# Patient Record
Sex: FEMALE | Race: WHITE | NOT HISPANIC OR LATINO | Employment: UNEMPLOYED | URBAN - METROPOLITAN AREA
[De-identification: names, ages, dates, MRNs, and addresses within clinical notes are randomized per-mention and may not be internally consistent; named-entity substitution may affect disease eponyms.]

---

## 2017-02-27 ENCOUNTER — APPOINTMENT (OUTPATIENT)
Dept: LAB | Facility: HOSPITAL | Age: 47
End: 2017-02-27
Payer: COMMERCIAL

## 2017-02-27 ENCOUNTER — TRANSCRIBE ORDERS (OUTPATIENT)
Dept: ADMINISTRATIVE | Facility: HOSPITAL | Age: 47
End: 2017-02-27

## 2017-02-27 DIAGNOSIS — Z13.29 SCREENING FOR THYROID DISORDER: ICD-10-CM

## 2017-02-27 DIAGNOSIS — Z13.228 SCREENING FOR PHENYLKETONURIA (PKU): ICD-10-CM

## 2017-02-27 DIAGNOSIS — Z13.1 SCREENING FOR DIABETES MELLITUS: Primary | ICD-10-CM

## 2017-02-27 DIAGNOSIS — Z13.1 SCREENING FOR DIABETES MELLITUS: ICD-10-CM

## 2017-02-27 LAB
25(OH)D3 SERPL-MCNC: 28 NG/ML (ref 30–100)
ALBUMIN SERPL BCP-MCNC: 4.1 G/DL (ref 3.5–5)
ALP SERPL-CCNC: 81 U/L (ref 46–116)
ALT SERPL W P-5'-P-CCNC: 16 U/L (ref 12–78)
ANION GAP SERPL CALCULATED.3IONS-SCNC: 10 MMOL/L (ref 4–13)
AST SERPL W P-5'-P-CCNC: 17 U/L (ref 5–45)
BASOPHILS # BLD AUTO: 0.01 THOUSANDS/ΜL (ref 0–0.1)
BASOPHILS NFR BLD AUTO: 0 % (ref 0–1)
BILIRUB SERPL-MCNC: 0.2 MG/DL (ref 0.2–1)
BUN SERPL-MCNC: 12 MG/DL (ref 5–25)
CALCIUM SERPL-MCNC: 8.9 MG/DL (ref 8.3–10.1)
CHLORIDE SERPL-SCNC: 106 MMOL/L (ref 100–108)
CHOLEST SERPL-MCNC: 149 MG/DL (ref 50–200)
CO2 SERPL-SCNC: 27 MMOL/L (ref 21–32)
CREAT SERPL-MCNC: 0.8 MG/DL (ref 0.6–1.3)
EOSINOPHIL # BLD AUTO: 0 THOUSAND/ΜL (ref 0–0.61)
EOSINOPHIL NFR BLD AUTO: 0 % (ref 0–6)
ERYTHROCYTE [DISTWIDTH] IN BLOOD BY AUTOMATED COUNT: 15.3 % (ref 11.6–15.1)
EST. AVERAGE GLUCOSE BLD GHB EST-MCNC: 105 MG/DL
FOLATE SERPL-MCNC: >20 NG/ML (ref 3.1–17.5)
GFR SERPL CREATININE-BSD FRML MDRD: >60 ML/MIN/1.73SQ M
GLUCOSE SERPL-MCNC: 113 MG/DL (ref 65–140)
HBA1C MFR BLD: 5.3 % (ref 4.2–6.3)
HCT VFR BLD AUTO: 41.8 % (ref 34.8–46.1)
HDLC SERPL-MCNC: 65 MG/DL (ref 40–60)
HGB BLD-MCNC: 13.7 G/DL (ref 11.5–15.4)
LDLC SERPL CALC-MCNC: 76 MG/DL (ref 0–100)
LYMPHOCYTES # BLD AUTO: 1.69 THOUSANDS/ΜL (ref 0.6–4.47)
LYMPHOCYTES NFR BLD AUTO: 22 % (ref 14–44)
MCH RBC QN AUTO: 28.3 PG (ref 26.8–34.3)
MCHC RBC AUTO-ENTMCNC: 32.8 G/DL (ref 31.4–37.4)
MCV RBC AUTO: 86 FL (ref 82–98)
MONOCYTES # BLD AUTO: 0.48 THOUSAND/ΜL (ref 0.17–1.22)
MONOCYTES NFR BLD AUTO: 6 % (ref 4–12)
NEUTROPHILS # BLD AUTO: 5.64 THOUSANDS/ΜL (ref 1.85–7.62)
NEUTS SEG NFR BLD AUTO: 72 % (ref 43–75)
PLATELET # BLD AUTO: 334 THOUSANDS/UL (ref 149–390)
PMV BLD AUTO: 10.3 FL (ref 8.9–12.7)
POTASSIUM SERPL-SCNC: 3.6 MMOL/L (ref 3.5–5.3)
PROT SERPL-MCNC: 7.1 G/DL (ref 6.4–8.2)
RBC # BLD AUTO: 4.84 MILLION/UL (ref 3.81–5.12)
SODIUM SERPL-SCNC: 143 MMOL/L (ref 136–145)
T4 FREE SERPL-MCNC: 0.88 NG/DL (ref 0.76–1.46)
TRIGL SERPL-MCNC: 41 MG/DL
TSH SERPL DL<=0.05 MIU/L-ACNC: 0.92 UIU/ML (ref 0.36–3.74)
VIT B12 SERPL-MCNC: 412 PG/ML (ref 100–900)
WBC # BLD AUTO: 7.82 THOUSAND/UL (ref 4.31–10.16)

## 2017-02-27 PROCEDURE — 80053 COMPREHEN METABOLIC PANEL: CPT

## 2017-02-27 PROCEDURE — 85025 COMPLETE CBC W/AUTO DIFF WBC: CPT

## 2017-02-27 PROCEDURE — 83036 HEMOGLOBIN GLYCOSYLATED A1C: CPT

## 2017-02-27 PROCEDURE — 82746 ASSAY OF FOLIC ACID SERUM: CPT

## 2017-02-27 PROCEDURE — 84443 ASSAY THYROID STIM HORMONE: CPT

## 2017-02-27 PROCEDURE — 36415 COLL VENOUS BLD VENIPUNCTURE: CPT

## 2017-02-27 PROCEDURE — 82306 VITAMIN D 25 HYDROXY: CPT

## 2017-02-27 PROCEDURE — 80061 LIPID PANEL: CPT

## 2017-02-27 PROCEDURE — 82607 VITAMIN B-12: CPT

## 2017-02-27 PROCEDURE — 84439 ASSAY OF FREE THYROXINE: CPT

## 2017-06-23 ENCOUNTER — ALLSCRIPTS OFFICE VISIT (OUTPATIENT)
Dept: OTHER | Facility: OTHER | Age: 47
End: 2017-06-23

## 2018-01-12 VITALS
HEART RATE: 68 BPM | WEIGHT: 189.31 LBS | DIASTOLIC BLOOD PRESSURE: 84 MMHG | HEIGHT: 60 IN | BODY MASS INDEX: 37.17 KG/M2 | SYSTOLIC BLOOD PRESSURE: 128 MMHG | TEMPERATURE: 98.9 F

## 2018-01-26 LAB — HCV AB SER-ACNC: <1

## 2018-12-26 ENCOUNTER — TRANSCRIBE ORDERS (OUTPATIENT)
Dept: ADMINISTRATIVE | Facility: HOSPITAL | Age: 48
End: 2018-12-26

## 2018-12-26 ENCOUNTER — HOSPITAL ENCOUNTER (OUTPATIENT)
Dept: NON INVASIVE DIAGNOSTICS | Facility: HOSPITAL | Age: 48
Discharge: HOME | End: 2018-12-26
Payer: COMMERCIAL

## 2018-12-26 DIAGNOSIS — M79.604 PAIN IN BOTH LOWER EXTREMITIES: ICD-10-CM

## 2018-12-26 DIAGNOSIS — R60.0 LOCALIZED EDEMA: Primary | ICD-10-CM

## 2018-12-26 DIAGNOSIS — M79.605 PAIN IN BOTH LOWER EXTREMITIES: ICD-10-CM

## 2018-12-26 DIAGNOSIS — R60.0 LOCALIZED EDEMA: ICD-10-CM

## 2018-12-26 PROCEDURE — 93970 EXTREMITY STUDY: CPT

## 2018-12-26 PROCEDURE — 93970 EXTREMITY STUDY: CPT | Performed by: SURGERY

## 2019-01-08 RX ORDER — NIFEDIPINE 60 MG/1
60 TABLET, EXTENDED RELEASE ORAL DAILY
Refills: 0 | COMMUNITY
Start: 2018-11-20 | End: 2019-12-30 | Stop reason: SDUPTHER

## 2019-01-08 RX ORDER — GABAPENTIN 300 MG/1
300 CAPSULE ORAL 3 TIMES DAILY PRN
Refills: 5 | COMMUNITY
Start: 2018-12-20 | End: 2022-05-31

## 2019-01-08 RX ORDER — FLUTICASONE PROPIONATE 50 MCG
SPRAY, SUSPENSION (ML) NASAL
COMMUNITY
Start: 2018-08-22 | End: 2019-11-07 | Stop reason: ALTCHOICE

## 2019-01-08 RX ORDER — HYDROCODONE BITARTRATE AND ACETAMINOPHEN 5; 325 MG/1; MG/1
TABLET ORAL
COMMUNITY
Start: 2018-01-20 | End: 2019-11-07 | Stop reason: ALTCHOICE

## 2019-01-08 RX ORDER — TEMAZEPAM 15 MG/1
1 CAPSULE ORAL
COMMUNITY
Start: 2017-06-23 | End: 2019-11-07 | Stop reason: ALTCHOICE

## 2019-01-08 RX ORDER — BUPRENORPHINE HYDROCHLORIDE AND NALOXONE HYDROCHLORIDE DIHYDRATE 8; 2 MG/1; MG/1
TABLET SUBLINGUAL
Refills: 0 | COMMUNITY
Start: 2018-10-24 | End: 2019-11-07 | Stop reason: ALTCHOICE

## 2019-01-08 RX ORDER — ESCITALOPRAM OXALATE 5 MG/1
5 TABLET ORAL DAILY
Refills: 0 | COMMUNITY
Start: 2018-12-21 | End: 2019-12-30 | Stop reason: SDUPTHER

## 2019-01-08 RX ORDER — FUROSEMIDE 20 MG/1
20 TABLET ORAL EVERY MORNING
Refills: 0 | COMMUNITY
Start: 2018-12-20 | End: 2019-11-07 | Stop reason: ALTCHOICE

## 2019-01-08 RX ORDER — FEXOFENADINE HCL 180 MG/1
180 TABLET ORAL DAILY
COMMUNITY
Start: 2018-08-22 | End: 2019-11-07 | Stop reason: ALTCHOICE

## 2019-01-08 RX ORDER — BUPRENORPHINE HYDROCHLORIDE, NALOXONE HYDROCHLORIDE 8; 2 MG/1; MG/1
FILM, SOLUBLE BUCCAL; SUBLINGUAL
Refills: 0 | COMMUNITY
Start: 2018-12-26

## 2019-01-08 RX ORDER — ZOLPIDEM TARTRATE 12.5 MG/1
TABLET, FILM COATED, EXTENDED RELEASE ORAL
COMMUNITY
Start: 2018-07-03 | End: 2019-12-30 | Stop reason: SDUPTHER

## 2019-01-08 RX ORDER — CEPHALEXIN 500 MG/1
500 CAPSULE ORAL 4 TIMES DAILY
Refills: 0 | COMMUNITY
Start: 2018-12-20 | End: 2019-11-07 | Stop reason: ALTCHOICE

## 2019-01-08 RX ORDER — POTASSIUM CHLORIDE 750 MG/1
10 TABLET, FILM COATED, EXTENDED RELEASE ORAL EVERY MORNING
Refills: 0 | COMMUNITY
Start: 2018-12-20 | End: 2020-06-18 | Stop reason: ALTCHOICE

## 2019-01-09 ENCOUNTER — APPOINTMENT (OUTPATIENT)
Dept: RADIOLOGY | Facility: CLINIC | Age: 49
End: 2019-01-09
Payer: COMMERCIAL

## 2019-01-09 ENCOUNTER — OFFICE VISIT (OUTPATIENT)
Dept: OBGYN CLINIC | Facility: CLINIC | Age: 49
End: 2019-01-09
Payer: COMMERCIAL

## 2019-01-09 VITALS
HEART RATE: 78 BPM | BODY MASS INDEX: 37.76 KG/M2 | WEIGHT: 200 LBS | DIASTOLIC BLOOD PRESSURE: 88 MMHG | HEIGHT: 61 IN | SYSTOLIC BLOOD PRESSURE: 123 MMHG

## 2019-01-09 DIAGNOSIS — M17.0 PRIMARY OSTEOARTHRITIS OF BOTH KNEES: ICD-10-CM

## 2019-01-09 DIAGNOSIS — M25.562 LEFT KNEE PAIN, UNSPECIFIED CHRONICITY: Primary | ICD-10-CM

## 2019-01-09 DIAGNOSIS — M25.562 LEFT KNEE PAIN, UNSPECIFIED CHRONICITY: ICD-10-CM

## 2019-01-09 DIAGNOSIS — M22.42 CHONDROMALACIA PATELLAE, LEFT: ICD-10-CM

## 2019-01-09 PROCEDURE — 99243 OFF/OP CNSLTJ NEW/EST LOW 30: CPT | Performed by: ORTHOPAEDIC SURGERY

## 2019-01-09 PROCEDURE — 73562 X-RAY EXAM OF KNEE 3: CPT

## 2019-01-09 PROCEDURE — 73564 X-RAY EXAM KNEE 4 OR MORE: CPT

## 2019-01-09 NOTE — LETTER
January 9, 2019     MD Silver Fallon  [address] [address]    Patient: King Mike   YOB: 1970   Date of Visit: 1/9/2019       Dear Dr Tran Brown: Thank you for referring Yeyo Novak to me for evaluation  Below are my notes for this consultation  If you have questions, please do not hesitate to call me  I look forward to following your patient along with you  Sincerely,        Gustavo Diehl DO        CC: No Recipients  Gustavo Diehl DO  1/9/2019 11:18 AM  Sign at close encounter  Ortho Sports Medicine Knee New Patient Visit     Assesment:     51year old female with bilateral knee medial joint moderate osteoarthritis and left knee chondromalacia patella      Plan:    Conservative treatment:    Ice to knee for 20 minutes at least 1-2 times daily  PT for ROM/strengthening to knee, hip and core  OTC NSAIDS prn for pain  Tylenol for pain  Weight loss discussed  Keep the knee straight whenever possilble  Let pain guide gradual return activities  Imaging: All imaging from today was reviewed by myself and explained to the patient  Injection:    A viscosupplementation injection was ordered and will be given at a future visit  Surgery:     No surgery is recommended at this point, continue with conservative treatment plan as noted  Follow up:    Return in about 1 week (around 1/16/2019) for Viscosupplemenation  Chief Complaint   Patient presents with    Left Knee - Pain       History of Present Illness: 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 , referred to me by their primary care physician, seen in clinic for consultation of left knee pain  Pain is located anterior, medial   The patient rates the pain as a 8/10  The pain has been present for 6 months  The patient denies an injury  The mechanism of injury was uknown   The pain is characterized as sharp, stabbing, dull, achy  The pain is present at all times  Pain is improved by rest, ice, NSAIDS and injection  The corticosteroid injection gave her about a month of relief  Pain is aggravated by stairs, squatting, weight bearing and walking  Symptoms include clicking and popping  The patient has tried rest, ice, NSAIDS and injection  Knee Surgical History:  None    Past Medical, Social and Family History:  History reviewed  No pertinent past medical history  Past Surgical History:   Procedure Laterality Date    APPENDECTOMY      BACK SURGERY      BREAST SURGERY      CHOLECYSTECTOMY      GASTRIC BYPASS       Allergies   Allergen Reactions    Shellfish Allergy Anaphylaxis     Category: Adverse Reaction;     Nsaids     Latex Hives and Rash     Category: Adverse Reaction; No current outpatient prescriptions on file prior to visit  No current facility-administered medications on file prior to visit  Social History     Social History    Marital status: /Civil Union     Spouse name: N/A    Number of children: N/A    Years of education: N/A     Occupational History    Not on file  Social History Main Topics    Smoking status: Never Smoker    Smokeless tobacco: Never Used    Alcohol use No    Drug use: No    Sexual activity: Not on file     Other Topics Concern    Not on file     Social History Narrative    No narrative on file     I have reviewed the past medical, surgical, social and family history, medications and allergies as documented in the EMR  Review of systems: ROS is negative other than that noted in the HPI  Constitutional: Negative for fatigue and fever  HENT: Negative for sore throat  Respiratory: Negative for shortness of breath  Cardiovascular: Negative for chest pain  Gastrointestinal: Negative for abdominal pain  Endocrine: Negative for cold intolerance and heat intolerance  Genitourinary: Negative for flank pain  Musculoskeletal: Negative for back pain  Skin: Negative for rash  Allergic/Immunologic: Negative for immunocompromised state  Neurological: Negative for dizziness  Psychiatric/Behavioral: Negative for agitation  Physical Exam:    Blood pressure 123/88, pulse 78, height 5' 1" (1 549 m), weight 90 7 kg (200 lb)  General/Constitutional: NAD, well developed, well nourished  HENT: Normocephalic, atraumatic  CV: Intact distal pulses, regular rate  Resp: No respiratory distress or labored breathing  Lymphatic: No lymphadenopathy palpated  Neuro: Alert and Oriented x 3, no focal deficits  Psych: Normal mood, normal affect, normal judgement, normal behavior  Skin: Warm, dry, no rashes, no erythema      Knee Exam (focused):                RIGHT LEFT   ROM:   0-130 0-130   Palpation: Effusion negative negative     MJL tenderness Negative Positive     LJL tenderness Negative Negative   Instability: Varus stable stable     Valgus stable stable   Special Tests: Lachman Negative Negative     Posterior drawer Negative Negative     Anterior drawer Negative Negative     Pivot shift not tested not tested     Dial not tested not tested   Patella: Palpation no tenderness medial facet ttp and lateral facet ttp     Mobility 1/4 1/4     Apprehension Negative Negative   Other: Single leg 1/4 squat not tested not tested      LE NV Exam: +2 DP/PT pulses bilaterally  Sensation intact to light touch L2-S1 bilaterally     Bilateral hip ROM demonstrates no pain actively or passively    No calf tenderness to palpation bilaterally    Knee Imaging    X-rays of the bilateral knee were reviewed, which demonstrates moderate medial compartment osteoarthritis bilaterally  I have reviewed the radiology report and do not currently have a radiology reading from [facility], but will check the result once the reading is performed        Scribe Attestation    I,:   Jeffery Breaux am acting as a scribe while in the presence of the attending physician :        I,:   Freddy Lerma DO personally performed the services described in this documentation    as scribed in my presence :

## 2019-01-09 NOTE — PROGRESS NOTES
Ortho Sports Medicine Knee New Patient Visit     Assesment:     51year old female with bilateral knee medial joint moderate osteoarthritis and left knee chondromalacia patella      Plan:    Conservative treatment:    Ice to knee for 20 minutes at least 1-2 times daily  PT for ROM/strengthening to knee, hip and core  OTC NSAIDS prn for pain  Tylenol for pain  Weight loss discussed  Keep the knee straight whenever possilble  Let pain guide gradual return activities  Imaging: All imaging from today was reviewed by myself and explained to the patient  Injection:    A viscosupplementation injection was ordered and will be given at a future visit  Surgery:     No surgery is recommended at this point, continue with conservative treatment plan as noted  Follow up:    Return in about 1 week (around 1/16/2019) for Viscosupplemenation  Chief Complaint   Patient presents with    Left Knee - Pain       History of Present Illness: 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 , referred to me by their primary care physician, seen in clinic for consultation of left knee pain  Pain is located anterior, medial   The patient rates the pain as a 8/10  The pain has been present for 6 months  The patient denies an injury  The mechanism of injury was uknown  The pain is characterized as sharp, stabbing, dull, achy  The pain is present at all times  Pain is improved by rest, ice, NSAIDS and injection  The corticosteroid injection gave her about a month of relief  Pain is aggravated by stairs, squatting, weight bearing and walking  Symptoms include clicking and popping  The patient has tried rest, ice, NSAIDS and injection  Knee Surgical History:  None    Past Medical, Social and Family History:  History reviewed  No pertinent past medical history    Past Surgical History:   Procedure Laterality Date    APPENDECTOMY      BACK SURGERY      BREAST SURGERY      CHOLECYSTECTOMY      GASTRIC BYPASS       Allergies   Allergen Reactions    Shellfish Allergy Anaphylaxis     Category: Adverse Reaction;     Nsaids     Latex Hives and Rash     Category: Adverse Reaction; No current outpatient prescriptions on file prior to visit  No current facility-administered medications on file prior to visit  Social History     Social History    Marital status: /Civil Union     Spouse name: N/A    Number of children: N/A    Years of education: N/A     Occupational History    Not on file  Social History Main Topics    Smoking status: Never Smoker    Smokeless tobacco: Never Used    Alcohol use No    Drug use: No    Sexual activity: Not on file     Other Topics Concern    Not on file     Social History Narrative    No narrative on file     I have reviewed the past medical, surgical, social and family history, medications and allergies as documented in the EMR  Review of systems: ROS is negative other than that noted in the HPI  Constitutional: Negative for fatigue and fever  HENT: Negative for sore throat  Respiratory: Negative for shortness of breath  Cardiovascular: Negative for chest pain  Gastrointestinal: Negative for abdominal pain  Endocrine: Negative for cold intolerance and heat intolerance  Genitourinary: Negative for flank pain  Musculoskeletal: Negative for back pain  Skin: Negative for rash  Allergic/Immunologic: Negative for immunocompromised state  Neurological: Negative for dizziness  Psychiatric/Behavioral: Negative for agitation  Physical Exam:    Blood pressure 123/88, pulse 78, height 5' 1" (1 549 m), weight 90 7 kg (200 lb)      General/Constitutional: NAD, well developed, well nourished  HENT: Normocephalic, atraumatic  CV: Intact distal pulses, regular rate  Resp: No respiratory distress or labored breathing  Lymphatic: No lymphadenopathy palpated  Neuro: Alert and Oriented x 3, no focal deficits  Psych: Normal mood, normal affect, normal judgement, normal behavior  Skin: Warm, dry, no rashes, no erythema      Knee Exam (focused):                RIGHT LEFT   ROM:   0-130 0-130   Palpation: Effusion negative negative     MJL tenderness Negative Positive     LJL tenderness Negative Negative   Instability: Varus stable stable     Valgus stable stable   Special Tests: Lachman Negative Negative     Posterior drawer Negative Negative     Anterior drawer Negative Negative     Pivot shift not tested not tested     Dial not tested not tested   Patella: Palpation no tenderness medial facet ttp and lateral facet ttp     Mobility 1/4 1/4     Apprehension Negative Negative   Other: Single leg 1/4 squat not tested not tested      LE NV Exam: +2 DP/PT pulses bilaterally  Sensation intact to light touch L2-S1 bilaterally     Bilateral hip ROM demonstrates no pain actively or passively    No calf tenderness to palpation bilaterally    Knee Imaging    X-rays of the bilateral knee were reviewed, which demonstrates moderate medial compartment osteoarthritis bilaterally  I have reviewed the radiology report and do not currently have a radiology reading from [facility], but will check the result once the reading is performed        Scribe Attestation    I,:   Leslie Real am acting as a scribe while in the presence of the attending physician :        I,:   Edie Aleman, DO personally performed the services described in this documentation    as scribed in my presence :

## 2019-01-24 ENCOUNTER — TELEPHONE (OUTPATIENT)
Dept: OBGYN CLINIC | Facility: HOSPITAL | Age: 49
End: 2019-01-24

## 2019-01-24 NOTE — TELEPHONE ENCOUNTER
Dr Kenna Pope    Patient called because she got a call from [facility] about the injections  Ana would like a call to verify how many injections patient is to receive  They called stating they were sending out 2 injections for bilat knee pain  But patient stated it is just for her L knee      Please call Ana to verify  Thank you

## 2019-02-13 ENCOUNTER — TELEPHONE (OUTPATIENT)
Dept: OBGYN CLINIC | Facility: OTHER | Age: 49
End: 2019-02-13

## 2019-02-13 NOTE — TELEPHONE ENCOUNTER
TO BE COMPLETED BY CENTRAL AUTH TEAM:     Physician: DR Duke Cramer    Medication: Guilherme Villarreal    Number of Injections in Series 1    (Appointments scheduled 1 week apart from one another):     Schedule after this date: 2/16/19    Billing Info: Buy and Bill/Specialty Pharmacy-Patient Supply  [facility]    Appointment Message Line:  LEFT KNEE Paris Rachelle ([facility])    (please copy and paste appointment message line when scheduling appointment)    Additional Comments: PATIENT STATED SHE WILL CALL BACK TO SCHEDULE APPT,

## 2019-11-07 ENCOUNTER — OFFICE VISIT (OUTPATIENT)
Dept: FAMILY MEDICINE CLINIC | Facility: HOSPITAL | Age: 49
End: 2019-11-07
Payer: COMMERCIAL

## 2019-11-07 VITALS
HEART RATE: 99 BPM | TEMPERATURE: 98.7 F | OXYGEN SATURATION: 96 % | BODY MASS INDEX: 33.57 KG/M2 | DIASTOLIC BLOOD PRESSURE: 84 MMHG | SYSTOLIC BLOOD PRESSURE: 128 MMHG | WEIGHT: 177.8 LBS | HEIGHT: 61 IN

## 2019-11-07 DIAGNOSIS — J32.9 SINUSITIS, UNSPECIFIED CHRONICITY, UNSPECIFIED LOCATION: Primary | ICD-10-CM

## 2019-11-07 DIAGNOSIS — J98.01 BRONCHOSPASM: ICD-10-CM

## 2019-11-07 PROCEDURE — 99213 OFFICE O/P EST LOW 20 MIN: CPT | Performed by: PHYSICIAN ASSISTANT

## 2019-11-07 RX ORDER — DEXTROAMPHETAMINE SACCHARATE, AMPHETAMINE ASPARTATE, DEXTROAMPHETAMINE SULFATE AND AMPHETAMINE SULFATE 5; 5; 5; 5 MG/1; MG/1; MG/1; MG/1
1 TABLET ORAL 2 TIMES DAILY PRN
Refills: 0 | COMMUNITY
Start: 2019-10-22 | End: 2021-08-06 | Stop reason: SDUPTHER

## 2019-11-07 RX ORDER — PREDNISONE 10 MG/1
TABLET ORAL
Qty: 10 TABLET | Refills: 0 | Status: SHIPPED | OUTPATIENT
Start: 2019-11-07 | End: 2020-06-18 | Stop reason: ALTCHOICE

## 2019-11-07 RX ORDER — AMOXICILLIN AND CLAVULANATE POTASSIUM 875; 125 MG/1; MG/1
1 TABLET, FILM COATED ORAL EVERY 12 HOURS SCHEDULED
Qty: 20 TABLET | Refills: 0 | Status: SHIPPED | OUTPATIENT
Start: 2019-11-07 | End: 2019-11-17

## 2019-11-07 NOTE — PROGRESS NOTES
Assessment/Plan:         Diagnoses and all orders for this visit:    Sinusitis, unspecified chronicity, unspecified location  -     amoxicillin-clavulanate (AUGMENTIN) 875-125 mg per tablet; Take 1 tablet by mouth every 12 (twelve) hours for 10 days  -     predniSONE 10 mg tablet; Take 2 for 2 days, then 1 daily for 2 days, then every other day  Bronchospasm  -     predniSONE 10 mg tablet; Take 2 for 2 days, then 1 daily for 2 days, then every other day  Other orders  -     Cholecalciferol 50 MCG (2000 UT) CAPS; Take by mouth  -     amphetamine-dextroamphetamine (ADDERALL) 20 mg tablet; Take 1 tablet by mouth 2 (two) times a day as needed        Subjective:      Patient ID: Alexandru Esposito is a 47 y o  female  47year old white female presents with c/o congestion since past Sunday, started with burning throat  Ears hurt, throat hurts  Last week, children at home were sick and so was , had ear pain, and sore throat  Working on [employer], will finish on December 8th  Review of Systems   Constitutional: Positive for chills, diaphoresis, fatigue and fever  HENT: Positive for congestion, ear pain, postnasal drip, rhinorrhea and sore throat  Respiratory: Positive for cough and chest tightness  Negative for shortness of breath  Objective:      /84   Pulse 99   Temp 98 7 °F (37 1 °C) (Tympanic)   Ht 5' 1" (1 549 m)   Wt 80 6 kg (177 lb 12 8 oz)   LMP  (LMP Unknown)   SpO2 96%   BMI 33 60 kg/m²          Physical Exam   Constitutional: She is oriented to person, place, and time  She appears well-developed and well-nourished  Non-toxic appearance  She does not appear ill  No distress  HENT:   Right Ear: Hearing and ear canal normal  There is drainage  No swelling or tenderness  A middle ear effusion is present  Left Ear: Hearing and ear canal normal  There is drainage  No swelling or tenderness  A middle ear effusion is present     Mouth/Throat: Uvula is midline, oropharynx is clear and moist and mucous membranes are normal  Mucous membranes are not pale, not dry and not cyanotic  No oral lesions  No uvula swelling  No oropharyngeal exudate, posterior oropharyngeal edema, posterior oropharyngeal erythema or tonsillar abscesses  Eyes: EOM are normal    Neck: Normal range of motion  Neck supple  Pulmonary/Chest: Effort normal  No stridor  No respiratory distress  She has no wheezes  She has no rhonchi  She has no rales  Lung sounds decreased  Neurological: She is alert and oriented to person, place, and time  Psychiatric: She has a normal mood and affect  Her behavior is normal    Nursing note and vitals reviewed

## 2019-11-07 NOTE — PATIENT INSTRUCTIONS
Recommend Augmentin 875 mg  Bid for 1 week, and short course of Prednisone  Increase water intake, and consider saline nasal flushes

## 2019-12-30 DIAGNOSIS — F32.A DEPRESSION, UNSPECIFIED DEPRESSION TYPE: ICD-10-CM

## 2019-12-30 DIAGNOSIS — I15.9 SECONDARY HYPERTENSION: Primary | ICD-10-CM

## 2019-12-30 DIAGNOSIS — G47.00 INSOMNIA, UNSPECIFIED TYPE: ICD-10-CM

## 2019-12-30 RX ORDER — ESCITALOPRAM OXALATE 5 MG/1
5 TABLET ORAL DAILY
Qty: 30 TABLET | Refills: 3 | Status: SHIPPED | OUTPATIENT
Start: 2019-12-30 | End: 2020-02-13 | Stop reason: SDUPTHER

## 2019-12-30 RX ORDER — NIFEDIPINE 60 MG/1
60 TABLET, EXTENDED RELEASE ORAL DAILY
Qty: 30 TABLET | Refills: 3 | Status: SHIPPED | OUTPATIENT
Start: 2019-12-30 | End: 2020-01-22 | Stop reason: SDUPTHER

## 2019-12-30 RX ORDER — ZOLPIDEM TARTRATE 12.5 MG/1
12.5 TABLET, FILM COATED, EXTENDED RELEASE ORAL
Qty: 30 TABLET | Refills: 0 | Status: SHIPPED | OUTPATIENT
Start: 2019-12-30 | End: 2020-01-22 | Stop reason: SDUPTHER

## 2020-01-22 DIAGNOSIS — I15.9 SECONDARY HYPERTENSION: ICD-10-CM

## 2020-01-22 DIAGNOSIS — G47.00 INSOMNIA, UNSPECIFIED TYPE: ICD-10-CM

## 2020-01-22 RX ORDER — NIFEDIPINE 60 MG/1
60 TABLET, EXTENDED RELEASE ORAL DAILY
Qty: 30 TABLET | Refills: 3 | Status: SHIPPED | OUTPATIENT
Start: 2020-01-22 | End: 2020-02-13 | Stop reason: SDUPTHER

## 2020-01-22 RX ORDER — ZOLPIDEM TARTRATE 12.5 MG/1
12.5 TABLET, FILM COATED, EXTENDED RELEASE ORAL
Qty: 30 TABLET | Refills: 0 | Status: SHIPPED | OUTPATIENT
Start: 2020-01-22 | End: 2020-02-13 | Stop reason: SDUPTHER

## 2020-02-13 DIAGNOSIS — I15.9 SECONDARY HYPERTENSION: ICD-10-CM

## 2020-02-13 DIAGNOSIS — F32.A DEPRESSION, UNSPECIFIED DEPRESSION TYPE: ICD-10-CM

## 2020-02-13 DIAGNOSIS — G47.00 INSOMNIA, UNSPECIFIED TYPE: ICD-10-CM

## 2020-02-14 RX ORDER — ESCITALOPRAM OXALATE 5 MG/1
5 TABLET ORAL DAILY
Qty: 30 TABLET | Refills: 0 | OUTPATIENT
Start: 2020-02-14

## 2020-02-14 RX ORDER — NIFEDIPINE 60 MG/1
60 TABLET, EXTENDED RELEASE ORAL DAILY
Qty: 30 TABLET | Refills: 0 | Status: SHIPPED | OUTPATIENT
Start: 2020-02-14 | End: 2020-04-01

## 2020-02-14 RX ORDER — NIFEDIPINE 60 MG/1
60 TABLET, EXTENDED RELEASE ORAL DAILY
Qty: 30 TABLET | Refills: 0 | OUTPATIENT
Start: 2020-02-14

## 2020-02-14 RX ORDER — ZOLPIDEM TARTRATE 12.5 MG/1
12.5 TABLET, FILM COATED, EXTENDED RELEASE ORAL
Qty: 30 TABLET | Refills: 0 | OUTPATIENT
Start: 2020-02-14

## 2020-02-14 RX ORDER — ESCITALOPRAM OXALATE 5 MG/1
5 TABLET ORAL DAILY
Qty: 30 TABLET | Refills: 0 | Status: SHIPPED | OUTPATIENT
Start: 2020-02-14 | End: 2020-03-31

## 2020-02-14 RX ORDER — ZOLPIDEM TARTRATE 12.5 MG/1
12.5 TABLET, FILM COATED, EXTENDED RELEASE ORAL
Qty: 30 TABLET | Refills: 0 | Status: SHIPPED | OUTPATIENT
Start: 2020-02-14 | End: 2020-03-25 | Stop reason: SDUPTHER

## 2020-03-25 DIAGNOSIS — G47.00 INSOMNIA, UNSPECIFIED TYPE: ICD-10-CM

## 2020-03-26 RX ORDER — ZOLPIDEM TARTRATE 12.5 MG/1
12.5 TABLET, FILM COATED, EXTENDED RELEASE ORAL
Qty: 30 TABLET | Refills: 0 | Status: SHIPPED | OUTPATIENT
Start: 2020-03-26 | End: 2020-04-24 | Stop reason: SDUPTHER

## 2020-03-28 DIAGNOSIS — F32.A DEPRESSION, UNSPECIFIED DEPRESSION TYPE: ICD-10-CM

## 2020-03-30 ENCOUNTER — HOSPITAL ENCOUNTER (OUTPATIENT)
Dept: RADIOLOGY | Facility: HOSPITAL | Age: 50
Discharge: HOME | End: 2020-03-30
Attending: INTERNAL MEDICINE
Payer: COMMERCIAL

## 2020-03-30 ENCOUNTER — TELEPHONE (OUTPATIENT)
Dept: FAMILY MEDICINE CLINIC | Facility: HOSPITAL | Age: 50
End: 2020-03-30

## 2020-03-30 DIAGNOSIS — T14.90XA TRAUMA: Primary | ICD-10-CM

## 2020-03-30 DIAGNOSIS — T14.90XA TRAUMA: ICD-10-CM

## 2020-03-30 PROCEDURE — 73660 X-RAY EXAM OF TOE(S): CPT

## 2020-03-30 NOTE — TELEPHONE ENCOUNTER
OK to order xray of foot  Please tell patient she must wear a mask when at radiology this is at their request   We can give her one when she arrives if needed, at our door, not inside

## 2020-03-31 RX ORDER — ESCITALOPRAM OXALATE 5 MG/1
TABLET ORAL
Qty: 30 TABLET | Refills: 0 | Status: SHIPPED | OUTPATIENT
Start: 2020-03-31 | End: 2020-05-08

## 2020-04-01 DIAGNOSIS — I15.9 SECONDARY HYPERTENSION: ICD-10-CM

## 2020-04-01 RX ORDER — NIFEDIPINE 60 MG/1
TABLET, EXTENDED RELEASE ORAL
Qty: 30 TABLET | Refills: 0 | Status: SHIPPED | OUTPATIENT
Start: 2020-04-01 | End: 2020-05-18

## 2020-04-24 DIAGNOSIS — G47.00 INSOMNIA, UNSPECIFIED TYPE: ICD-10-CM

## 2020-04-24 RX ORDER — ZOLPIDEM TARTRATE 12.5 MG/1
12.5 TABLET, FILM COATED, EXTENDED RELEASE ORAL
Qty: 30 TABLET | Refills: 0 | Status: SHIPPED | OUTPATIENT
Start: 2020-04-24 | End: 2020-05-20 | Stop reason: SDUPTHER

## 2020-05-08 DIAGNOSIS — F32.A DEPRESSION, UNSPECIFIED DEPRESSION TYPE: ICD-10-CM

## 2020-05-08 RX ORDER — ESCITALOPRAM OXALATE 5 MG/1
TABLET ORAL
Qty: 30 TABLET | Refills: 0 | Status: SHIPPED | OUTPATIENT
Start: 2020-05-08 | End: 2020-06-18 | Stop reason: SDUPTHER

## 2020-05-17 DIAGNOSIS — I15.9 SECONDARY HYPERTENSION: ICD-10-CM

## 2020-05-18 RX ORDER — NIFEDIPINE 60 MG/1
TABLET, EXTENDED RELEASE ORAL
Qty: 30 TABLET | Refills: 0 | Status: SHIPPED | OUTPATIENT
Start: 2020-05-18 | End: 2020-07-27

## 2020-05-20 DIAGNOSIS — G47.00 INSOMNIA, UNSPECIFIED TYPE: ICD-10-CM

## 2020-05-20 RX ORDER — ZOLPIDEM TARTRATE 12.5 MG/1
12.5 TABLET, FILM COATED, EXTENDED RELEASE ORAL
Qty: 30 TABLET | Refills: 0 | Status: SHIPPED | OUTPATIENT
Start: 2020-05-20 | End: 2020-06-18 | Stop reason: ALTCHOICE

## 2020-06-13 DIAGNOSIS — F32.A DEPRESSION, UNSPECIFIED DEPRESSION TYPE: ICD-10-CM

## 2020-06-13 DIAGNOSIS — G47.00 INSOMNIA, UNSPECIFIED TYPE: ICD-10-CM

## 2020-06-13 DIAGNOSIS — I15.9 SECONDARY HYPERTENSION: ICD-10-CM

## 2020-06-15 RX ORDER — ESCITALOPRAM OXALATE 5 MG/1
5 TABLET ORAL DAILY
Qty: 30 TABLET | Refills: 5 | OUTPATIENT
Start: 2020-06-15

## 2020-06-15 RX ORDER — NIFEDIPINE 60 MG/1
60 TABLET, EXTENDED RELEASE ORAL DAILY
Qty: 30 TABLET | Refills: 5 | OUTPATIENT
Start: 2020-06-15

## 2020-06-15 RX ORDER — ZOLPIDEM TARTRATE 12.5 MG/1
12.5 TABLET, FILM COATED, EXTENDED RELEASE ORAL
Qty: 30 TABLET | Refills: 0 | OUTPATIENT
Start: 2020-06-15

## 2020-06-18 ENCOUNTER — OFFICE VISIT (OUTPATIENT)
Dept: FAMILY MEDICINE CLINIC | Facility: HOSPITAL | Age: 50
End: 2020-06-18
Payer: COMMERCIAL

## 2020-06-18 ENCOUNTER — TELEPHONE (OUTPATIENT)
Dept: ADMINISTRATIVE | Facility: OTHER | Age: 50
End: 2020-06-18

## 2020-06-18 VITALS
HEIGHT: 61 IN | WEIGHT: 175 LBS | DIASTOLIC BLOOD PRESSURE: 78 MMHG | TEMPERATURE: 97.5 F | BODY MASS INDEX: 33.04 KG/M2 | RESPIRATION RATE: 16 BRPM | SYSTOLIC BLOOD PRESSURE: 136 MMHG | HEART RATE: 64 BPM

## 2020-06-18 DIAGNOSIS — F41.9 ANXIETY: ICD-10-CM

## 2020-06-18 DIAGNOSIS — G47.00 INSOMNIA, UNSPECIFIED TYPE: ICD-10-CM

## 2020-06-18 DIAGNOSIS — F32.A DEPRESSION, UNSPECIFIED DEPRESSION TYPE: ICD-10-CM

## 2020-06-18 DIAGNOSIS — I15.9 SECONDARY HYPERTENSION: ICD-10-CM

## 2020-06-18 DIAGNOSIS — I10 BENIGN ESSENTIAL HTN: Primary | ICD-10-CM

## 2020-06-18 PROCEDURE — 3078F DIAST BP <80 MM HG: CPT | Performed by: PHYSICIAN ASSISTANT

## 2020-06-18 PROCEDURE — 3008F BODY MASS INDEX DOCD: CPT | Performed by: PHYSICIAN ASSISTANT

## 2020-06-18 PROCEDURE — 1036F TOBACCO NON-USER: CPT | Performed by: PHYSICIAN ASSISTANT

## 2020-06-18 PROCEDURE — 3075F SYST BP GE 130 - 139MM HG: CPT | Performed by: PHYSICIAN ASSISTANT

## 2020-06-18 PROCEDURE — 99214 OFFICE O/P EST MOD 30 MIN: CPT | Performed by: PHYSICIAN ASSISTANT

## 2020-06-18 RX ORDER — CLONAZEPAM 0.5 MG/1
0.5 TABLET ORAL 2 TIMES DAILY PRN
Qty: 30 TABLET | Refills: 2 | Status: SHIPPED | OUTPATIENT
Start: 2020-06-18 | End: 2020-12-31 | Stop reason: SDUPTHER

## 2020-06-18 RX ORDER — TRIAMTERENE AND HYDROCHLOROTHIAZIDE 37.5; 25 MG/1; MG/1
1 TABLET ORAL DAILY
Qty: 30 TABLET | Refills: 3 | Status: SHIPPED | OUTPATIENT
Start: 2020-06-18 | End: 2020-10-15 | Stop reason: SDUPTHER

## 2020-06-18 RX ORDER — ESCITALOPRAM OXALATE 10 MG/1
10 TABLET ORAL DAILY
Qty: 30 TABLET | Refills: 2 | Status: SHIPPED | OUTPATIENT
Start: 2020-06-18 | End: 2020-09-14

## 2020-06-18 RX ORDER — ESZOPICLONE 3 MG/1
3 TABLET, FILM COATED ORAL
Qty: 30 TABLET | Refills: 2 | Status: SHIPPED | OUTPATIENT
Start: 2020-06-18 | End: 2020-12-31 | Stop reason: SDUPTHER

## 2020-06-18 NOTE — TELEPHONE ENCOUNTER
----- Message from Rufus Mo sent at 6/18/2020  9:58 AM EDT -----  Regarding: last pap and mammo  Contact: [phone]  06/18/20 9:59 AM    Hello, our patient Asif Rollins has had pap/mammo completed/performed  Please assist in updating the patient chart by contacting [address] [place] [address] location  Phone [phone]  The date of service is within the past 2 yrs      Thank you,  Dorothy Rodriguez

## 2020-06-18 NOTE — TELEPHONE ENCOUNTER
Upon review of the In Basket request and the patient's chart, initial outreach has been made via fax, please see Contacts section for details  A second outreach attempt will be made within 5 business days      Thank you  Gabrielle Gross

## 2020-06-18 NOTE — LETTER
Procedure Request Form: Cervical Cancer Screening & Mammogram      Date Requested: 20  Patient: Lawence Lacks  Patient : 1970   Referring Provider: Jil Dudley PA-C        Date of Procedure ______________________________       The above patient has informed us that they have completed their   most recent Cervical Cancer Screening & Mammogram at your facility  Please complete   this form and attach all corresponding procedure reports/results  Comments __________________________________________________________  ____________________________________________________________________  ____________________________________________________________________  ____________________________________________________________________    Facility Completing Procedure _________________________________________    Form Completed By (print name) _______________________________________      Signature __________________________________________________________      These reports are needed for  compliance    Please fax this completed form and a copy of the procedure report to our office located at [place] [address] as soon as possible to [phone] benjamin Linares: Phone [phone]    We thank you for your assistance in treating our mutual patient

## 2020-06-24 NOTE — TELEPHONE ENCOUNTER
Upon review of the In Basket request we were able to locate, review, and update the patient chart as requested for Pap Smear (HPV) aka Cervical Cancer Screening  Any additional questions or concerns should be emailed to the Practice Liaisons via [email or web address] email, please do not reply via In Basket      Thank you  Codey De La Rosa

## 2020-07-05 DIAGNOSIS — F32.A DEPRESSION, UNSPECIFIED DEPRESSION TYPE: ICD-10-CM

## 2020-07-06 RX ORDER — ESCITALOPRAM OXALATE 5 MG/1
TABLET ORAL
Qty: 30 TABLET | Refills: 0 | Status: SHIPPED | OUTPATIENT
Start: 2020-07-06 | End: 2020-10-16 | Stop reason: ALTCHOICE

## 2020-07-21 ENCOUNTER — TELEPHONE (OUTPATIENT)
Dept: FAMILY MEDICINE CLINIC | Facility: HOSPITAL | Age: 50
End: 2020-07-21

## 2020-07-25 DIAGNOSIS — I15.9 SECONDARY HYPERTENSION: ICD-10-CM

## 2020-07-27 RX ORDER — NIFEDIPINE 60 MG/1
TABLET, EXTENDED RELEASE ORAL
Qty: 30 TABLET | Refills: 0 | Status: SHIPPED | OUTPATIENT
Start: 2020-07-27 | End: 2020-08-21

## 2020-08-20 DIAGNOSIS — I15.9 SECONDARY HYPERTENSION: ICD-10-CM

## 2020-08-21 RX ORDER — NIFEDIPINE 60 MG/1
TABLET, EXTENDED RELEASE ORAL
Qty: 30 TABLET | Refills: 0 | Status: SHIPPED | OUTPATIENT
Start: 2020-08-21 | End: 2020-09-17

## 2020-09-14 DIAGNOSIS — F32.A DEPRESSION, UNSPECIFIED DEPRESSION TYPE: ICD-10-CM

## 2020-09-14 RX ORDER — ESCITALOPRAM OXALATE 10 MG/1
TABLET ORAL
Qty: 30 TABLET | Refills: 2 | Status: SHIPPED | OUTPATIENT
Start: 2020-09-14 | End: 2020-10-16 | Stop reason: ALTCHOICE

## 2020-09-17 DIAGNOSIS — I15.9 SECONDARY HYPERTENSION: ICD-10-CM

## 2020-09-17 RX ORDER — NIFEDIPINE 60 MG/1
TABLET, EXTENDED RELEASE ORAL
Qty: 30 TABLET | Refills: 0 | Status: SHIPPED | OUTPATIENT
Start: 2020-09-17 | End: 2020-10-15 | Stop reason: SDUPTHER

## 2020-10-15 DIAGNOSIS — G47.00 INSOMNIA, UNSPECIFIED TYPE: ICD-10-CM

## 2020-10-15 DIAGNOSIS — I15.9 SECONDARY HYPERTENSION: ICD-10-CM

## 2020-10-16 DIAGNOSIS — F33.9 RECURRENT MAJOR DEPRESSIVE DISORDER, REMISSION STATUS UNSPECIFIED (HCC): ICD-10-CM

## 2020-10-16 DIAGNOSIS — F33.9 RECURRENT MAJOR DEPRESSIVE DISORDER, REMISSION STATUS UNSPECIFIED (HCC): Primary | ICD-10-CM

## 2020-10-16 RX ORDER — TRIAMTERENE AND HYDROCHLOROTHIAZIDE 37.5; 25 MG/1; MG/1
1 TABLET ORAL DAILY
Qty: 30 TABLET | Refills: 0 | Status: SHIPPED | OUTPATIENT
Start: 2020-10-16 | End: 2020-11-12

## 2020-10-16 RX ORDER — NIFEDIPINE 60 MG/1
60 TABLET, EXTENDED RELEASE ORAL DAILY
Qty: 30 TABLET | Refills: 0 | Status: SHIPPED | OUTPATIENT
Start: 2020-10-16 | End: 2020-11-12 | Stop reason: SDUPTHER

## 2020-10-16 RX ORDER — FLUOXETINE HYDROCHLORIDE 40 MG/1
CAPSULE ORAL
Qty: 1 CAPSULE | Refills: 0 | OUTPATIENT
Start: 2020-10-16

## 2020-10-16 RX ORDER — ZOLPIDEM TARTRATE 12.5 MG/1
12.5 TABLET, FILM COATED, EXTENDED RELEASE ORAL
Qty: 30 TABLET | Refills: 0 | Status: SHIPPED | OUTPATIENT
Start: 2020-10-16 | End: 2020-11-12 | Stop reason: SDUPTHER

## 2020-10-16 RX ORDER — FLUOXETINE HYDROCHLORIDE 40 MG/1
40 CAPSULE ORAL DAILY
Qty: 30 CAPSULE | Refills: 3 | Status: SHIPPED | OUTPATIENT
Start: 2020-10-16 | End: 2020-11-12 | Stop reason: SDUPTHER

## 2020-11-12 DIAGNOSIS — F33.9 RECURRENT MAJOR DEPRESSIVE DISORDER, REMISSION STATUS UNSPECIFIED (HCC): ICD-10-CM

## 2020-11-12 DIAGNOSIS — G47.00 INSOMNIA, UNSPECIFIED TYPE: ICD-10-CM

## 2020-11-12 DIAGNOSIS — I15.9 SECONDARY HYPERTENSION: ICD-10-CM

## 2020-11-12 RX ORDER — TRIAMTERENE AND HYDROCHLOROTHIAZIDE 37.5; 25 MG/1; MG/1
TABLET ORAL
Qty: 30 TABLET | Refills: 0 | Status: SHIPPED | OUTPATIENT
Start: 2020-11-12 | End: 2020-11-12 | Stop reason: SDUPTHER

## 2020-11-13 RX ORDER — TRIAMTERENE AND HYDROCHLOROTHIAZIDE 37.5; 25 MG/1; MG/1
1 TABLET ORAL DAILY
Qty: 30 TABLET | Refills: 5 | Status: SHIPPED | OUTPATIENT
Start: 2020-11-13 | End: 2020-12-07 | Stop reason: SDUPTHER

## 2020-11-13 RX ORDER — NIFEDIPINE 60 MG/1
60 TABLET, EXTENDED RELEASE ORAL DAILY
Qty: 30 TABLET | Refills: 5 | Status: SHIPPED | OUTPATIENT
Start: 2020-11-13 | End: 2020-12-07 | Stop reason: SDUPTHER

## 2020-11-13 RX ORDER — FLUOXETINE HYDROCHLORIDE 40 MG/1
40 CAPSULE ORAL DAILY
Qty: 30 CAPSULE | Refills: 0 | Status: SHIPPED | OUTPATIENT
Start: 2020-11-13 | End: 2020-12-07 | Stop reason: SDUPTHER

## 2020-11-13 RX ORDER — ZOLPIDEM TARTRATE 12.5 MG/1
12.5 TABLET, FILM COATED, EXTENDED RELEASE ORAL
Qty: 30 TABLET | Refills: 0 | Status: SHIPPED | OUTPATIENT
Start: 2020-11-13 | End: 2020-12-07 | Stop reason: SDUPTHER

## 2020-12-07 ENCOUNTER — HOSPITAL ENCOUNTER (EMERGENCY)
Facility: HOSPITAL | Age: 50
Discharge: HOME | End: 2020-12-07
Attending: EMERGENCY MEDICINE
Payer: COMMERCIAL

## 2020-12-07 VITALS
RESPIRATION RATE: 18 BRPM | HEIGHT: 61 IN | SYSTOLIC BLOOD PRESSURE: 130 MMHG | HEART RATE: 91 BPM | BODY MASS INDEX: 32.1 KG/M2 | DIASTOLIC BLOOD PRESSURE: 77 MMHG | WEIGHT: 170 LBS | OXYGEN SATURATION: 96 % | TEMPERATURE: 97.9 F

## 2020-12-07 DIAGNOSIS — F33.9 RECURRENT MAJOR DEPRESSIVE DISORDER, REMISSION STATUS UNSPECIFIED (HCC): ICD-10-CM

## 2020-12-07 DIAGNOSIS — Z20.822 SUSPECTED COVID-19 VIRUS INFECTION: Primary | ICD-10-CM

## 2020-12-07 DIAGNOSIS — G47.00 INSOMNIA, UNSPECIFIED TYPE: ICD-10-CM

## 2020-12-07 DIAGNOSIS — I15.9 SECONDARY HYPERTENSION: ICD-10-CM

## 2020-12-07 PROCEDURE — 87637 SARSCOV2&INF A&B&RSV AMP PRB: CPT | Performed by: PHYSICIAN ASSISTANT

## 2020-12-07 PROCEDURE — 99282 EMERGENCY DEPT VISIT SF MDM: CPT

## 2020-12-07 PROCEDURE — 99282 EMERGENCY DEPT VISIT SF MDM: CPT | Performed by: PHYSICIAN ASSISTANT

## 2020-12-07 RX ORDER — TRIAMTERENE AND HYDROCHLOROTHIAZIDE 37.5; 25 MG/1; MG/1
1 TABLET ORAL DAILY
Qty: 30 TABLET | Refills: 0 | Status: SHIPPED | OUTPATIENT
Start: 2020-12-07 | End: 2020-12-31 | Stop reason: SDUPTHER

## 2020-12-07 RX ORDER — ZOLPIDEM TARTRATE 12.5 MG/1
12.5 TABLET, FILM COATED, EXTENDED RELEASE ORAL
Qty: 30 TABLET | Refills: 0 | Status: SHIPPED | OUTPATIENT
Start: 2020-12-07 | End: 2020-12-31 | Stop reason: SDUPTHER

## 2020-12-07 RX ORDER — FLUOXETINE HYDROCHLORIDE 40 MG/1
40 CAPSULE ORAL DAILY
Qty: 30 CAPSULE | Refills: 0 | Status: SHIPPED | OUTPATIENT
Start: 2020-12-07 | End: 2020-12-31 | Stop reason: SDUPTHER

## 2020-12-07 RX ORDER — NIFEDIPINE 60 MG/1
60 TABLET, EXTENDED RELEASE ORAL DAILY
Qty: 30 TABLET | Refills: 0 | Status: SHIPPED | OUTPATIENT
Start: 2020-12-07 | End: 2020-12-31 | Stop reason: SDUPTHER

## 2020-12-10 LAB
FLUAV RNA NPH QL NAA+PROBE: NOT DETECTED
FLUBV RNA NPH QL NAA+PROBE: NOT DETECTED
RSV RNA NPH QL NAA+PROBE: NOT DETECTED
SARS-COV-2 RNA NPH QL NAA+PROBE: NOT DETECTED

## 2020-12-11 ENCOUNTER — VBI (OUTPATIENT)
Dept: ADMINISTRATIVE | Facility: OTHER | Age: 50
End: 2020-12-11

## 2020-12-12 ENCOUNTER — TELEPHONE (OUTPATIENT)
Dept: OTHER | Facility: OTHER | Age: 50
End: 2020-12-12

## 2020-12-12 DIAGNOSIS — J40 BRONCHITIS: Primary | ICD-10-CM

## 2020-12-12 RX ORDER — CEFUROXIME AXETIL 500 MG/1
500 TABLET ORAL EVERY 12 HOURS SCHEDULED
Qty: 14 TABLET | Refills: 0 | Status: SHIPPED | OUTPATIENT
Start: 2020-12-12 | End: 2020-12-19

## 2020-12-31 DIAGNOSIS — I15.9 SECONDARY HYPERTENSION: ICD-10-CM

## 2020-12-31 DIAGNOSIS — F41.9 ANXIETY: ICD-10-CM

## 2020-12-31 DIAGNOSIS — G47.00 INSOMNIA, UNSPECIFIED TYPE: ICD-10-CM

## 2020-12-31 DIAGNOSIS — F32.A DEPRESSION, UNSPECIFIED DEPRESSION TYPE: ICD-10-CM

## 2020-12-31 DIAGNOSIS — F33.9 RECURRENT MAJOR DEPRESSIVE DISORDER, REMISSION STATUS UNSPECIFIED (HCC): ICD-10-CM

## 2021-01-04 ENCOUNTER — TELEPHONE (OUTPATIENT)
Dept: FAMILY MEDICINE CLINIC | Facility: HOSPITAL | Age: 51
End: 2021-01-04

## 2021-01-04 RX ORDER — FLUOXETINE HYDROCHLORIDE 40 MG/1
40 CAPSULE ORAL DAILY
Qty: 30 CAPSULE | Refills: 0 | Status: SHIPPED | OUTPATIENT
Start: 2021-01-04 | End: 2021-02-04 | Stop reason: SDUPTHER

## 2021-01-04 RX ORDER — ESZOPICLONE 3 MG/1
3 TABLET, FILM COATED ORAL
Qty: 30 TABLET | Refills: 0 | Status: SHIPPED | OUTPATIENT
Start: 2021-01-04 | End: 2021-01-11 | Stop reason: ALTCHOICE

## 2021-01-04 RX ORDER — ZOLPIDEM TARTRATE 12.5 MG/1
12.5 TABLET, FILM COATED, EXTENDED RELEASE ORAL
Qty: 30 TABLET | Refills: 0 | Status: SHIPPED | OUTPATIENT
Start: 2021-01-04 | End: 2021-01-10 | Stop reason: SDUPTHER

## 2021-01-04 RX ORDER — TRIAMTERENE AND HYDROCHLOROTHIAZIDE 37.5; 25 MG/1; MG/1
1 TABLET ORAL DAILY
Qty: 30 TABLET | Refills: 3 | Status: SHIPPED | OUTPATIENT
Start: 2021-01-04 | End: 2021-02-04 | Stop reason: SDUPTHER

## 2021-01-04 RX ORDER — NIFEDIPINE 60 MG/1
60 TABLET, EXTENDED RELEASE ORAL DAILY
Qty: 30 TABLET | Refills: 3 | Status: SHIPPED | OUTPATIENT
Start: 2021-01-04 | End: 2021-02-04 | Stop reason: SDUPTHER

## 2021-01-04 RX ORDER — CLONAZEPAM 0.5 MG/1
0.5 TABLET ORAL 2 TIMES DAILY PRN
Qty: 30 TABLET | Refills: 0 | Status: SHIPPED | OUTPATIENT
Start: 2021-01-04 | End: 2021-07-29

## 2021-01-10 DIAGNOSIS — G47.00 INSOMNIA, UNSPECIFIED TYPE: ICD-10-CM

## 2021-01-11 RX ORDER — ZOLPIDEM TARTRATE 12.5 MG/1
12.5 TABLET, FILM COATED, EXTENDED RELEASE ORAL
Qty: 30 TABLET | Refills: 0 | Status: SHIPPED | OUTPATIENT
Start: 2021-01-11 | End: 2021-02-04 | Stop reason: SDUPTHER

## 2021-02-04 DIAGNOSIS — G47.00 INSOMNIA, UNSPECIFIED TYPE: ICD-10-CM

## 2021-02-04 DIAGNOSIS — F33.9 RECURRENT MAJOR DEPRESSIVE DISORDER, REMISSION STATUS UNSPECIFIED (HCC): ICD-10-CM

## 2021-02-04 DIAGNOSIS — I15.9 SECONDARY HYPERTENSION: ICD-10-CM

## 2021-02-04 RX ORDER — TRIAMTERENE AND HYDROCHLOROTHIAZIDE 37.5; 25 MG/1; MG/1
1 TABLET ORAL DAILY
Qty: 30 TABLET | Refills: 5 | Status: SHIPPED | OUTPATIENT
Start: 2021-02-04 | End: 2021-06-20 | Stop reason: SDUPTHER

## 2021-02-04 RX ORDER — NIFEDIPINE 60 MG/1
60 TABLET, EXTENDED RELEASE ORAL DAILY
Qty: 30 TABLET | Refills: 5 | Status: SHIPPED | OUTPATIENT
Start: 2021-02-04 | End: 2021-04-24 | Stop reason: SDUPTHER

## 2021-02-04 RX ORDER — ZOLPIDEM TARTRATE 12.5 MG/1
12.5 TABLET, FILM COATED, EXTENDED RELEASE ORAL
Qty: 30 TABLET | Refills: 0 | Status: SHIPPED | OUTPATIENT
Start: 2021-02-04 | End: 2021-02-25 | Stop reason: SDUPTHER

## 2021-02-04 RX ORDER — FLUOXETINE HYDROCHLORIDE 40 MG/1
40 CAPSULE ORAL DAILY
Qty: 30 CAPSULE | Refills: 0 | Status: SHIPPED | OUTPATIENT
Start: 2021-02-04 | End: 2021-07-29 | Stop reason: ALTCHOICE

## 2021-02-25 DIAGNOSIS — G47.00 INSOMNIA, UNSPECIFIED TYPE: ICD-10-CM

## 2021-02-25 RX ORDER — ZOLPIDEM TARTRATE 12.5 MG/1
12.5 TABLET, FILM COATED, EXTENDED RELEASE ORAL
Qty: 30 TABLET | Refills: 0 | Status: SHIPPED | OUTPATIENT
Start: 2021-02-25 | End: 2021-03-20 | Stop reason: SDUPTHER

## 2021-03-20 DIAGNOSIS — G47.00 INSOMNIA, UNSPECIFIED TYPE: ICD-10-CM

## 2021-03-22 RX ORDER — ZOLPIDEM TARTRATE 12.5 MG/1
12.5 TABLET, FILM COATED, EXTENDED RELEASE ORAL
Qty: 30 TABLET | Refills: 0 | Status: SHIPPED | OUTPATIENT
Start: 2021-03-22 | End: 2021-04-24 | Stop reason: SDUPTHER

## 2021-04-07 ENCOUNTER — OFFICE VISIT (OUTPATIENT)
Dept: URGENT CARE | Facility: CLINIC | Age: 51
End: 2021-04-07
Payer: COMMERCIAL

## 2021-04-07 ENCOUNTER — APPOINTMENT (OUTPATIENT)
Dept: RADIOLOGY | Facility: CLINIC | Age: 51
End: 2021-04-07
Payer: COMMERCIAL

## 2021-04-07 VITALS
HEART RATE: 94 BPM | BODY MASS INDEX: 33.23 KG/M2 | DIASTOLIC BLOOD PRESSURE: 75 MMHG | OXYGEN SATURATION: 95 % | WEIGHT: 176 LBS | SYSTOLIC BLOOD PRESSURE: 113 MMHG | HEIGHT: 61 IN | RESPIRATION RATE: 18 BRPM | TEMPERATURE: 98.4 F

## 2021-04-07 DIAGNOSIS — S93.402A SPRAIN OF LEFT ANKLE, UNSPECIFIED LIGAMENT, INITIAL ENCOUNTER: Primary | ICD-10-CM

## 2021-04-07 DIAGNOSIS — S99.912A INJURY OF LEFT ANKLE, INITIAL ENCOUNTER: ICD-10-CM

## 2021-04-07 PROCEDURE — 73610 X-RAY EXAM OF ANKLE: CPT

## 2021-04-07 PROCEDURE — 99213 OFFICE O/P EST LOW 20 MIN: CPT

## 2021-04-07 PROCEDURE — 73630 X-RAY EXAM OF FOOT: CPT

## 2021-04-07 NOTE — PROGRESS NOTES
NAME: Naila Hernandez is a 45 y o  female  : 1970    MRN: [id number]      Assessment and Plan   Sprain of left ankle, unspecified ligament, initial encounter [S93 402A]  1  Sprain of left ankle, unspecified ligament, initial encounter  Ambulatory referral to Orthopedic Surgery   2  Injury of left ankle, initial encounter  XR foot 3+ vw left    XR ankle 3+ vw left         X-ray left ankle:  No acute fractures visualized  Patient placed in an air cast and given crutches along with Education on use  Discussed ice, elevation continued over-the-counter medications and follow-up with orthopedics  She acknowledges    Patient Instructions     Patient Instructions     Ankle Sprain   WHAT YOU NEED TO KNOW:   An ankle sprain happens when 1 or more ligaments in your ankle joint stretch or tear  Ligaments are tough tissues that connect bones  Ligaments support your joints and keep your bones in place  DISCHARGE INSTRUCTIONS:   Return to the emergency department if:   · You have severe pain in your ankle  · Your foot or toes are cold or numb  · Your ankle becomes more weak or unstable (wobbly)  · You are unable to put any weight on your ankle or foot  · Your swelling has increased or returned  Call your doctor if:   · Your pain does not go away, even after treatment  · You have questions or concerns about your condition or care  Medicines: You may need any of the following:  · NSAIDs , such as ibuprofen, help decrease swelling, pain, and fever  This medicine is available with or without a doctor's order  NSAIDs can cause stomach bleeding or kidney problems in certain people  If you take blood thinner medicine, always ask your healthcare provider if NSAIDs are safe for you  Always read the medicine label and follow directions  · Acetaminophen  decreases pain and fever  It is available without a doctor's order  Ask how much to take and how often to take it  Follow directions   Read the labels of all other medicines you are using to see if they also contain acetaminophen, or ask your doctor or pharmacist  Acetaminophen can cause liver damage if not taken correctly  Do not use more than 4 grams (4,000 milligrams) total of acetaminophen in one day  · Prescription pain medicine  may be given  Ask your healthcare provider how to take this medicine safely  Some prescription pain medicines contain acetaminophen  Do not take other medicines that contain acetaminophen without talking to your healthcare provider  Too much acetaminophen may cause liver damage  Prescription pain medicine may cause constipation  Ask your healthcare provider how to prevent or treat constipation  · Take your medicine as directed  Contact your healthcare provider if you think your medicine is not helping or if you have side effects  Tell him or her if you are allergic to any medicine  Keep a list of the medicines, vitamins, and herbs you take  Include the amounts, and when and why you take them  Bring the list or the pill bottles to follow-up visits  Carry your medicine list with you in case of an emergency  Self-care:   · Use support devices,  such as a brace, cast, or splint, to limit your movement and protect your joint  You may need to use crutches to decrease your pain as you move around  · Go to physical therapy as directed  A physical therapist teaches you exercises to help improve movement and strength, and to decrease pain  · Rest  your ankle so that it can heal  Return to normal activities as directed  · Apply ice  on your ankle for 15 to 20 minutes every hour or as directed  Use an ice pack, or put crushed ice in a plastic bag  Cover it with a towel  Ice helps prevent tissue damage and decreases swelling and pain  · Compress  your ankle  Ask if you should wrap an elastic bandage around your injured ligament   An elastic bandage provides support and helps decrease swelling and movement so your joint can heal  Wear as long as directed  · Elevate  your ankle above the level of your heart as often as you can  This will help decrease swelling and pain  Prop your ankle on pillows or blankets to keep it elevated comfortably  Prevent another ankle sprain:   · Let your ankle heal   Find out how long your ligament needs to heal  Do not do any physical activity until your healthcare provider says it is okay  If you start activity too soon, you may develop a more serious injury  · Always warm up and stretch  before you exercise or play sports  · Use the right equipment  Always wear shoes that fit well and are made for the activity that you are doing  You may also need ankle supports, elbow and knee pads, or braces  Follow up with your doctor as directed:  Write down your questions so you remember to ask them during your visits  © Copyright [address] Information is for End User's use only and may not be sold, redistributed or otherwise used for commercial purposes  All illustrations and images included in CareNotes® are the copyrighted property of A D A M , Inc  or [address] [place] [address]  The above information is an  only  It is not intended as medical advice for individual conditions or treatments  Talk to your doctor, nurse or pharmacist before following any medical regimen to see if it is safe and effective for you  Proceed to ER if symptoms worsen  Chief Complaint     Chief Complaint   Patient presents with    Ankle Injury     Pt reports having her left foot buckled from beneath her while she was walking out of work last night  Pt was wearing wedges at the time  Pt c/o lateral left ankle pain (pulsating pain) and worse with pressure  History of Present Illness    Patient presents complaining of left ankle pain x1 day  Reports last night while wearing wedges, she rolled her left ankle  States she has been having pain to the lateral aspect of the ankle since  Reports she tried taking ibuprofen applying ice but continues to have pain, especially with walking  Denies any numbness or tingling to the toes  Reports swelling and bruising to the area  Reports  History of breaking this ankle several years ago  Review of Systems   Review of Systems   Constitutional: Negative for chills and fever     Musculoskeletal:        Left ankle pain         Current Medications       Current Outpatient Medications:     amphetamine-dextroamphetamine (ADDERALL) 20 mg tablet, Take 1 tablet by mouth 2 (two) times a day as needed, Disp: , Rfl: 0    Cholecalciferol 2000 units CAPS, Take by mouth 1 daily, Disp: , Rfl:     gabapentin (NEURONTIN) 300 mg capsule, Take 300 mg by mouth 3 (three) times a day as needed , Disp: , Rfl: 5    Multiple Vitamins-Minerals (MULTIVITAL-M) TABS, Take 1 tablet by mouth daily, Disp: , Rfl:     NIFEdipine (PROCARDIA XL) 60 mg 24 hr tablet, Take 1 tablet (60 mg total) by mouth daily, Disp: 30 tablet, Rfl: 5    SUBOXONE 8-2 MG, TAKE ONE TO ONE & ONE-HALF films UNDER THE TONGUE EVERY DAY, Disp: , Rfl: 0    triamterene-hydrochlorothiazide (MAXZIDE-25) 37 5-25 mg per tablet, Take 1 tablet by mouth daily, Disp: 30 tablet, Rfl: 5    zolpidem (AMBIEN CR) 12 5 MG CR tablet, Take 1 tablet (12 5 mg total) by mouth daily at bedtime, Disp: 30 tablet, Rfl: 0    clonazePAM (KlonoPIN) 0 5 mg tablet, Take 1 tablet (0 5 mg total) by mouth 2 (two) times a day as needed for seizures (Patient not taking: Reported on 4/7/2021), Disp: 30 tablet, Rfl: 0    FLUoxetine (PROzac) 40 MG capsule, Take 1 capsule (40 mg total) by mouth daily (Patient not taking: Reported on 4/7/2021), Disp: 30 capsule, Rfl: 0    Current Allergies     Allergies as of 04/07/2021 - Reviewed 04/07/2021   Allergen Reaction Noted    Shellfish allergy - food allergy Anaphylaxis and Other (See Comments) 02/08/2016    Nsaids Other (See Comments) 02/08/2016    Latex - food allergy Hives and Rash 02/08/2016 Past Medical History:   Diagnosis Date    Anxiety     CHF (congestive heart failure) ([address] )     Hypertension     Insomnia        Past Surgical History:   Procedure Laterality Date    APPENDECTOMY      BACK SURGERY      BREAST SURGERY      CHOLECYSTECTOMY      GASTRIC BYPASS         Family History   Problem Relation Age of Onset    Alzheimer's disease Mother     Cancer Father     Substance Abuse Neg Hx     Mental illness Neg Hx          Medications have been verified  The following portions of the patient's history were reviewed and updated as appropriate: allergies, current medications, past family history, past medical history, past social history, past surgical history and problem list     Objective   /75   Pulse 94   Temp 98 4 °F (36 9 °C) (Tympanic)   Resp 18   Ht 5' 1" (1 549 m)   Wt 79 8 kg (176 lb)   SpO2 95%   BMI 33 25 kg/m²      Physical Exam     Physical Exam  Vitals signs and nursing note reviewed  Constitutional:       General: She is not in acute distress  Appearance: Normal appearance  She is not ill-appearing, toxic-appearing or diaphoretic  Musculoskeletal:      Comments: Left ankle: With some edema and ecchymosis overlying and surrounding the lateral malleolus  No open wounds, abrasions or erythema  No abnormal warmth  Tender to palpation over the lateral malleolus, ATF and surrounding soft tissues  No proximal fibular tenderness or proximal 5th metatarsal tenderness  Decreased dorsiflexion and plantar flexion due to pain  Decreased strength due to pain and refusal   Pedal pulse 2 +  Sensation to light touch  Intact throughout foot and ankle  Capillary refill toes less than 2 seconds  Neurological:      Mental Status: She is alert

## 2021-04-07 NOTE — PATIENT INSTRUCTIONS
Ankle Sprain   WHAT YOU NEED TO KNOW:   An ankle sprain happens when 1 or more ligaments in your ankle joint stretch or tear  Ligaments are tough tissues that connect bones  Ligaments support your joints and keep your bones in place  DISCHARGE INSTRUCTIONS:   Return to the emergency department if:   · You have severe pain in your ankle  · Your foot or toes are cold or numb  · Your ankle becomes more weak or unstable (wobbly)  · You are unable to put any weight on your ankle or foot  · Your swelling has increased or returned  Call your doctor if:   · Your pain does not go away, even after treatment  · You have questions or concerns about your condition or care  Medicines: You may need any of the following:  · NSAIDs , such as ibuprofen, help decrease swelling, pain, and fever  This medicine is available with or without a doctor's order  NSAIDs can cause stomach bleeding or kidney problems in certain people  If you take blood thinner medicine, always ask your healthcare provider if NSAIDs are safe for you  Always read the medicine label and follow directions  · Acetaminophen  decreases pain and fever  It is available without a doctor's order  Ask how much to take and how often to take it  Follow directions  Read the labels of all other medicines you are using to see if they also contain acetaminophen, or ask your doctor or pharmacist  Acetaminophen can cause liver damage if not taken correctly  Do not use more than 4 grams (4,000 milligrams) total of acetaminophen in one day  · Prescription pain medicine  may be given  Ask your healthcare provider how to take this medicine safely  Some prescription pain medicines contain acetaminophen  Do not take other medicines that contain acetaminophen without talking to your healthcare provider  Too much acetaminophen may cause liver damage  Prescription pain medicine may cause constipation   Ask your healthcare provider how to prevent or treat constipation  · Take your medicine as directed  Contact your healthcare provider if you think your medicine is not helping or if you have side effects  Tell him or her if you are allergic to any medicine  Keep a list of the medicines, vitamins, and herbs you take  Include the amounts, and when and why you take them  Bring the list or the pill bottles to follow-up visits  Carry your medicine list with you in case of an emergency  Self-care:   · Use support devices,  such as a brace, cast, or splint, to limit your movement and protect your joint  You may need to use crutches to decrease your pain as you move around  · Go to physical therapy as directed  A physical therapist teaches you exercises to help improve movement and strength, and to decrease pain  · Rest  your ankle so that it can heal  Return to normal activities as directed  · Apply ice  on your ankle for 15 to 20 minutes every hour or as directed  Use an ice pack, or put crushed ice in a plastic bag  Cover it with a towel  Ice helps prevent tissue damage and decreases swelling and pain  · Compress  your ankle  Ask if you should wrap an elastic bandage around your injured ligament  An elastic bandage provides support and helps decrease swelling and movement so your joint can heal  Wear as long as directed  · Elevate  your ankle above the level of your heart as often as you can  This will help decrease swelling and pain  Prop your ankle on pillows or blankets to keep it elevated comfortably  Prevent another ankle sprain:   · Let your ankle heal   Find out how long your ligament needs to heal  Do not do any physical activity until your healthcare provider says it is okay  If you start activity too soon, you may develop a more serious injury  · Always warm up and stretch  before you exercise or play sports  · Use the right equipment  Always wear shoes that fit well and are made for the activity that you are doing   You may also need ankle supports, elbow and knee pads, or braces  Follow up with your doctor as directed:  Write down your questions so you remember to ask them during your visits  © Copyright [address] Information is for End User's use only and may not be sold, redistributed or otherwise used for commercial purposes  All illustrations and images included in CareNotes® are the copyrighted property of A D A M , Inc  or [facility] Jorje Mosqueda   The above information is an  only  It is not intended as medical advice for individual conditions or treatments  Talk to your doctor, nurse or pharmacist before following any medical regimen to see if it is safe and effective for you

## 2021-04-24 DIAGNOSIS — G47.00 INSOMNIA, UNSPECIFIED TYPE: ICD-10-CM

## 2021-04-24 DIAGNOSIS — I15.9 SECONDARY HYPERTENSION: ICD-10-CM

## 2021-04-26 RX ORDER — ZOLPIDEM TARTRATE 12.5 MG/1
12.5 TABLET, FILM COATED, EXTENDED RELEASE ORAL
Qty: 30 TABLET | Refills: 0 | Status: SHIPPED | OUTPATIENT
Start: 2021-04-26 | End: 2021-05-20 | Stop reason: SDUPTHER

## 2021-04-26 RX ORDER — NIFEDIPINE 60 MG/1
60 TABLET, EXTENDED RELEASE ORAL DAILY
Qty: 30 TABLET | Refills: 2 | Status: SHIPPED | OUTPATIENT
Start: 2021-04-26 | End: 2021-05-20 | Stop reason: SDUPTHER

## 2021-05-20 DIAGNOSIS — I15.9 SECONDARY HYPERTENSION: ICD-10-CM

## 2021-05-20 DIAGNOSIS — G47.00 INSOMNIA, UNSPECIFIED TYPE: ICD-10-CM

## 2021-05-20 RX ORDER — ZOLPIDEM TARTRATE 12.5 MG/1
12.5 TABLET, FILM COATED, EXTENDED RELEASE ORAL
Qty: 30 TABLET | Refills: 0 | Status: SHIPPED | OUTPATIENT
Start: 2021-05-20 | End: 2021-06-20 | Stop reason: SDUPTHER

## 2021-05-20 RX ORDER — NIFEDIPINE 60 MG/1
60 TABLET, EXTENDED RELEASE ORAL DAILY
Qty: 30 TABLET | Refills: 0 | Status: SHIPPED | OUTPATIENT
Start: 2021-05-20 | End: 2021-06-20 | Stop reason: SDUPTHER

## 2021-07-22 ENCOUNTER — RA CDI HCC (OUTPATIENT)
Dept: OTHER | Facility: HOSPITAL | Age: 51
End: 2021-07-22

## 2021-07-22 NOTE — PROGRESS NOTES
[facility]  [facility]          Chart reviewed, no opportunity found: CHART REVIEWED, NO OPPORTUNITY FOUND                     Patients insurance company:  [employer] [facility] (Medicare Advantage and Commercial)

## 2021-07-29 ENCOUNTER — OFFICE VISIT (OUTPATIENT)
Dept: FAMILY MEDICINE CLINIC | Facility: HOSPITAL | Age: 51
End: 2021-07-29
Payer: COMMERCIAL

## 2021-07-29 VITALS
RESPIRATION RATE: 16 BRPM | DIASTOLIC BLOOD PRESSURE: 80 MMHG | OXYGEN SATURATION: 97 % | HEIGHT: 61 IN | WEIGHT: 174 LBS | HEART RATE: 87 BPM | SYSTOLIC BLOOD PRESSURE: 110 MMHG | BODY MASS INDEX: 32.85 KG/M2

## 2021-07-29 DIAGNOSIS — M54.16 ACUTE LUMBAR RADICULOPATHY: ICD-10-CM

## 2021-07-29 DIAGNOSIS — I10 BENIGN ESSENTIAL HTN: ICD-10-CM

## 2021-07-29 DIAGNOSIS — Z12.31 ENCOUNTER FOR SCREENING MAMMOGRAM FOR MALIGNANT NEOPLASM OF BREAST: ICD-10-CM

## 2021-07-29 DIAGNOSIS — I10 ESSENTIAL HYPERTENSION: ICD-10-CM

## 2021-07-29 DIAGNOSIS — G47.00 INSOMNIA, UNSPECIFIED TYPE: ICD-10-CM

## 2021-07-29 DIAGNOSIS — Z12.11 SCREENING FOR COLON CANCER: ICD-10-CM

## 2021-07-29 DIAGNOSIS — R79.89 LOW VITAMIN D LEVEL: ICD-10-CM

## 2021-07-29 DIAGNOSIS — F32.A DEPRESSION, UNSPECIFIED DEPRESSION TYPE: Primary | ICD-10-CM

## 2021-07-29 DIAGNOSIS — F41.9 ANXIETY: ICD-10-CM

## 2021-07-29 PROBLEM — J32.9 SINUSITIS, BACTERIAL: Status: ACTIVE | Noted: 2017-06-23

## 2021-07-29 PROBLEM — B96.89 SINUSITIS, BACTERIAL: Status: ACTIVE | Noted: 2017-06-23

## 2021-07-29 PROCEDURE — 99214 OFFICE O/P EST MOD 30 MIN: CPT | Performed by: PHYSICIAN ASSISTANT

## 2021-07-29 PROCEDURE — 3725F SCREEN DEPRESSION PERFORMED: CPT | Performed by: PHYSICIAN ASSISTANT

## 2021-07-29 PROCEDURE — 3008F BODY MASS INDEX DOCD: CPT | Performed by: PHYSICIAN ASSISTANT

## 2021-07-29 PROCEDURE — 1036F TOBACCO NON-USER: CPT | Performed by: PHYSICIAN ASSISTANT

## 2021-07-29 RX ORDER — BUPROPION HYDROCHLORIDE 150 MG/1
150 TABLET ORAL EVERY MORNING
Qty: 30 TABLET | Refills: 5 | Status: SHIPPED | OUTPATIENT
Start: 2021-07-29 | End: 2021-08-12 | Stop reason: SDUPTHER

## 2021-07-29 RX ORDER — CLONAZEPAM 0.5 MG/1
0.5 TABLET ORAL 2 TIMES DAILY PRN
Qty: 30 TABLET | Refills: 3 | Status: SHIPPED | OUTPATIENT
Start: 2021-07-29 | End: 2021-10-17 | Stop reason: SDUPTHER

## 2021-07-29 NOTE — PROGRESS NOTES
Assessment/Plan:       Problem List Items Addressed This Visit     None      Visit Diagnoses     Encounter for screening mammogram for malignant neoplasm of breast    -  Primary    Relevant Orders    Mammo screening bilateral w 3d & cad    Depression, unspecified depression type        Relevant Medications    clonazePAM (KlonoPIN) 0 5 mg tablet    buPROPion (WELLBUTRIN XL) 150 mg 24 hr tablet    Insomnia, unspecified type        Relevant Medications    clonazePAM (KlonoPIN) 0 5 mg tablet    Anxiety        Relevant Medications    clonazePAM (KlonoPIN) 0 5 mg tablet            Subjective:      Patient ID: Sharon Yo is a 45 y o  female  Presents for follow up  Mom  2020  Recently started counseling  Has some anxiety  Has no sex drive  Gets occasional edema/foot swelling  Sees Dr Boni Leyva, for Adderall and Suboxone  Review of Systems   Constitutional: Negative for chills, diaphoresis, fatigue and fever  Respiratory: Negative for cough, chest tightness and shortness of breath  Gastrointestinal: Negative for abdominal pain, constipation, diarrhea, nausea and vomiting  Endocrine: Negative for polydipsia, polyphagia and polyuria  Musculoskeletal: Positive for back pain, myalgias, neck pain and neck stiffness  Negative for arthralgias  Neurological: Positive for numbness  Psychiatric/Behavioral: Positive for agitation, decreased concentration, dysphoric mood and sleep disturbance  Negative for self-injury and suicidal ideas  The patient is nervous/anxious  Feels mildly depressed  Objective:      /80   Pulse 87   Resp 16   Ht 5' 1" (1 549 m)   Wt 78 9 kg (174 lb)   SpO2 97%   BMI 32 88 kg/m²          Physical Exam  Vitals and nursing note reviewed  Constitutional:       General: She is not in acute distress  Appearance: Normal appearance  She is obese  She is not ill-appearing, toxic-appearing or diaphoretic     HENT:      Head: Normocephalic and atraumatic  Cardiovascular:      Rate and Rhythm: Normal rate and regular rhythm  Pulses: Normal pulses  Heart sounds: Normal heart sounds  Pulmonary:      Effort: Pulmonary effort is normal  No respiratory distress  Breath sounds: Normal breath sounds  No stridor  No wheezing or rhonchi  Musculoskeletal:         General: No swelling, tenderness, deformity or signs of injury  Normal range of motion  Right lower leg: No edema  Left lower leg: No edema  Neurological:      General: No focal deficit present  Mental Status: She is alert and oriented to person, place, and time  Cranial Nerves: No cranial nerve deficit  Sensory: No sensory deficit  Motor: No weakness  Coordination: Coordination normal    Psychiatric:         Mood and Affect: Mood normal          Behavior: Behavior normal          Thought Content: Thought content normal          Judgment: Judgment normal          BMI Counseling: Body mass index is 32 88 kg/m²  The BMI is above normal  Nutrition recommendations include 3-5 servings of fruits/vegetables daily

## 2021-08-12 DIAGNOSIS — I15.9 SECONDARY HYPERTENSION: ICD-10-CM

## 2021-08-12 DIAGNOSIS — F32.A DEPRESSION, UNSPECIFIED DEPRESSION TYPE: ICD-10-CM

## 2021-08-12 RX ORDER — NIFEDIPINE 60 MG/1
60 TABLET, EXTENDED RELEASE ORAL DAILY
Qty: 90 TABLET | Refills: 1 | Status: SHIPPED | OUTPATIENT
Start: 2021-08-12 | End: 2021-10-17 | Stop reason: SDUPTHER

## 2021-08-12 RX ORDER — BUPROPION HYDROCHLORIDE 150 MG/1
150 TABLET ORAL EVERY MORNING
Qty: 90 TABLET | Refills: 1 | Status: SHIPPED | OUTPATIENT
Start: 2021-08-12 | End: 2021-10-17 | Stop reason: SDUPTHER

## 2021-08-12 RX ORDER — TRIAMTERENE AND HYDROCHLOROTHIAZIDE 37.5; 25 MG/1; MG/1
1 TABLET ORAL DAILY
Qty: 90 TABLET | Refills: 1 | Status: SHIPPED | OUTPATIENT
Start: 2021-08-12 | End: 2021-10-17 | Stop reason: SDUPTHER

## 2021-08-27 DIAGNOSIS — F98.8 ATTENTION DEFICIT DISORDER, UNSPECIFIED HYPERACTIVITY PRESENCE: ICD-10-CM

## 2021-08-27 RX ORDER — DEXTROAMPHETAMINE SACCHARATE, AMPHETAMINE ASPARTATE, DEXTROAMPHETAMINE SULFATE AND AMPHETAMINE SULFATE 5; 5; 5; 5 MG/1; MG/1; MG/1; MG/1
1 TABLET ORAL 2 TIMES DAILY PRN
Qty: 60 TABLET | Refills: 0 | Status: SHIPPED | OUTPATIENT
Start: 2021-08-27 | End: 2021-09-02 | Stop reason: SDUPTHER

## 2021-08-31 LAB
25(OH)D3+25(OH)D2 SERPL-MCNC: 35.5 NG/ML (ref 30–100)
ALBUMIN SERPL-MCNC: 4.3 G/DL (ref 3.8–4.9)
ALBUMIN/GLOB SERPL: 2.5 {RATIO} (ref 1.2–2.2)
ALP SERPL-CCNC: 102 IU/L (ref 48–121)
ALT SERPL-CCNC: 9 IU/L (ref 0–32)
AST SERPL-CCNC: 19 IU/L (ref 0–40)
BASOPHILS # BLD AUTO: 0 X10E3/UL (ref 0–0.2)
BASOPHILS NFR BLD AUTO: 1 %
BILIRUB SERPL-MCNC: 0.3 MG/DL (ref 0–1.2)
BUN SERPL-MCNC: 13 MG/DL (ref 6–24)
BUN/CREAT SERPL: 19 (ref 9–23)
CALCIUM SERPL-MCNC: 9.2 MG/DL (ref 8.7–10.2)
CHLORIDE SERPL-SCNC: 100 MMOL/L (ref 96–106)
CHOLEST SERPL-MCNC: 157 MG/DL (ref 100–199)
CO2 SERPL-SCNC: 29 MMOL/L (ref 20–29)
CREAT SERPL-MCNC: 0.67 MG/DL (ref 0.57–1)
EOSINOPHIL # BLD AUTO: 0.2 X10E3/UL (ref 0–0.4)
EOSINOPHIL NFR BLD AUTO: 2 %
ERYTHROCYTE [DISTWIDTH] IN BLOOD BY AUTOMATED COUNT: 12.8 % (ref 11.7–15.4)
GLOBULIN SER-MCNC: 1.7 G/DL (ref 1.5–4.5)
GLUCOSE SERPL-MCNC: 80 MG/DL (ref 65–99)
HBA1C MFR BLD: 5.6 % (ref 4.8–5.6)
HCT VFR BLD AUTO: 42.7 % (ref 34–46.6)
HDLC SERPL-MCNC: 61 MG/DL
HGB BLD-MCNC: 13.6 G/DL (ref 11.1–15.9)
IMM GRANULOCYTES # BLD: 0 X10E3/UL (ref 0–0.1)
IMM GRANULOCYTES NFR BLD: 0 %
LDLC SERPL CALC-MCNC: 81 MG/DL (ref 0–99)
LYMPHOCYTES # BLD AUTO: 2.9 X10E3/UL (ref 0.7–3.1)
LYMPHOCYTES NFR BLD AUTO: 33 %
MCH RBC QN AUTO: 27.6 PG (ref 26.6–33)
MCHC RBC AUTO-ENTMCNC: 31.9 G/DL (ref 31.5–35.7)
MCV RBC AUTO: 87 FL (ref 79–97)
MONOCYTES # BLD AUTO: 0.7 X10E3/UL (ref 0.1–0.9)
MONOCYTES NFR BLD AUTO: 8 %
NEUTROPHILS # BLD AUTO: 5 X10E3/UL (ref 1.4–7)
NEUTROPHILS NFR BLD AUTO: 56 %
PLATELET # BLD AUTO: 356 X10E3/UL (ref 150–450)
POTASSIUM SERPL-SCNC: 4 MMOL/L (ref 3.5–5.2)
PROT SERPL-MCNC: 6 G/DL (ref 6–8.5)
RBC # BLD AUTO: 4.93 X10E6/UL (ref 3.77–5.28)
SL AMB EGFR AFRICAN AMERICAN: 117 ML/MIN/1.73
SL AMB EGFR NON AFRICAN AMERICAN: 101 ML/MIN/1.73
SL AMB VLDL CHOLESTEROL CALC: 15 MG/DL (ref 5–40)
SODIUM SERPL-SCNC: 141 MMOL/L (ref 134–144)
TRIGL SERPL-MCNC: 79 MG/DL (ref 0–149)
TSH SERPL DL<=0.005 MIU/L-ACNC: 2.07 UIU/ML (ref 0.45–4.5)
WBC # BLD AUTO: 8.9 X10E3/UL (ref 3.4–10.8)

## 2021-09-02 DIAGNOSIS — F98.8 ATTENTION DEFICIT DISORDER, UNSPECIFIED HYPERACTIVITY PRESENCE: ICD-10-CM

## 2021-09-03 RX ORDER — DEXTROAMPHETAMINE SACCHARATE, AMPHETAMINE ASPARTATE, DEXTROAMPHETAMINE SULFATE AND AMPHETAMINE SULFATE 5; 5; 5; 5 MG/1; MG/1; MG/1; MG/1
1 TABLET ORAL 2 TIMES DAILY PRN
Qty: 60 TABLET | Refills: 0 | Status: SHIPPED | OUTPATIENT
Start: 2021-09-03 | End: 2021-10-17 | Stop reason: SDUPTHER

## 2021-10-17 DIAGNOSIS — G47.00 INSOMNIA, UNSPECIFIED TYPE: ICD-10-CM

## 2021-10-17 DIAGNOSIS — F41.9 ANXIETY: ICD-10-CM

## 2021-10-17 DIAGNOSIS — F32.A DEPRESSION, UNSPECIFIED DEPRESSION TYPE: ICD-10-CM

## 2021-10-17 DIAGNOSIS — F98.8 ATTENTION DEFICIT DISORDER, UNSPECIFIED HYPERACTIVITY PRESENCE: ICD-10-CM

## 2021-10-18 RX ORDER — CLONAZEPAM 0.5 MG/1
0.5 TABLET ORAL 2 TIMES DAILY PRN
Qty: 30 TABLET | Refills: 0 | Status: SHIPPED | OUTPATIENT
Start: 2021-10-18 | End: 2021-11-14 | Stop reason: SDUPTHER

## 2021-10-18 RX ORDER — ZOLPIDEM TARTRATE 12.5 MG/1
12.5 TABLET, FILM COATED, EXTENDED RELEASE ORAL
Qty: 30 TABLET | Refills: 0 | Status: SHIPPED | OUTPATIENT
Start: 2021-10-18 | End: 2021-11-14 | Stop reason: SDUPTHER

## 2021-10-18 RX ORDER — DEXTROAMPHETAMINE SACCHARATE, AMPHETAMINE ASPARTATE, DEXTROAMPHETAMINE SULFATE AND AMPHETAMINE SULFATE 5; 5; 5; 5 MG/1; MG/1; MG/1; MG/1
1 TABLET ORAL 2 TIMES DAILY PRN
Qty: 60 TABLET | Refills: 0 | Status: SHIPPED | OUTPATIENT
Start: 2021-10-18 | End: 2021-11-10 | Stop reason: ALTCHOICE

## 2021-11-14 DIAGNOSIS — F41.9 ANXIETY: ICD-10-CM

## 2021-11-14 DIAGNOSIS — F32.A DEPRESSION, UNSPECIFIED DEPRESSION TYPE: ICD-10-CM

## 2021-11-14 DIAGNOSIS — G47.00 INSOMNIA, UNSPECIFIED TYPE: ICD-10-CM

## 2021-11-15 RX ORDER — CLONAZEPAM 0.5 MG/1
0.5 TABLET ORAL 2 TIMES DAILY PRN
Qty: 30 TABLET | Refills: 0 | Status: SHIPPED | OUTPATIENT
Start: 2021-11-15 | End: 2021-12-07 | Stop reason: SDUPTHER

## 2021-11-15 RX ORDER — ZOLPIDEM TARTRATE 12.5 MG/1
12.5 TABLET, FILM COATED, EXTENDED RELEASE ORAL
Qty: 30 TABLET | Refills: 0 | Status: SHIPPED | OUTPATIENT
Start: 2021-11-15 | End: 2021-12-07 | Stop reason: SDUPTHER

## 2021-12-07 DIAGNOSIS — F32.A DEPRESSION, UNSPECIFIED DEPRESSION TYPE: ICD-10-CM

## 2021-12-07 DIAGNOSIS — F98.8 ATTENTION DEFICIT DISORDER, UNSPECIFIED HYPERACTIVITY PRESENCE: ICD-10-CM

## 2021-12-07 DIAGNOSIS — F41.9 ANXIETY: ICD-10-CM

## 2021-12-07 DIAGNOSIS — G47.00 INSOMNIA, UNSPECIFIED TYPE: ICD-10-CM

## 2021-12-08 RX ORDER — ZOLPIDEM TARTRATE 12.5 MG/1
12.5 TABLET, FILM COATED, EXTENDED RELEASE ORAL
Qty: 30 TABLET | Refills: 0 | Status: SHIPPED | OUTPATIENT
Start: 2021-12-08 | End: 2022-01-12 | Stop reason: SDUPTHER

## 2021-12-08 RX ORDER — CLONAZEPAM 0.5 MG/1
0.5 TABLET ORAL 2 TIMES DAILY PRN
Qty: 30 TABLET | Refills: 0 | Status: SHIPPED | OUTPATIENT
Start: 2021-12-08 | End: 2022-01-12 | Stop reason: SDUPTHER

## 2021-12-29 DIAGNOSIS — F98.8 ATTENTION DEFICIT DISORDER, UNSPECIFIED HYPERACTIVITY PRESENCE: ICD-10-CM

## 2022-01-12 DIAGNOSIS — F41.9 ANXIETY: ICD-10-CM

## 2022-01-12 DIAGNOSIS — G47.00 INSOMNIA, UNSPECIFIED TYPE: ICD-10-CM

## 2022-01-12 DIAGNOSIS — F32.A DEPRESSION, UNSPECIFIED DEPRESSION TYPE: ICD-10-CM

## 2022-01-12 RX ORDER — ZOLPIDEM TARTRATE 12.5 MG/1
12.5 TABLET, FILM COATED, EXTENDED RELEASE ORAL
Qty: 30 TABLET | Refills: 0 | Status: SHIPPED | OUTPATIENT
Start: 2022-01-12 | End: 2022-01-27 | Stop reason: SDUPTHER

## 2022-01-12 RX ORDER — CLONAZEPAM 0.5 MG/1
0.5 TABLET ORAL 2 TIMES DAILY PRN
Qty: 30 TABLET | Refills: 0 | Status: SHIPPED | OUTPATIENT
Start: 2022-01-12 | End: 2022-01-27 | Stop reason: SDUPTHER

## 2022-01-27 DIAGNOSIS — G47.00 INSOMNIA, UNSPECIFIED TYPE: ICD-10-CM

## 2022-01-27 DIAGNOSIS — F32.A DEPRESSION, UNSPECIFIED DEPRESSION TYPE: ICD-10-CM

## 2022-01-27 DIAGNOSIS — F41.9 ANXIETY: ICD-10-CM

## 2022-01-27 DIAGNOSIS — I15.9 SECONDARY HYPERTENSION: ICD-10-CM

## 2022-01-27 DIAGNOSIS — F98.8 ATTENTION DEFICIT DISORDER, UNSPECIFIED HYPERACTIVITY PRESENCE: ICD-10-CM

## 2022-01-27 RX ORDER — TRIAMTERENE AND HYDROCHLOROTHIAZIDE 37.5; 25 MG/1; MG/1
1 TABLET ORAL DAILY
Qty: 90 TABLET | Refills: 0 | Status: SHIPPED | OUTPATIENT
Start: 2022-01-27 | End: 2022-04-21 | Stop reason: SDUPTHER

## 2022-01-27 RX ORDER — CLONAZEPAM 0.5 MG/1
0.5 TABLET ORAL 2 TIMES DAILY PRN
Qty: 30 TABLET | Refills: 0 | Status: SHIPPED | OUTPATIENT
Start: 2022-01-27 | End: 2022-03-03 | Stop reason: SDUPTHER

## 2022-01-27 RX ORDER — ZOLPIDEM TARTRATE 12.5 MG/1
12.5 TABLET, FILM COATED, EXTENDED RELEASE ORAL
Qty: 30 TABLET | Refills: 0 | Status: SHIPPED | OUTPATIENT
Start: 2022-01-27 | End: 2022-03-03 | Stop reason: SDUPTHER

## 2022-03-03 DIAGNOSIS — F32.A DEPRESSION, UNSPECIFIED DEPRESSION TYPE: ICD-10-CM

## 2022-03-03 DIAGNOSIS — F41.9 ANXIETY: ICD-10-CM

## 2022-03-03 DIAGNOSIS — F98.8 ATTENTION DEFICIT DISORDER, UNSPECIFIED HYPERACTIVITY PRESENCE: ICD-10-CM

## 2022-03-03 DIAGNOSIS — G47.00 INSOMNIA, UNSPECIFIED TYPE: ICD-10-CM

## 2022-03-03 NOTE — TELEPHONE ENCOUNTER
Patient scheduled for fu appt with pcp - will need short supply to hold her over until appt - [facility]

## 2022-03-04 ENCOUNTER — TELEPHONE (OUTPATIENT)
Dept: FAMILY MEDICINE CLINIC | Facility: HOSPITAL | Age: 52
End: 2022-03-04

## 2022-03-04 RX ORDER — ZOLPIDEM TARTRATE 12.5 MG/1
12.5 TABLET, FILM COATED, EXTENDED RELEASE ORAL
Qty: 30 TABLET | Refills: 0 | Status: SHIPPED | OUTPATIENT
Start: 2022-03-04 | End: 2022-04-03 | Stop reason: SDUPTHER

## 2022-03-04 RX ORDER — CLONAZEPAM 0.5 MG/1
0.5 TABLET ORAL 2 TIMES DAILY PRN
Qty: 30 TABLET | Refills: 0 | Status: SHIPPED | OUTPATIENT
Start: 2022-03-04 | End: 2022-03-07 | Stop reason: SDUPTHER

## 2022-03-25 ENCOUNTER — RA CDI HCC (OUTPATIENT)
Dept: OTHER | Facility: HOSPITAL | Age: 52
End: 2022-03-25

## 2022-03-25 NOTE — PROGRESS NOTES
[facility]  coding opportunities       Chart reviewed, no opportunity found: CHART REVIEWED, NO OPPORTUNITY FOUND        Patients Insurance        Commercial Insurance: [employer]

## 2022-04-01 ENCOUNTER — OFFICE VISIT (OUTPATIENT)
Dept: FAMILY MEDICINE CLINIC | Facility: HOSPITAL | Age: 52
End: 2022-04-01
Payer: COMMERCIAL

## 2022-04-01 VITALS
SYSTOLIC BLOOD PRESSURE: 130 MMHG | OXYGEN SATURATION: 90 % | BODY MASS INDEX: 32.28 KG/M2 | HEART RATE: 89 BPM | HEIGHT: 61 IN | WEIGHT: 171 LBS | DIASTOLIC BLOOD PRESSURE: 80 MMHG

## 2022-04-01 DIAGNOSIS — F98.8 ATTENTION DEFICIT DISORDER, UNSPECIFIED HYPERACTIVITY PRESENCE: Primary | ICD-10-CM

## 2022-04-01 DIAGNOSIS — F11.20 CONTINUOUS OPIOID DEPENDENCE (HCC): ICD-10-CM

## 2022-04-01 PROBLEM — M50.222 HERNIATED NUCLEUS PULPOSUS, C5-6 LEFT: Status: ACTIVE | Noted: 2022-04-01

## 2022-04-01 PROCEDURE — 99213 OFFICE O/P EST LOW 20 MIN: CPT | Performed by: PHYSICIAN ASSISTANT

## 2022-04-01 PROCEDURE — 3725F SCREEN DEPRESSION PERFORMED: CPT | Performed by: PHYSICIAN ASSISTANT

## 2022-04-01 PROCEDURE — 1036F TOBACCO NON-USER: CPT | Performed by: PHYSICIAN ASSISTANT

## 2022-04-01 PROCEDURE — 3008F BODY MASS INDEX DOCD: CPT | Performed by: PHYSICIAN ASSISTANT

## 2022-04-01 RX ORDER — TERBINAFINE HYDROCHLORIDE 250 MG/1
TABLET ORAL
COMMUNITY
Start: 2021-12-24 | End: 2022-04-22 | Stop reason: SDUPTHER

## 2022-04-01 NOTE — PROGRESS NOTES
Assessment/Plan:       Problem List Items Addressed This Visit        Other      Other Visit Diagnoses     Attention deficit disorder, unspecified hyperactivity presence    -  Primary    Relevant Medications    lisdexamfetamine (VYVANSE) 30 MG capsule            Subjective:      Patient ID: Price Sykes is a 45 y o  female  Presents with evaluation; routine follow up  Started Vyvanse for ADD meds  Very stressed, busy at work, patient is a counselor/therapist;   able to manage  Last blood work done 8/21  Review of Systems   Respiratory: Negative for cough, chest tightness and shortness of breath  Gastrointestinal: Negative for abdominal pain, constipation, diarrhea, nausea and vomiting  Psychiatric/Behavioral: Positive for decreased concentration and sleep disturbance  Negative for dysphoric mood, self-injury and suicidal ideas  The patient is not nervous/anxious  Objective:      /90 (BP Location: Left arm, Patient Position: Sitting, Cuff Size: Standard)   Pulse 89   Ht 5' 1" (1 549 m)   Wt 77 6 kg (171 lb)   SpO2 90%   BMI 32 31 kg/m²          Physical Exam  Constitutional:       General: She is not in acute distress  Appearance: Normal appearance  She is not ill-appearing, toxic-appearing or diaphoretic  Cardiovascular:      Rate and Rhythm: Normal rate and regular rhythm  Pulses: Normal pulses  Heart sounds: Normal heart sounds  Pulmonary:      Effort: Pulmonary effort is normal  No respiratory distress  Breath sounds: Normal breath sounds  No stridor  No wheezing or rhonchi  Musculoskeletal:         General: No swelling, tenderness, deformity or signs of injury  Normal range of motion  Right lower leg: No edema  Left lower leg: No edema  Neurological:      General: No focal deficit present  Mental Status: She is alert and oriented to person, place, and time     Psychiatric:         Mood and Affect: Mood normal  Behavior: Behavior normal          Thought Content:  Thought content normal          Judgment: Judgment normal

## 2022-04-03 DIAGNOSIS — G47.00 INSOMNIA, UNSPECIFIED TYPE: ICD-10-CM

## 2022-04-03 DIAGNOSIS — F41.9 ANXIETY: ICD-10-CM

## 2022-04-03 DIAGNOSIS — F32.A DEPRESSION, UNSPECIFIED DEPRESSION TYPE: ICD-10-CM

## 2022-04-04 RX ORDER — CLONAZEPAM 0.5 MG/1
0.5 TABLET ORAL 2 TIMES DAILY PRN
Qty: 30 TABLET | Refills: 0 | Status: SHIPPED | OUTPATIENT
Start: 2022-04-04 | End: 2022-04-21 | Stop reason: SDUPTHER

## 2022-04-04 RX ORDER — ZOLPIDEM TARTRATE 12.5 MG/1
12.5 TABLET, FILM COATED, EXTENDED RELEASE ORAL
Qty: 30 TABLET | Refills: 0 | Status: SHIPPED | OUTPATIENT
Start: 2022-04-04 | End: 2022-04-21 | Stop reason: SDUPTHER

## 2022-04-21 DIAGNOSIS — F98.8 ATTENTION DEFICIT DISORDER, UNSPECIFIED HYPERACTIVITY PRESENCE: ICD-10-CM

## 2022-04-21 DIAGNOSIS — F41.9 ANXIETY: ICD-10-CM

## 2022-04-21 DIAGNOSIS — F32.A DEPRESSION, UNSPECIFIED DEPRESSION TYPE: ICD-10-CM

## 2022-04-21 DIAGNOSIS — G47.00 INSOMNIA, UNSPECIFIED TYPE: ICD-10-CM

## 2022-04-22 RX ORDER — CLONAZEPAM 0.5 MG/1
0.5 TABLET ORAL 2 TIMES DAILY PRN
Qty: 30 TABLET | Refills: 0 | Status: SHIPPED | OUTPATIENT
Start: 2022-04-22 | End: 2022-06-14 | Stop reason: SDUPTHER

## 2022-04-22 RX ORDER — ZOLPIDEM TARTRATE 12.5 MG/1
12.5 TABLET, FILM COATED, EXTENDED RELEASE ORAL
Qty: 30 TABLET | Refills: 0 | Status: SHIPPED | OUTPATIENT
Start: 2022-04-22 | End: 2022-05-23 | Stop reason: SDUPTHER

## 2022-05-23 DIAGNOSIS — F98.8 ATTENTION DEFICIT DISORDER, UNSPECIFIED HYPERACTIVITY PRESENCE: ICD-10-CM

## 2022-05-23 DIAGNOSIS — G47.00 INSOMNIA, UNSPECIFIED TYPE: ICD-10-CM

## 2022-05-25 RX ORDER — ZOLPIDEM TARTRATE 12.5 MG/1
12.5 TABLET, FILM COATED, EXTENDED RELEASE ORAL
Qty: 30 TABLET | Refills: 0 | Status: SHIPPED | OUTPATIENT
Start: 2022-05-25 | End: 2022-06-30 | Stop reason: SDUPTHER

## 2022-05-31 ENCOUNTER — OFFICE VISIT (OUTPATIENT)
Dept: FAMILY MEDICINE CLINIC | Facility: HOSPITAL | Age: 52
End: 2022-05-31
Payer: COMMERCIAL

## 2022-05-31 VITALS
SYSTOLIC BLOOD PRESSURE: 130 MMHG | WEIGHT: 174.8 LBS | OXYGEN SATURATION: 97 % | HEART RATE: 84 BPM | BODY MASS INDEX: 33 KG/M2 | HEIGHT: 61 IN | DIASTOLIC BLOOD PRESSURE: 84 MMHG

## 2022-05-31 DIAGNOSIS — Z13.220 SCREENING FOR HYPERLIPIDEMIA: ICD-10-CM

## 2022-05-31 DIAGNOSIS — I10 BENIGN ESSENTIAL HTN: Primary | ICD-10-CM

## 2022-05-31 DIAGNOSIS — F11.20 CONTINUOUS OPIOID DEPENDENCE (HCC): ICD-10-CM

## 2022-05-31 DIAGNOSIS — Z11.4 SCREENING FOR HIV (HUMAN IMMUNODEFICIENCY VIRUS): ICD-10-CM

## 2022-05-31 DIAGNOSIS — R79.89 LOW VITAMIN D LEVEL: ICD-10-CM

## 2022-05-31 DIAGNOSIS — R60.9 PERIPHERAL EDEMA: ICD-10-CM

## 2022-05-31 DIAGNOSIS — Z13.29 SCREENING FOR THYROID DISORDER: ICD-10-CM

## 2022-05-31 DIAGNOSIS — Z13.0 SCREENING FOR IRON DEFICIENCY ANEMIA: ICD-10-CM

## 2022-05-31 PROCEDURE — 99213 OFFICE O/P EST LOW 20 MIN: CPT | Performed by: STUDENT IN AN ORGANIZED HEALTH CARE EDUCATION/TRAINING PROGRAM

## 2022-05-31 PROCEDURE — 3008F BODY MASS INDEX DOCD: CPT | Performed by: PHYSICIAN ASSISTANT

## 2022-05-31 RX ORDER — DEXTROAMPHETAMINE SACCHARATE, AMPHETAMINE ASPARTATE, DEXTROAMPHETAMINE SULFATE AND AMPHETAMINE SULFATE 5; 5; 5; 5 MG/1; MG/1; MG/1; MG/1
1 TABLET ORAL 2 TIMES DAILY PRN
COMMUNITY
Start: 2022-05-07

## 2022-05-31 RX ORDER — TERBINAFINE HYDROCHLORIDE 250 MG/1
250 TABLET ORAL DAILY
COMMUNITY
Start: 2022-05-25

## 2022-05-31 RX ORDER — FUROSEMIDE 20 MG/1
20 TABLET ORAL DAILY
Qty: 30 TABLET | Refills: 0 | Status: SHIPPED | OUTPATIENT
Start: 2022-05-31 | End: 2022-06-22

## 2022-05-31 NOTE — PROGRESS NOTES
[address] [facility],     Assessment/Plan:      Diagnosis ICD-10-CM Associated Orders   1  Benign essential HTN  I10 Echo complete w/ contrast if indicated     Comprehensive metabolic panel     furosemide (LASIX) 20 mg tablet   2  Peripheral edema  R60 9 Echo complete w/ contrast if indicated     furosemide (LASIX) 20 mg tablet   3  Screening for iron deficiency anemia  Z13 0 CBC and differential   4  Screening for thyroid disorder  Z13 29 TSH, 3rd generation with Free T4 reflex   5  Screening for hyperlipidemia  Z13 220 Lipid Panel with Direct LDL reflex   6  Screening for HIV (human immunodeficiency virus)  Z11 4 Human Immunodeficiency Virus 1/2 Antigen / Antibody ( Fourth Generation) with Reflex Testing   7  Low vitamin D level  R79 89 Vitamin D 25 hydroxy   8  Continuous opioid dependence ([facility] [address] )  F11 20       Unknown etiology of acutely worsening ankle swelling  Likely benign and secondary to increased walking frequency, heat and different foods while on vacation  Will have echo performed and reach out to patient with results  Lasix 20 mg sent to her pharmacy for 5 days  Side effects reviewed   Above screening and diagnostic blood work ordered  Patient did have a completed between now and her annual   Return in about 4 weeks (around 6/28/2022) for Annual Physical    Patient may call or return to office with any questions or concerns  _____________________________________________________________________  Subjective:     Patient ID: Aileen Duggan is a 45 y o  female  HPI  Aileen Duggan is a 50-year-old female here today for complaint about swollen ankles & some pain  Noting pain when waking up  Most noticeable midfoot to ankle  Pressure always present  No recent med changes  Has been on procardia for a while, some occasional flares of ankle swelling  On maxzide for 3 yrs now also  Has had EKG's, no ECHO's  Just traveled to [place] 5/8/22, tons of walking & hot weather  Visit in April had increased BP, improved on recheck  140/90  The following portions of the patient's history were reviewed and updated as appropriate: allergies, current medications, past medical history and problem list     Review of Systems      Objective:      Vitals:    05/31/22 1256   BP: 130/84   Pulse: 84   SpO2: 97%      Physical Exam      Portions of the record may have been created with voice recognition software  Occasional wrong word or "sound alike" substitutions may have occurred due to the inherent limitations of voice recognition software  Please review the chart carefully and recognize, using context, where substitutions/typographical errors may have occurred

## 2022-05-31 NOTE — PATIENT INSTRUCTIONS
Take lasix 20 mg one tab in am 5 days, check weight, check BP's occasionally, monitor swelling  Message or call office with update

## 2022-06-10 ENCOUNTER — HOSPITAL ENCOUNTER (OUTPATIENT)
Dept: NON INVASIVE DIAGNOSTICS | Facility: HOSPITAL | Age: 52
Discharge: HOME | End: 2022-06-10
Attending: STUDENT IN AN ORGANIZED HEALTH CARE EDUCATION/TRAINING PROGRAM
Payer: COMMERCIAL

## 2022-06-10 VITALS
SYSTOLIC BLOOD PRESSURE: 130 MMHG | WEIGHT: 174 LBS | DIASTOLIC BLOOD PRESSURE: 84 MMHG | BODY MASS INDEX: 32.85 KG/M2 | HEIGHT: 61 IN

## 2022-06-10 DIAGNOSIS — R60.9 PERIPHERAL EDEMA: ICD-10-CM

## 2022-06-10 DIAGNOSIS — I10 BENIGN ESSENTIAL HTN: ICD-10-CM

## 2022-06-10 LAB
AORTIC ROOT: 3 CM
APICAL FOUR CHAMBER EJECTION FRACTION: 55 %
E WAVE DECELERATION TIME: 147 MS
FRACTIONAL SHORTENING: 30 % (ref 28–44)
INTERVENTRICULAR SEPTUM IN DIASTOLE (PARASTERNAL SHORT AXIS VIEW): 0.8 CM
INTERVENTRICULAR SEPTUM: 0.8 CM (ref 0.6–1.1)
LAAS-AP2: 12 CM2
LAAS-AP4: 11.9 CM2
LEFT ATRIUM AREA SYSTOLE SINGLE PLANE A4C: 10.8 CM2
LEFT ATRIUM SIZE: 3.1 CM
LEFT INTERNAL DIMENSION IN SYSTOLE: 2.3 CM (ref 2.1–4)
LEFT VENTRICLE DIASTOLIC VOLUME (MOD BIPLANE): 94 ML
LEFT VENTRICLE SYSTOLIC VOLUME (MOD BIPLANE): 38 ML
LEFT VENTRICULAR INTERNAL DIMENSION IN DIASTOLE: 3.3 CM (ref 3.5–6)
LEFT VENTRICULAR POSTERIOR WALL IN END DIASTOLE: 0.9 CM
LEFT VENTRICULAR STROKE VOLUME: 25 ML
LV EF: 59 %
LVSV (TEICH): 25 ML
MV E'TISSUE VEL-SEP: 8 CM/S
MV PEAK A VEL: 1.09 M/S
MV PEAK E VEL: 47 CM/S
MV STENOSIS PRESSURE HALF TIME: 43 MS
MV VALVE AREA P 1/2 METHOD: 5.12 CM2
RIGHT ATRIUM AREA SYSTOLE A4C: 8.5 CM2
RIGHT VENTRICLE ID DIMENSION: 2.2 CM
SL CV LEFT ATRIUM LENGTH A2C: 3.8 CM
SL CV LV EF: 65
SL CV PED ECHO LEFT VENTRICLE DIASTOLIC VOLUME (MOD BIPLANE) 2D: 43 ML
SL CV PED ECHO LEFT VENTRICLE SYSTOLIC VOLUME (MOD BIPLANE) 2D: 18 ML

## 2022-06-10 PROCEDURE — 93306 TTE W/DOPPLER COMPLETE: CPT

## 2022-06-10 PROCEDURE — 93306 TTE W/DOPPLER COMPLETE: CPT | Performed by: INTERNAL MEDICINE

## 2022-06-14 DIAGNOSIS — F41.9 ANXIETY: ICD-10-CM

## 2022-06-14 DIAGNOSIS — F32.A DEPRESSION, UNSPECIFIED DEPRESSION TYPE: ICD-10-CM

## 2022-06-14 DIAGNOSIS — G47.00 INSOMNIA, UNSPECIFIED TYPE: ICD-10-CM

## 2022-06-15 RX ORDER — CLONAZEPAM 0.5 MG/1
0.5 TABLET ORAL 2 TIMES DAILY PRN
Qty: 30 TABLET | Refills: 0 | Status: SHIPPED | OUTPATIENT
Start: 2022-06-15 | End: 2022-06-30 | Stop reason: SDUPTHER

## 2022-06-22 DIAGNOSIS — I10 BENIGN ESSENTIAL HTN: ICD-10-CM

## 2022-06-22 DIAGNOSIS — R60.9 PERIPHERAL EDEMA: ICD-10-CM

## 2022-06-22 RX ORDER — FUROSEMIDE 20 MG/1
TABLET ORAL
Qty: 90 TABLET | Refills: 1 | Status: SHIPPED | OUTPATIENT
Start: 2022-06-22

## 2022-06-30 DIAGNOSIS — G47.00 INSOMNIA, UNSPECIFIED TYPE: ICD-10-CM

## 2022-06-30 DIAGNOSIS — F32.A DEPRESSION, UNSPECIFIED DEPRESSION TYPE: ICD-10-CM

## 2022-06-30 DIAGNOSIS — F41.9 ANXIETY: ICD-10-CM

## 2022-07-01 RX ORDER — CLONAZEPAM 0.5 MG/1
0.5 TABLET ORAL 2 TIMES DAILY PRN
Qty: 30 TABLET | Refills: 0 | Status: SHIPPED | OUTPATIENT
Start: 2022-07-01

## 2022-07-01 RX ORDER — ZOLPIDEM TARTRATE 12.5 MG/1
12.5 TABLET, FILM COATED, EXTENDED RELEASE ORAL
Qty: 30 TABLET | Refills: 0 | Status: SHIPPED | OUTPATIENT
Start: 2022-07-01 | End: 2022-07-06 | Stop reason: SDUPTHER

## 2022-07-06 DIAGNOSIS — G47.00 INSOMNIA, UNSPECIFIED TYPE: ICD-10-CM

## 2022-07-06 RX ORDER — ZOLPIDEM TARTRATE 12.5 MG/1
12.5 TABLET, FILM COATED, EXTENDED RELEASE ORAL
Qty: 30 TABLET | Refills: 0 | Status: SHIPPED | OUTPATIENT
Start: 2022-07-06 | End: 2022-08-01 | Stop reason: SDUPTHER

## 2022-08-01 DIAGNOSIS — G47.00 INSOMNIA, UNSPECIFIED TYPE: ICD-10-CM

## 2022-08-01 DIAGNOSIS — F98.8 ATTENTION DEFICIT DISORDER, UNSPECIFIED HYPERACTIVITY PRESENCE: ICD-10-CM

## 2022-08-02 RX ORDER — ZOLPIDEM TARTRATE 12.5 MG/1
12.5 TABLET, FILM COATED, EXTENDED RELEASE ORAL
Qty: 30 TABLET | Refills: 0 | Status: SHIPPED | OUTPATIENT
Start: 2022-08-02 | End: 2022-08-26 | Stop reason: SDUPTHER

## 2022-08-26 DIAGNOSIS — G47.00 INSOMNIA, UNSPECIFIED TYPE: ICD-10-CM

## 2022-08-26 DIAGNOSIS — F98.8 ATTENTION DEFICIT DISORDER, UNSPECIFIED HYPERACTIVITY PRESENCE: ICD-10-CM

## 2022-08-29 RX ORDER — ZOLPIDEM TARTRATE 12.5 MG/1
12.5 TABLET, FILM COATED, EXTENDED RELEASE ORAL
Qty: 30 TABLET | Refills: 0 | Status: SHIPPED | OUTPATIENT
Start: 2022-08-29 | End: 2022-10-05 | Stop reason: SDUPTHER

## 2022-09-17 DIAGNOSIS — I15.9 SECONDARY HYPERTENSION: ICD-10-CM

## 2022-09-19 RX ORDER — NIFEDIPINE 60 MG/1
TABLET, EXTENDED RELEASE ORAL
Qty: 90 TABLET | Refills: 1 | Status: SHIPPED | OUTPATIENT
Start: 2022-09-19

## 2022-10-05 DIAGNOSIS — G47.00 INSOMNIA, UNSPECIFIED TYPE: ICD-10-CM

## 2022-10-05 DIAGNOSIS — F98.8 ATTENTION DEFICIT DISORDER, UNSPECIFIED HYPERACTIVITY PRESENCE: ICD-10-CM

## 2022-10-05 RX ORDER — ZOLPIDEM TARTRATE 12.5 MG/1
12.5 TABLET, FILM COATED, EXTENDED RELEASE ORAL
Qty: 30 TABLET | Refills: 0 | Status: SHIPPED | OUTPATIENT
Start: 2022-10-05 | End: 2022-10-06 | Stop reason: SDUPTHER

## 2022-10-06 DIAGNOSIS — F98.8 ATTENTION DEFICIT DISORDER, UNSPECIFIED HYPERACTIVITY PRESENCE: ICD-10-CM

## 2022-10-06 DIAGNOSIS — G47.00 INSOMNIA, UNSPECIFIED TYPE: ICD-10-CM

## 2022-10-07 RX ORDER — ZOLPIDEM TARTRATE 12.5 MG/1
12.5 TABLET, FILM COATED, EXTENDED RELEASE ORAL
Qty: 30 TABLET | Refills: 0 | Status: SHIPPED | OUTPATIENT
Start: 2022-10-07

## 2022-10-12 PROBLEM — B96.89 SINUSITIS, BACTERIAL: Status: RESOLVED | Noted: 2017-06-23 | Resolved: 2022-10-12

## 2022-10-12 PROBLEM — J32.9 SINUSITIS, BACTERIAL: Status: RESOLVED | Noted: 2017-06-23 | Resolved: 2022-10-12

## 2022-10-14 ENCOUNTER — TELEPHONE (OUTPATIENT)
Dept: FAMILY MEDICINE CLINIC | Facility: HOSPITAL | Age: 52
End: 2022-10-14

## 2022-10-14 NOTE — TELEPHONE ENCOUNTER
lisdexamfetamine (VYVANSE) 50 MG capsule    Will the patient still taking this? And if so she will send over a new PA  Current PA expiring end of this month

## 2022-10-17 NOTE — TELEPHONE ENCOUNTER
PT will continue her Vyvanse - next refill is  - her prior Lynn Gregorio - did  at the end of Sept   - will start another Prior auth for PT

## 2022-11-04 DIAGNOSIS — G47.00 INSOMNIA, UNSPECIFIED TYPE: ICD-10-CM

## 2022-11-04 DIAGNOSIS — F98.8 ATTENTION DEFICIT DISORDER, UNSPECIFIED HYPERACTIVITY PRESENCE: ICD-10-CM

## 2022-11-04 RX ORDER — ZOLPIDEM TARTRATE 12.5 MG/1
12.5 TABLET, FILM COATED, EXTENDED RELEASE ORAL
Qty: 30 TABLET | Refills: 0 | Status: SHIPPED | OUTPATIENT
Start: 2022-11-04

## 2022-12-02 DIAGNOSIS — F98.8 ATTENTION DEFICIT DISORDER, UNSPECIFIED HYPERACTIVITY PRESENCE: ICD-10-CM

## 2022-12-02 DIAGNOSIS — G47.00 INSOMNIA, UNSPECIFIED TYPE: ICD-10-CM

## 2022-12-02 RX ORDER — ZOLPIDEM TARTRATE 12.5 MG/1
12.5 TABLET, FILM COATED, EXTENDED RELEASE ORAL
Qty: 30 TABLET | Refills: 0 | Status: SHIPPED | OUTPATIENT
Start: 2022-12-02

## 2022-12-07 ENCOUNTER — OFFICE VISIT (OUTPATIENT)
Dept: FAMILY MEDICINE CLINIC | Facility: HOSPITAL | Age: 52
End: 2022-12-07

## 2022-12-07 VITALS
HEART RATE: 98 BPM | OXYGEN SATURATION: 97 % | SYSTOLIC BLOOD PRESSURE: 132 MMHG | HEIGHT: 61 IN | BODY MASS INDEX: 33.61 KG/M2 | WEIGHT: 178 LBS | DIASTOLIC BLOOD PRESSURE: 84 MMHG

## 2022-12-07 DIAGNOSIS — G47.00 INSOMNIA, UNSPECIFIED TYPE: ICD-10-CM

## 2022-12-07 DIAGNOSIS — R60.9 PERIPHERAL EDEMA: Primary | ICD-10-CM

## 2022-12-07 DIAGNOSIS — I10 BENIGN ESSENTIAL HTN: ICD-10-CM

## 2022-12-07 DIAGNOSIS — F32.A DEPRESSION, UNSPECIFIED DEPRESSION TYPE: ICD-10-CM

## 2022-12-07 DIAGNOSIS — F98.8 ATTENTION DEFICIT DISORDER, UNSPECIFIED HYPERACTIVITY PRESENCE: ICD-10-CM

## 2022-12-07 DIAGNOSIS — F41.9 ANXIETY: ICD-10-CM

## 2022-12-07 RX ORDER — CLONAZEPAM 0.5 MG/1
0.5 TABLET ORAL 2 TIMES DAILY PRN
Qty: 30 TABLET | Refills: 0 | Status: SHIPPED | OUTPATIENT
Start: 2022-12-07

## 2022-12-07 RX ORDER — NALOXONE HYDROCHLORIDE 4 MG/.1ML
SPRAY NASAL
Qty: 1 EACH | Refills: 1 | Status: SHIPPED | OUTPATIENT
Start: 2022-12-07

## 2022-12-07 RX ORDER — QUETIAPINE FUMARATE 50 MG/1
TABLET, FILM COATED ORAL
COMMUNITY
Start: 2022-12-01

## 2022-12-07 RX ORDER — BUPROPION HYDROCHLORIDE 300 MG/1
300 TABLET ORAL EVERY MORNING
Qty: 90 TABLET | Refills: 1 | Status: SHIPPED | OUTPATIENT
Start: 2022-12-07

## 2022-12-07 NOTE — PROGRESS NOTES
[facility],     Assessment/Plan:      Diagnosis ICD-10-CM Associated Orders   1  Peripheral edema  R60 9       2  Depression, unspecified depression type  F32  A clonazePAM (KlonoPIN) 0 5 mg tablet     buPROPion (WELLBUTRIN XL) 300 mg 24 hr tablet      3  Insomnia, unspecified type  G47 00 clonazePAM (KlonoPIN) 0 5 mg tablet      4  Anxiety  F41 9 clonazePAM (KlonoPIN) 0 5 mg tablet     naloxone (NARCAN) 4 mg/0 1 mL nasal spray      5  Attention deficit disorder, unspecified hyperactivity presence  F98 8       6  Benign essential HTN  I10         Keep en eye on 2-3x/wk BP's  Call if > 145/90 consistently  May need to resume maxzide  Hold lasix for now  • Complete May labs  • Refilled meds, start wellbutrin 300 mg  Return in about 3 months (around 3/7/2023) for Annual Physical   • Patient may call or return to office with any questions or concerns  ______________________________________________________________________  Subjective:     Patient ID: Alexa Call is a 47 y o  female  HPI  Joanie Dobbins  Chief Complaint   Patient presents with   • Medication Management     Wellbutrin may not be helping  Seeing therapist 90 mins, bi weekly, intensive trauma therapy  Katina Jacob  Deep discussion, exhausted  Wellbutrin greater than >1 yr of use  Hard to get motivated  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  Home BP's 128/86 more nml  Has not yet done BW  Wt Readings from Last 3 Encounters:   12/07/22 80 7 kg (178 lb)   06/10/22 78 9 kg (174 lb)   05/31/22 79 3 kg (174 lb 12 8 oz)     BMI Counseling: Body mass index is 33 63 kg/m²  The BMI is above normal  Nutrition recommendations include decreasing portion sizes and encouraging healthy choices of fruits and vegetables  Exercise recommendations include exercising 3-5 times per week  Rationale for BMI follow-up plan is due to patient being overweight or obese         The following portions of the patient's history were reviewed and updated as appropriate: allergies, current medications, past medical history, and problem list     Review of Systems   Constitutional: Negative for chills and fever  Eyes: Negative for photophobia and pain  Respiratory: Negative for cough and shortness of breath  Cardiovascular: Negative for chest pain and palpitations  Psychiatric/Behavioral: Positive for dysphoric mood  Negative for sleep disturbance  The patient is nervous/anxious  Objective:      Vitals:    12/07/22 1433   BP: 150/100   Pulse: 98   SpO2: 97%      Physical Exam  Vitals reviewed  Constitutional:       General: She is not in acute distress  Appearance: Normal appearance  She is well-developed  She is obese  She is not ill-appearing  HENT:      Head: Normocephalic and atraumatic  Eyes:      General: No scleral icterus  Right eye: No discharge  Left eye: No discharge  Cardiovascular:      Rate and Rhythm: Normal rate and regular rhythm  Pulses: Normal pulses  Heart sounds: Normal heart sounds  No murmur heard  Pulmonary:      Effort: Pulmonary effort is normal  No respiratory distress  Breath sounds: Normal breath sounds  No stridor  No wheezing  Musculoskeletal:      Cervical back: Normal range of motion  Right lower leg: No edema  Left lower leg: No edema  Skin:     General: Skin is warm  Neurological:      Mental Status: She is alert and oriented to person, place, and time  Psychiatric:         Mood and Affect: Mood normal          Behavior: Behavior normal          Thought Content: Thought content normal          Judgment: Judgment normal            Portions of the record may have been created with voice recognition software  Occasional wrong word or "sound alike" substitutions may have occurred due to the inherent limitations of voice recognition software   Please review the chart carefully and recognize, using context, where substitutions/typographical errors may have occurred

## 2022-12-07 NOTE — PATIENT INSTRUCTIONS
Keep en eye on 2-3x/wk BP's  Call if > 145/90 consistently  May need to resume maxzide  Hold lasix for now

## 2023-01-02 ENCOUNTER — PATIENT MESSAGE (OUTPATIENT)
Dept: FAMILY MEDICINE CLINIC | Facility: HOSPITAL | Age: 53
End: 2023-01-02

## 2023-03-07 DIAGNOSIS — G47.00 INSOMNIA, UNSPECIFIED TYPE: ICD-10-CM

## 2023-03-07 DIAGNOSIS — F41.9 ANXIETY: ICD-10-CM

## 2023-03-07 DIAGNOSIS — I15.9 SECONDARY HYPERTENSION: ICD-10-CM

## 2023-03-07 DIAGNOSIS — F32.A DEPRESSION, UNSPECIFIED DEPRESSION TYPE: ICD-10-CM

## 2023-03-07 DIAGNOSIS — F98.8 ATTENTION DEFICIT DISORDER, UNSPECIFIED HYPERACTIVITY PRESENCE: ICD-10-CM

## 2023-03-08 RX ORDER — ZOLPIDEM TARTRATE 12.5 MG/1
12.5 TABLET, FILM COATED, EXTENDED RELEASE ORAL
Qty: 90 TABLET | Refills: 0 | Status: SHIPPED | OUTPATIENT
Start: 2023-03-08 | End: 2023-06-06

## 2023-03-08 RX ORDER — CLONAZEPAM 0.5 MG/1
0.5 TABLET ORAL 2 TIMES DAILY PRN
Qty: 30 TABLET | Refills: 0 | Status: SHIPPED | OUTPATIENT
Start: 2023-03-08

## 2023-03-08 RX ORDER — NIFEDIPINE 60 MG/1
60 TABLET, EXTENDED RELEASE ORAL DAILY
Qty: 90 TABLET | Refills: 1 | Status: SHIPPED | OUTPATIENT
Start: 2023-03-08

## 2023-03-15 ENCOUNTER — TELEPHONE (OUTPATIENT)
Dept: FAMILY MEDICINE CLINIC | Facility: HOSPITAL | Age: 53
End: 2023-03-15

## 2023-03-15 NOTE — TELEPHONE ENCOUNTER
LM on med refill line, Reports adderall is out of stock, spoke to pharmascist and they recommend Vyvnase which they have in stock  Please advise, pt would like a call back    Thank you

## 2023-03-16 ENCOUNTER — TELEPHONE (OUTPATIENT)
Dept: ADMINISTRATIVE | Facility: OTHER | Age: 53
End: 2023-03-16

## 2023-03-16 NOTE — TELEPHONE ENCOUNTER
Upon review of the In Basket request we were able to locate, review, and update the patient chart as requested for Hepatitis C   Any additional questions or concerns should be emailed to the Practice Liaisons via the appropriate education email address, please do not reply via In Basket      Thank you  Carola Ulloa MA

## 2023-03-16 NOTE — TELEPHONE ENCOUNTER
----- Message from Joyce Martinez DO sent at 3/15/2023  8:49 PM EDT -----  03/15/23 8:49 PM    Michael, our patient Tiesha Valentino has had Hepatitis C completed/performed  Please assist in updating the patient chart by pulling the Care Everywhere (CE) document  The date of service is 1/26/18  - [place] Hepatitis panel       Thank you,  Joyce Martinez DO  [facility]

## 2023-03-21 DIAGNOSIS — F32.A DEPRESSION, UNSPECIFIED DEPRESSION TYPE: ICD-10-CM

## 2023-03-21 DIAGNOSIS — I15.9 SECONDARY HYPERTENSION: ICD-10-CM

## 2023-03-21 RX ORDER — NIFEDIPINE 60 MG/1
60 TABLET, EXTENDED RELEASE ORAL DAILY
Qty: 90 TABLET | Refills: 0 | Status: SHIPPED | OUTPATIENT
Start: 2023-03-21

## 2023-03-21 RX ORDER — BUPROPION HYDROCHLORIDE 300 MG/1
300 TABLET ORAL EVERY MORNING
Qty: 90 TABLET | Refills: 0 | Status: SHIPPED | OUTPATIENT
Start: 2023-03-21

## 2023-04-07 DIAGNOSIS — G47.00 INSOMNIA, UNSPECIFIED TYPE: ICD-10-CM

## 2023-04-07 RX ORDER — ZOLPIDEM TARTRATE 12.5 MG/1
12.5 TABLET, FILM COATED, EXTENDED RELEASE ORAL
Qty: 90 TABLET | Refills: 0 | Status: SHIPPED | OUTPATIENT
Start: 2023-04-07 | End: 2023-07-06

## 2023-05-14 DIAGNOSIS — F98.8 ATTENTION DEFICIT DISORDER, UNSPECIFIED HYPERACTIVITY PRESENCE: ICD-10-CM

## 2023-05-14 DIAGNOSIS — I10 BENIGN ESSENTIAL HTN: ICD-10-CM

## 2023-05-14 DIAGNOSIS — F32.A DEPRESSION, UNSPECIFIED DEPRESSION TYPE: ICD-10-CM

## 2023-05-14 DIAGNOSIS — G47.00 INSOMNIA, UNSPECIFIED TYPE: ICD-10-CM

## 2023-05-14 DIAGNOSIS — F41.9 ANXIETY: ICD-10-CM

## 2023-05-14 DIAGNOSIS — R60.9 PERIPHERAL EDEMA: ICD-10-CM

## 2023-05-15 RX ORDER — CLONAZEPAM 0.5 MG/1
0.5 TABLET ORAL 2 TIMES DAILY PRN
Qty: 30 TABLET | Refills: 0 | Status: SHIPPED | OUTPATIENT
Start: 2023-05-15

## 2023-05-15 RX ORDER — FUROSEMIDE 20 MG/1
20 TABLET ORAL DAILY
Qty: 90 TABLET | Refills: 0 | Status: SHIPPED | OUTPATIENT
Start: 2023-05-15

## 2023-06-01 DIAGNOSIS — G47.00 INSOMNIA, UNSPECIFIED TYPE: ICD-10-CM

## 2023-06-01 DIAGNOSIS — F48.9 MENTAL HEALTH PROBLEM: Primary | ICD-10-CM

## 2023-06-01 RX ORDER — QUETIAPINE FUMARATE 50 MG/1
50 TABLET, FILM COATED ORAL
Qty: 90 TABLET | Refills: 0 | Status: SHIPPED | OUTPATIENT
Start: 2023-06-01

## 2023-06-06 RX ORDER — ZOLPIDEM TARTRATE 12.5 MG/1
TABLET, FILM COATED, EXTENDED RELEASE ORAL
Qty: 90 TABLET | Refills: 0 | Status: SHIPPED | OUTPATIENT
Start: 2023-06-06

## 2023-06-07 ENCOUNTER — TELEPHONE (OUTPATIENT)
Dept: FAMILY MEDICINE CLINIC | Facility: HOSPITAL | Age: 53
End: 2023-06-07

## 2023-06-17 DIAGNOSIS — F41.9 ANXIETY: ICD-10-CM

## 2023-06-17 DIAGNOSIS — I15.9 SECONDARY HYPERTENSION: ICD-10-CM

## 2023-06-17 DIAGNOSIS — G47.00 INSOMNIA, UNSPECIFIED TYPE: ICD-10-CM

## 2023-06-17 DIAGNOSIS — F32.A DEPRESSION, UNSPECIFIED DEPRESSION TYPE: ICD-10-CM

## 2023-06-19 DIAGNOSIS — I15.9 SECONDARY HYPERTENSION: ICD-10-CM

## 2023-06-19 DIAGNOSIS — I10 BENIGN ESSENTIAL HTN: ICD-10-CM

## 2023-06-19 DIAGNOSIS — R60.9 PERIPHERAL EDEMA: ICD-10-CM

## 2023-06-19 RX ORDER — NIFEDIPINE 60 MG/1
TABLET, EXTENDED RELEASE ORAL
Qty: 90 TABLET | Refills: 0 | Status: SHIPPED | OUTPATIENT
Start: 2023-06-19

## 2023-06-19 RX ORDER — CLONAZEPAM 0.5 MG/1
0.5 TABLET ORAL 2 TIMES DAILY PRN
Qty: 30 TABLET | Refills: 0 | Status: SHIPPED | OUTPATIENT
Start: 2023-06-19

## 2023-06-19 NOTE — TELEPHONE ENCOUNTER
PATIENT LOST BOTTLES OF THESE PILLS     CAN WE PLEASE SEND NEW SCRIPTS TO PHARMACY?     PATIENT AWARE IT MAY NOT BE COVERED WITH INSURANCE

## 2023-06-20 RX ORDER — TRIAMTERENE AND HYDROCHLOROTHIAZIDE 37.5; 25 MG/1; MG/1
1 TABLET ORAL DAILY
Qty: 90 TABLET | Refills: 1 | Status: SHIPPED | OUTPATIENT
Start: 2023-06-20

## 2023-06-20 RX ORDER — FUROSEMIDE 20 MG/1
20 TABLET ORAL DAILY PRN
Qty: 30 TABLET | Refills: 0 | Status: SHIPPED | OUTPATIENT
Start: 2023-06-20

## 2023-06-21 ENCOUNTER — TELEPHONE (OUTPATIENT)
Dept: FAMILY MEDICINE CLINIC | Facility: HOSPITAL | Age: 53
End: 2023-06-21

## 2023-06-21 DIAGNOSIS — R60.9 PERIPHERAL EDEMA: ICD-10-CM

## 2023-06-21 DIAGNOSIS — Z13.0 SCREENING FOR IRON DEFICIENCY ANEMIA: ICD-10-CM

## 2023-06-21 DIAGNOSIS — Z11.4 SCREENING FOR HIV (HUMAN IMMUNODEFICIENCY VIRUS): ICD-10-CM

## 2023-06-21 DIAGNOSIS — Z13.29 SCREENING FOR THYROID DISORDER: ICD-10-CM

## 2023-06-21 DIAGNOSIS — Z13.220 SCREENING FOR HYPERLIPIDEMIA: ICD-10-CM

## 2023-06-21 DIAGNOSIS — I10 BENIGN ESSENTIAL HTN: Primary | ICD-10-CM

## 2023-06-21 NOTE — TELEPHONE ENCOUNTER
Left detailed message for patient to call back to schedule appointment and to let me know which lab she uses so we can order the blood work for her

## 2023-06-21 NOTE — TELEPHONE ENCOUNTER
----- Message from Regina Mosher DO sent at 6/20/2023  2:46 PM EDT -----  She needs a visit scheduled for her annual -   She is overdue for labs too, it's been two yrs, and she wants two water pills that spill potassium  We have no idea what her potassium is lately  Pls order HIV, CBC, CMP, Lipid, TSH rfx T4 and have her get them done as soon as she is able to, fasting

## 2023-07-14 LAB
ALBUMIN SERPL-MCNC: 4.4 G/DL (ref 3.8–4.9)
ALBUMIN/GLOB SERPL: 2.6 {RATIO} (ref 1.2–2.2)
ALP SERPL-CCNC: 122 IU/L (ref 44–121)
ALT SERPL-CCNC: 11 IU/L (ref 0–32)
AST SERPL-CCNC: 28 IU/L (ref 0–40)
BASOPHILS # BLD AUTO: 0.1 X10E3/UL (ref 0–0.2)
BASOPHILS NFR BLD AUTO: 1 %
BILIRUB SERPL-MCNC: 0.4 MG/DL (ref 0–1.2)
BUN SERPL-MCNC: 12 MG/DL (ref 6–24)
BUN/CREAT SERPL: 12 (ref 9–23)
CALCIUM SERPL-MCNC: 9.1 MG/DL (ref 8.7–10.2)
CHLORIDE SERPL-SCNC: 95 MMOL/L (ref 96–106)
CHOLEST SERPL-MCNC: 179 MG/DL (ref 100–199)
CHOLEST/HDLC SERPL: 2.7 RATIO (ref 0–4.4)
CO2 SERPL-SCNC: 24 MMOL/L (ref 20–29)
CREAT SERPL-MCNC: 1 MG/DL (ref 0.57–1)
EGFR: 67 ML/MIN/1.73
EOSINOPHIL # BLD AUTO: 0.2 X10E3/UL (ref 0–0.4)
EOSINOPHIL NFR BLD AUTO: 3 %
ERYTHROCYTE [DISTWIDTH] IN BLOOD BY AUTOMATED COUNT: 12.4 % (ref 11.7–15.4)
GLOBULIN SER-MCNC: 1.7 G/DL (ref 1.5–4.5)
GLUCOSE SERPL-MCNC: 96 MG/DL (ref 70–99)
HCT VFR BLD AUTO: 43.3 % (ref 34–46.6)
HDLC SERPL-MCNC: 66 MG/DL
HGB BLD-MCNC: 14.3 G/DL (ref 11.1–15.9)
HIV 1+2 AB+HIV1 P24 AG SERPL QL IA: NON REACTIVE
IMM GRANULOCYTES # BLD: 0 X10E3/UL (ref 0–0.1)
IMM GRANULOCYTES NFR BLD: 0 %
LDLC SERPL CALC-MCNC: 100 MG/DL (ref 0–99)
LYMPHOCYTES # BLD AUTO: 2.4 X10E3/UL (ref 0.7–3.1)
LYMPHOCYTES NFR BLD AUTO: 32 %
MCH RBC QN AUTO: 29.1 PG (ref 26.6–33)
MCHC RBC AUTO-ENTMCNC: 33 G/DL (ref 31.5–35.7)
MCV RBC AUTO: 88 FL (ref 79–97)
MONOCYTES # BLD AUTO: 0.6 X10E3/UL (ref 0.1–0.9)
MONOCYTES NFR BLD AUTO: 7 %
NEUTROPHILS # BLD AUTO: 4.2 X10E3/UL (ref 1.4–7)
NEUTROPHILS NFR BLD AUTO: 57 %
PLATELET # BLD AUTO: 391 X10E3/UL (ref 150–450)
POTASSIUM SERPL-SCNC: 3.6 MMOL/L (ref 3.5–5.2)
PROT SERPL-MCNC: 6.1 G/DL (ref 6–8.5)
RBC # BLD AUTO: 4.91 X10E6/UL (ref 3.77–5.28)
SL AMB VLDL CHOLESTEROL CALC: 13 MG/DL (ref 5–40)
SODIUM SERPL-SCNC: 137 MMOL/L (ref 134–144)
TRIGL SERPL-MCNC: 72 MG/DL (ref 0–149)
TSH SERPL DL<=0.005 MIU/L-ACNC: 2.44 UIU/ML (ref 0.45–4.5)
WBC # BLD AUTO: 7.5 X10E3/UL (ref 3.4–10.8)

## 2023-08-01 DIAGNOSIS — F98.8 ATTENTION DEFICIT DISORDER, UNSPECIFIED HYPERACTIVITY PRESENCE: ICD-10-CM

## 2023-08-01 DIAGNOSIS — I15.9 SECONDARY HYPERTENSION: ICD-10-CM

## 2023-08-01 DIAGNOSIS — F32.A DEPRESSION, UNSPECIFIED DEPRESSION TYPE: ICD-10-CM

## 2023-08-01 DIAGNOSIS — F48.9 MENTAL HEALTH PROBLEM: ICD-10-CM

## 2023-08-01 DIAGNOSIS — G47.00 INSOMNIA, UNSPECIFIED TYPE: ICD-10-CM

## 2023-08-01 DIAGNOSIS — F41.9 ANXIETY: ICD-10-CM

## 2023-08-03 RX ORDER — QUETIAPINE FUMARATE 50 MG/1
50 TABLET, FILM COATED ORAL
Qty: 90 TABLET | Refills: 0 | Status: SHIPPED | OUTPATIENT
Start: 2023-08-03

## 2023-08-03 RX ORDER — NIFEDIPINE 60 MG/1
60 TABLET, EXTENDED RELEASE ORAL DAILY
Qty: 90 TABLET | Refills: 0 | Status: SHIPPED | OUTPATIENT
Start: 2023-08-03

## 2023-08-03 RX ORDER — ZOLPIDEM TARTRATE 12.5 MG/1
12.5 TABLET, FILM COATED, EXTENDED RELEASE ORAL
Qty: 90 TABLET | Refills: 0 | Status: SHIPPED | OUTPATIENT
Start: 2023-08-03

## 2023-08-03 RX ORDER — TRIAMTERENE AND HYDROCHLOROTHIAZIDE 37.5; 25 MG/1; MG/1
1 TABLET ORAL DAILY
Qty: 90 TABLET | Refills: 0 | Status: SHIPPED | OUTPATIENT
Start: 2023-08-03

## 2023-08-03 RX ORDER — CLONAZEPAM 0.5 MG/1
0.5 TABLET ORAL 2 TIMES DAILY PRN
Qty: 30 TABLET | Refills: 0 | Status: SHIPPED | OUTPATIENT
Start: 2023-08-03

## 2023-08-04 DIAGNOSIS — R60.9 PERIPHERAL EDEMA: ICD-10-CM

## 2023-08-04 DIAGNOSIS — I10 BENIGN ESSENTIAL HTN: ICD-10-CM

## 2023-08-04 RX ORDER — FUROSEMIDE 20 MG/1
TABLET ORAL
Qty: 90 TABLET | Refills: 1 | Status: SHIPPED | OUTPATIENT
Start: 2023-08-04

## 2023-08-29 DIAGNOSIS — G47.00 INSOMNIA, UNSPECIFIED TYPE: ICD-10-CM

## 2023-08-29 DIAGNOSIS — F48.9 MENTAL HEALTH PROBLEM: ICD-10-CM

## 2023-08-29 DIAGNOSIS — F98.8 ATTENTION DEFICIT DISORDER, UNSPECIFIED HYPERACTIVITY PRESENCE: ICD-10-CM

## 2023-08-29 DIAGNOSIS — F32.A DEPRESSION, UNSPECIFIED DEPRESSION TYPE: ICD-10-CM

## 2023-08-29 DIAGNOSIS — F41.9 ANXIETY: ICD-10-CM

## 2023-08-31 RX ORDER — ZOLPIDEM TARTRATE 12.5 MG/1
12.5 TABLET, FILM COATED, EXTENDED RELEASE ORAL
Qty: 90 TABLET | Refills: 0 | Status: SHIPPED | OUTPATIENT
Start: 2023-08-31

## 2023-08-31 RX ORDER — CLONAZEPAM 0.5 MG/1
0.5 TABLET ORAL 2 TIMES DAILY PRN
Qty: 30 TABLET | Refills: 0 | Status: SHIPPED | OUTPATIENT
Start: 2023-08-31

## 2023-08-31 RX ORDER — QUETIAPINE FUMARATE 50 MG/1
50 TABLET, FILM COATED ORAL
Qty: 90 TABLET | Refills: 0 | Status: SHIPPED | OUTPATIENT
Start: 2023-08-31

## 2023-08-31 RX ORDER — BUPROPION HYDROCHLORIDE 300 MG/1
300 TABLET ORAL EVERY MORNING
Qty: 90 TABLET | Refills: 0 | Status: SHIPPED | OUTPATIENT
Start: 2023-08-31

## 2023-10-02 DIAGNOSIS — I15.9 SECONDARY HYPERTENSION: ICD-10-CM

## 2023-10-02 DIAGNOSIS — F41.9 ANXIETY: ICD-10-CM

## 2023-10-02 DIAGNOSIS — G47.00 INSOMNIA, UNSPECIFIED TYPE: ICD-10-CM

## 2023-10-02 DIAGNOSIS — F48.9 MENTAL HEALTH PROBLEM: ICD-10-CM

## 2023-10-02 DIAGNOSIS — F98.8 ATTENTION DEFICIT DISORDER, UNSPECIFIED HYPERACTIVITY PRESENCE: ICD-10-CM

## 2023-10-02 DIAGNOSIS — F32.A DEPRESSION, UNSPECIFIED DEPRESSION TYPE: ICD-10-CM

## 2023-10-02 RX ORDER — LISDEXAMFETAMINE DIMESYLATE CAPSULES 50 MG/1
50 CAPSULE ORAL EVERY MORNING
Qty: 90 CAPSULE | Refills: 0 | Status: CANCELLED | OUTPATIENT
Start: 2023-10-02 | End: 2023-12-31

## 2023-10-03 RX ORDER — CLONAZEPAM 0.5 MG/1
0.5 TABLET ORAL 2 TIMES DAILY PRN
Qty: 30 TABLET | Refills: 0 | Status: SHIPPED | OUTPATIENT
Start: 2023-10-03

## 2023-10-03 RX ORDER — NIFEDIPINE 60 MG/1
60 TABLET, EXTENDED RELEASE ORAL DAILY
Qty: 90 TABLET | Refills: 0 | Status: SHIPPED | OUTPATIENT
Start: 2023-10-03

## 2023-10-03 RX ORDER — LISDEXAMFETAMINE DIMESYLATE 50 MG
50 CAPSULE ORAL EVERY MORNING
Qty: 30 CAPSULE | Refills: 0 | Status: SHIPPED | OUTPATIENT
Start: 2023-10-03 | End: 2023-10-04

## 2023-10-03 RX ORDER — TRIAMTERENE AND HYDROCHLOROTHIAZIDE 37.5; 25 MG/1; MG/1
1 TABLET ORAL DAILY
Qty: 90 TABLET | Refills: 0 | Status: SHIPPED | OUTPATIENT
Start: 2023-10-03

## 2023-10-03 RX ORDER — QUETIAPINE FUMARATE 50 MG/1
50 TABLET, FILM COATED ORAL
Qty: 90 TABLET | Refills: 0 | Status: SHIPPED | OUTPATIENT
Start: 2023-10-03

## 2023-10-04 DIAGNOSIS — F98.8 ATTENTION DEFICIT DISORDER, UNSPECIFIED HYPERACTIVITY PRESENCE: Primary | ICD-10-CM

## 2023-10-04 RX ORDER — LISDEXAMFETAMINE DIMESYLATE CAPSULES 50 MG/1
50 CAPSULE ORAL EVERY MORNING
Qty: 30 CAPSULE | Refills: 0 | Status: SHIPPED | OUTPATIENT
Start: 2023-10-04

## 2023-10-08 ENCOUNTER — APPOINTMENT (OUTPATIENT)
Dept: RADIOLOGY | Facility: CLINIC | Age: 53
End: 2023-10-08
Payer: COMMERCIAL

## 2023-10-08 ENCOUNTER — OFFICE VISIT (OUTPATIENT)
Dept: URGENT CARE | Facility: CLINIC | Age: 53
End: 2023-10-08
Payer: COMMERCIAL

## 2023-10-08 VITALS
HEART RATE: 79 BPM | TEMPERATURE: 98.2 F | DIASTOLIC BLOOD PRESSURE: 80 MMHG | OXYGEN SATURATION: 98 % | RESPIRATION RATE: 18 BRPM | SYSTOLIC BLOOD PRESSURE: 116 MMHG

## 2023-10-08 DIAGNOSIS — M25.511 ACUTE PAIN OF RIGHT SHOULDER: Primary | ICD-10-CM

## 2023-10-08 DIAGNOSIS — M25.511 ACUTE PAIN OF RIGHT SHOULDER: ICD-10-CM

## 2023-10-08 DIAGNOSIS — M79.601 RIGHT ARM PAIN: ICD-10-CM

## 2023-10-08 PROCEDURE — 99213 OFFICE O/P EST LOW 20 MIN: CPT

## 2023-10-08 PROCEDURE — 73030 X-RAY EXAM OF SHOULDER: CPT

## 2023-10-08 PROCEDURE — 73060 X-RAY EXAM OF HUMERUS: CPT

## 2023-10-08 NOTE — PROGRESS NOTES
[facility]        NAME: Sabino Sheldon is a 46 y.o. female  : 1970    MRN: [id number]  DATE: 2023  TIME: 2:34 PM    Assessment and Plan   Acute pain of right shoulder [M25.511]  1. Acute pain of right shoulder  XR shoulder 2+ vw right      2. Right arm pain  XR humerus right          - Preliminary reading of R shoulder imaging show no acute osseous abnormality   - Ortho f/u     Patient Instructions   - Take Tylenol as needed  - Rest and apply ice  - Follow up wit orthopedics     Follow up with PCP in 3-5 days. Proceed to  ER if symptoms worsen. Chief Complaint     Chief Complaint   Patient presents with    Right Arm & Right Shoulder Pain     Pt reports she developed right upper arm pain and right shoulder pain this past week during yoga. History of Present Illness       47 y/o F presents for atraumatic R shoulder pain x 7 days. 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 No OTC tx. Pain located on anterior humeral head. Feels swollen. Review of Systems   Review of Systems   Musculoskeletal:  Positive for joint swelling. R shoulder pain         Current Medications       Current Outpatient Medications:     clonazePAM (KlonoPIN) 0.5 mg tablet, Take 1 tablet (0.5 mg total) by mouth 2 (two) times a day as needed for anxiety, Disp: 30 tablet, Rfl: 0    furosemide (LASIX) 20 mg tablet, TAKE 1 TABLET BY MOUTH DAILY AS NEEDED (EDEMA). , Disp: 90 tablet, Rfl: 1    lisdexamfetamine (VYVANSE) 50 MG capsule, Take 1 capsule (50 mg total) by mouth every morning Max Daily Amount: 50 mg, Disp: 30 capsule, Rfl: 0    Multiple Vitamins-Minerals (MULTIVITAL-M) TABS, Take 1 tablet by mouth daily, Disp: , Rfl:     QUEtiapine (SEROquel) 50 mg tablet, Take 1 tablet (50 mg total) by mouth daily at bedtime, Disp: 90 tablet, Rfl: 0    triamterene-hydrochlorothiazide (MAXZIDE-25) 37.5-25 mg per tablet, Take 1 tablet by mouth daily, Disp: 90 tablet, Rfl: 0    zolpidem (AMBIEN CR) 12.5 MG CR tablet, Take 1 tablet (12.5 mg total) by mouth daily at bedtime, Disp: 90 tablet, Rfl: 0    buPROPion (WELLBUTRIN XL) 300 mg 24 hr tablet, Take 1 tablet (300 mg total) by mouth every morning (Patient not taking: Reported on 10/8/2023), Disp: 90 tablet, Rfl: 0    naloxone (NARCAN) 4 mg/0.1 mL nasal spray, Administer 1 spray into a nostril. If no response after 2-3 minutes, give another dose in the other nostril using a new spray., Disp: 1 each, Rfl: 1    NIFEdipine (PROCARDIA XL) 60 mg 24 hr tablet, Take 1 tablet (60 mg total) by mouth daily, Disp: 90 tablet, Rfl: 0    SUBOXONE 8-2 MG, TAKE ONE TO ONE & ONE-HALF films UNDER THE TONGUE EVERY DAY, Disp: , Rfl: 0    Current Allergies     Allergies as of 10/08/2023 - Reviewed 10/08/2023   Allergen Reaction Noted    Shellfish allergy - food allergy Anaphylaxis and Other (See Comments) 2016    Nsaids Other (See Comments) 2016    Latex Hives and Rash 2016            The following portions of the patient's history were reviewed and updated as appropriate: allergies, current medications, past family history, past medical history, past social history, past surgical history and problem list.     Past Medical History:   Diagnosis Date    Anxiety     CHF (congestive heart failure) ([address])     Hypertension     Insomnia        Past Surgical History:   Procedure Laterality Date    APPENDECTOMY      BACK SURGERY      BREAST SURGERY      CHOLECYSTECTOMY      GASTRIC BYPASS         Family History   Problem Relation Age of Onset    Alzheimer's disease Mother     Cancer Father             Substance Abuse Neg Hx     Mental illness Neg Hx          Medications have been verified. Objective   /80   Pulse 79   Temp 98.2 °F (36.8 °C)   Resp 18   LMP  (LMP Unknown)   SpO2 98%   No LMP recorded (lmp unknown).  Patient is postmenopausal.       Physical Exam     Physical Exam  Vitals and nursing note reviewed. Constitutional:       General: She is not in acute distress. Appearance: She is not toxic-appearing. HENT:      Head: Normocephalic and atraumatic. Eyes:      Conjunctiva/sclera: Conjunctivae normal.   Pulmonary:      Effort: Pulmonary effort is normal.   Musculoskeletal:         General: No swelling. Comments: Radial pulse 2+  No TTP  ROM equal B/L with discomfort  Negative Mccarthy and Empty can      Skin:     Capillary Refill: Capillary refill takes less than 2 seconds. Findings: No bruising or erythema. Neurological:      Mental Status: She is alert.    Psychiatric:         Mood and Affect: Mood normal.         Behavior: Behavior normal.

## 2023-10-17 ENCOUNTER — OFFICE VISIT (OUTPATIENT)
Dept: OBGYN CLINIC | Facility: CLINIC | Age: 53
End: 2023-10-17
Payer: COMMERCIAL

## 2023-10-17 VITALS
HEIGHT: 61 IN | WEIGHT: 155 LBS | SYSTOLIC BLOOD PRESSURE: 120 MMHG | DIASTOLIC BLOOD PRESSURE: 80 MMHG | BODY MASS INDEX: 29.27 KG/M2

## 2023-10-17 DIAGNOSIS — M75.81 TENDINITIS OF RIGHT ROTATOR CUFF: Primary | ICD-10-CM

## 2023-10-17 PROCEDURE — 99203 OFFICE O/P NEW LOW 30 MIN: CPT | Performed by: ORTHOPAEDIC SURGERY

## 2023-10-17 PROCEDURE — 20610 DRAIN/INJ JOINT/BURSA W/O US: CPT | Performed by: ORTHOPAEDIC SURGERY

## 2023-10-17 RX ORDER — LIDOCAINE HYDROCHLORIDE 10 MG/ML
7 INJECTION, SOLUTION INFILTRATION; PERINEURAL
Status: COMPLETED | OUTPATIENT
Start: 2023-10-17 | End: 2023-10-17

## 2023-10-17 RX ORDER — BETAMETHASONE SODIUM PHOSPHATE AND BETAMETHASONE ACETATE 3; 3 MG/ML; MG/ML
6 INJECTION, SUSPENSION INTRA-ARTICULAR; INTRALESIONAL; INTRAMUSCULAR; SOFT TISSUE
Status: COMPLETED | OUTPATIENT
Start: 2023-10-17 | End: 2023-10-17

## 2023-10-17 RX ADMIN — LIDOCAINE HYDROCHLORIDE 7 ML: 10 INJECTION, SOLUTION INFILTRATION; PERINEURAL at 13:30

## 2023-10-17 RX ADMIN — BETAMETHASONE SODIUM PHOSPHATE AND BETAMETHASONE ACETATE 6 MG: 3; 3 INJECTION, SUSPENSION INTRA-ARTICULAR; INTRALESIONAL; INTRAMUSCULAR; SOFT TISSUE at 13:30

## 2023-10-17 NOTE — ASSESSMENT & PLAN NOTE
The patient has an examination consistent with rotator cuff tendinitis of the right shoulder. I have discussed with the patient the pathophysiology of this diagnosis and reviewed how the examination correlates with this diagnosis. Treatment options were discussed at length and after discussing these treatment options, the patient elected for and received a subacromial injection of corticosteroid (as described in the procedure note) with a prescription for referral to physical therapy. We will reevaluate the patient in 2-3 months. If the symptoms fail to improve with this treatment the patient would be indicated for further imaging in the form of an MRI scan of the shoulder. All patient's questions were answered to her satisfaction. This note is created using dictation transcription. It may contain typographical errors, grammatical errors, improperly dictated words, background noise and other errors.
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
## 2023-10-21 DIAGNOSIS — G47.00 INSOMNIA, UNSPECIFIED TYPE: ICD-10-CM

## 2023-10-21 DIAGNOSIS — F48.9 MENTAL HEALTH PROBLEM: ICD-10-CM

## 2023-10-21 DIAGNOSIS — F41.9 ANXIETY: ICD-10-CM

## 2023-10-21 DIAGNOSIS — F98.8 ATTENTION DEFICIT DISORDER, UNSPECIFIED HYPERACTIVITY PRESENCE: ICD-10-CM

## 2023-10-21 DIAGNOSIS — F32.A DEPRESSION, UNSPECIFIED DEPRESSION TYPE: ICD-10-CM

## 2023-10-22 RX ORDER — CLONAZEPAM 0.5 MG/1
0.5 TABLET ORAL 2 TIMES DAILY PRN
Qty: 30 TABLET | Refills: 0 | Status: SHIPPED | OUTPATIENT
Start: 2023-10-22

## 2023-10-24 RX ORDER — CLONAZEPAM 0.5 MG/1
0.5 TABLET ORAL 2 TIMES DAILY PRN
Qty: 30 TABLET | Refills: 0 | Status: SHIPPED | OUTPATIENT
Start: 2023-10-24

## 2023-10-24 RX ORDER — QUETIAPINE FUMARATE 50 MG/1
50 TABLET, FILM COATED ORAL
Qty: 90 TABLET | Refills: 0 | Status: SHIPPED | OUTPATIENT
Start: 2023-10-24

## 2023-10-24 RX ORDER — ZOLPIDEM TARTRATE 12.5 MG/1
12.5 TABLET, FILM COATED, EXTENDED RELEASE ORAL
Qty: 90 TABLET | Refills: 0 | Status: SHIPPED | OUTPATIENT
Start: 2023-10-24

## 2023-10-24 RX ORDER — LISDEXAMFETAMINE DIMESYLATE CAPSULES 50 MG/1
50 CAPSULE ORAL EVERY MORNING
Qty: 30 CAPSULE | Refills: 0 | Status: SHIPPED | OUTPATIENT
Start: 2023-10-24

## 2023-11-07 DIAGNOSIS — F98.8 ATTENTION DEFICIT DISORDER, UNSPECIFIED HYPERACTIVITY PRESENCE: ICD-10-CM

## 2023-11-07 RX ORDER — LISDEXAMFETAMINE DIMESYLATE CAPSULES 50 MG/1
50 CAPSULE ORAL EVERY MORNING
Qty: 30 CAPSULE | Refills: 0 | Status: SHIPPED | OUTPATIENT
Start: 2023-11-07

## 2023-11-17 ENCOUNTER — TELEPHONE (OUTPATIENT)
Age: 53
End: 2023-11-17

## 2023-11-17 DIAGNOSIS — M25.511 ACUTE PAIN OF RIGHT SHOULDER: Primary | ICD-10-CM

## 2023-11-17 NOTE — TELEPHONE ENCOUNTER
Caller: Patient     Doctor: Amira Chavez    Reason for call: Patient requesting MRI order be placed for right shoulder. She is not having improvement with home PT.  Please advise     Call back#: [phone]

## 2023-11-29 ENCOUNTER — TELEPHONE (OUTPATIENT)
Age: 53
End: 2023-11-29

## 2023-11-29 NOTE — TELEPHONE ENCOUNTER
Caller: ROBBIN @ [facility]     Doctor: Claude Delaney     Reason for call: ROBBIN is requesting for NPI , Procedure Code , Diagnosis Code for patient MRI for Prior Auth .  Please advise     Call back#: [phone]

## 2023-12-01 DIAGNOSIS — G47.00 INSOMNIA, UNSPECIFIED TYPE: ICD-10-CM

## 2023-12-01 DIAGNOSIS — F98.8 ATTENTION DEFICIT DISORDER, UNSPECIFIED HYPERACTIVITY PRESENCE: ICD-10-CM

## 2023-12-01 DIAGNOSIS — F32.A DEPRESSION, UNSPECIFIED DEPRESSION TYPE: ICD-10-CM

## 2023-12-01 DIAGNOSIS — F48.9 MENTAL HEALTH PROBLEM: ICD-10-CM

## 2023-12-01 DIAGNOSIS — F41.9 ANXIETY: ICD-10-CM

## 2023-12-01 RX ORDER — LISDEXAMFETAMINE DIMESYLATE CAPSULES 50 MG/1
50 CAPSULE ORAL EVERY MORNING
Qty: 30 CAPSULE | Refills: 0 | Status: SHIPPED | OUTPATIENT
Start: 2023-12-01

## 2023-12-01 RX ORDER — QUETIAPINE FUMARATE 50 MG/1
50 TABLET, FILM COATED ORAL
Qty: 90 TABLET | Refills: 0 | Status: SHIPPED | OUTPATIENT
Start: 2023-12-01

## 2023-12-01 RX ORDER — CLONAZEPAM 0.5 MG/1
0.5 TABLET ORAL 2 TIMES DAILY PRN
Qty: 30 TABLET | Refills: 0 | Status: SHIPPED | OUTPATIENT
Start: 2023-12-01

## 2023-12-04 ENCOUNTER — TELEPHONE (OUTPATIENT)
Dept: FAMILY MEDICINE CLINIC | Facility: HOSPITAL | Age: 53
End: 2023-12-04

## 2023-12-04 NOTE — TELEPHONE ENCOUNTER
Hi, this is Ivana Ahuja. I'm a member can advocate with [address] calling regarding a mutual patient for last name Mu that's F like [employer], first name Edvin Waters Date of birth [birth year]. I am calling for a claim just rejecting at the pharmacy for the list Dexamphetamine 50 milligram for a for an H requirement clinical review. If you would like to initiate a prior authorization please call the phone number at [phone] or you can fax to [phone]. If you have any additional questions, please give me a call back [phone]. Thank you and have a great day.

## 2023-12-07 ENCOUNTER — TELEPHONE (OUTPATIENT)
Dept: FAMILY MEDICINE CLINIC | Facility: HOSPITAL | Age: 53
End: 2023-12-07

## 2023-12-07 ENCOUNTER — HOSPITAL ENCOUNTER (OUTPATIENT)
Dept: MRI IMAGING | Facility: HOSPITAL | Age: 53
End: 2023-12-07
Attending: ORTHOPAEDIC SURGERY
Payer: COMMERCIAL

## 2023-12-07 DIAGNOSIS — M25.511 ACUTE PAIN OF RIGHT SHOULDER: ICD-10-CM

## 2023-12-07 PROCEDURE — 73221 MRI JOINT UPR EXTREM W/O DYE: CPT

## 2023-12-07 PROCEDURE — G1004 CDSM NDSC: HCPCS

## 2023-12-07 NOTE — TELEPHONE ENCOUNTER
[facility] calling about patient's Vyvanse. Brand and generic need a PA. Rep was going to have a PA form faxed.     To start a PA pls call [phone]  Fax: [phone]

## 2023-12-13 ENCOUNTER — OFFICE VISIT (OUTPATIENT)
Dept: OBGYN CLINIC | Facility: CLINIC | Age: 53
End: 2023-12-13
Payer: COMMERCIAL

## 2023-12-13 DIAGNOSIS — M75.81 TENDINITIS OF RIGHT ROTATOR CUFF: Primary | ICD-10-CM

## 2023-12-13 PROCEDURE — 99214 OFFICE O/P EST MOD 30 MIN: CPT | Performed by: ORTHOPAEDIC SURGERY

## 2023-12-13 RX ORDER — METHYLPREDNISOLONE 4 MG/1
TABLET ORAL
Qty: 21 TABLET | Refills: 0 | Status: SHIPPED | OUTPATIENT
Start: 2023-12-13
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
## 2023-12-14 ENCOUNTER — TELEPHONE (OUTPATIENT)
Dept: FAMILY MEDICINE CLINIC | Facility: HOSPITAL | Age: 53
End: 2023-12-14

## 2023-12-14 DIAGNOSIS — G47.00 INSOMNIA, UNSPECIFIED TYPE: ICD-10-CM

## 2023-12-14 RX ORDER — ZOLPIDEM TARTRATE 12.5 MG/1
12.5 TABLET, FILM COATED, EXTENDED RELEASE ORAL
Qty: 90 TABLET | Refills: 0 | Status: SHIPPED | OUTPATIENT
Start: 2023-12-14

## 2023-12-15 DIAGNOSIS — F98.8 ATTENTION DEFICIT DISORDER, UNSPECIFIED HYPERACTIVITY PRESENCE: Primary | ICD-10-CM

## 2023-12-15 RX ORDER — DEXTROAMPHETAMINE SACCHARATE, AMPHETAMINE ASPARTATE, DEXTROAMPHETAMINE SULFATE AND AMPHETAMINE SULFATE 5; 5; 5; 5 MG/1; MG/1; MG/1; MG/1
20 TABLET ORAL
Qty: 60 TABLET | Refills: 0 | Status: SHIPPED | OUTPATIENT
Start: 2023-12-15

## 2023-12-26 ENCOUNTER — EVALUATION (OUTPATIENT)
Dept: PHYSICAL THERAPY | Facility: CLINIC | Age: 53
End: 2023-12-26
Payer: COMMERCIAL

## 2023-12-26 DIAGNOSIS — M75.81 TENDINITIS OF RIGHT ROTATOR CUFF: Primary | ICD-10-CM

## 2023-12-26 PROCEDURE — 97140 MANUAL THERAPY 1/> REGIONS: CPT | Performed by: PHYSICAL THERAPIST

## 2023-12-26 PROCEDURE — 97161 PT EVAL LOW COMPLEX 20 MIN: CPT | Performed by: PHYSICAL THERAPIST

## 2024-01-02 ENCOUNTER — OFFICE VISIT (OUTPATIENT)
Dept: FAMILY MEDICINE CLINIC | Facility: HOSPITAL | Age: 54
End: 2024-01-02
Payer: COMMERCIAL

## 2024-01-02 VITALS
HEART RATE: 65 BPM | HEIGHT: 61 IN | WEIGHT: 157 LBS | SYSTOLIC BLOOD PRESSURE: 108 MMHG | BODY MASS INDEX: 29.64 KG/M2 | DIASTOLIC BLOOD PRESSURE: 76 MMHG | OXYGEN SATURATION: 99 %

## 2024-01-02 DIAGNOSIS — F98.8 ATTENTION DEFICIT DISORDER, UNSPECIFIED HYPERACTIVITY PRESENCE: ICD-10-CM

## 2024-01-02 DIAGNOSIS — G89.29 CHRONIC RIGHT SHOULDER PAIN: Primary | ICD-10-CM

## 2024-01-02 DIAGNOSIS — F32.A DEPRESSION, UNSPECIFIED DEPRESSION TYPE: ICD-10-CM

## 2024-01-02 DIAGNOSIS — F11.20 CONTINUOUS OPIOID DEPENDENCE (HCC): ICD-10-CM

## 2024-01-02 DIAGNOSIS — I15.9 SECONDARY HYPERTENSION: ICD-10-CM

## 2024-01-02 DIAGNOSIS — M75.51 SUBACROMIAL BURSITIS OF RIGHT SHOULDER JOINT: ICD-10-CM

## 2024-01-02 DIAGNOSIS — G47.00 INSOMNIA, UNSPECIFIED TYPE: ICD-10-CM

## 2024-01-02 DIAGNOSIS — M25.511 CHRONIC RIGHT SHOULDER PAIN: Primary | ICD-10-CM

## 2024-01-02 DIAGNOSIS — F41.9 ANXIETY: ICD-10-CM

## 2024-01-02 PROCEDURE — 99214 OFFICE O/P EST MOD 30 MIN: CPT | Performed by: STUDENT IN AN ORGANIZED HEALTH CARE EDUCATION/TRAINING PROGRAM

## 2024-01-02 PROCEDURE — 99396 PREV VISIT EST AGE 40-64: CPT | Performed by: STUDENT IN AN ORGANIZED HEALTH CARE EDUCATION/TRAINING PROGRAM

## 2024-01-02 RX ORDER — DEXTROAMPHETAMINE SACCHARATE, AMPHETAMINE ASPARTATE, DEXTROAMPHETAMINE SULFATE AND AMPHETAMINE SULFATE 5; 5; 5; 5 MG/1; MG/1; MG/1; MG/1
20 TABLET ORAL
Qty: 60 TABLET | Refills: 0 | Status: SHIPPED | OUTPATIENT
Start: 2024-01-02 | End: 2024-01-24 | Stop reason: SDUPTHER

## 2024-01-02 RX ORDER — CLONAZEPAM 0.5 MG/1
0.5 TABLET ORAL 2 TIMES DAILY PRN
Qty: 30 TABLET | Refills: 1 | Status: SHIPPED | OUTPATIENT
Start: 2024-01-02 | End: 2024-01-24 | Stop reason: SDUPTHER

## 2024-01-02 RX ORDER — BUPROPION HYDROCHLORIDE 300 MG/1
300 TABLET ORAL EVERY MORNING
Qty: 90 TABLET | Refills: 0 | Status: SHIPPED | OUTPATIENT
Start: 2024-01-02 | End: 2024-01-02

## 2024-01-02 NOTE — PROGRESS NOTES
ADULT ANNUAL PHYSICAL  [facility] [address]    NAME: Joanie Dobbins  AGE: 53 y.o. SEX: female  : 1970      Assessment and Plan:      Diagnosis ICD-10-CM Associated Orders   1. Chronic right shoulder pain  M25.511     G89.29       2. Secondary hypertension  I15.9       3. Depression, unspecified depression type  F32.A       4. Insomnia, unspecified type  G47.00       5. Anxiety  F41.9       6. Attention deficit disorder, unspecified hyperactivity presence  F98.8       7. Continuous opioid dependence (HCC)  F11.20       8. Subacromial bursitis of right shoulder joint  M75.51         Patient's medications were reviewed and reconciled.    Ordered/Re-ordered - klonopin, wellbutrin, & adderall.     R shoulder + mejia, and tendonitis of R RTC. See ortho & PT.     Immunizations and preventive care screenings were discussed with patient today. Appropriate education was printed on patient's after visit summary.    Counseling:  Alcohol/drug use: discussed moderation in alcohol intake, the recommendations for healthy alcohol use, and avoidance of illicit drug use.  Dental Health: discussed importance of regular tooth brushing, flossing, and dental visits.  Injury prevention: discussed safety/seat belts, safety helmets, smoke detectors, carbon dioxide detectors, and smoking near bedding or upholstery.  Sexual health: discussed sexually transmitted diseases, partner selection, use of condoms, avoidance of unintended pregnancy, and contraceptive alternatives.  Exercise: the importance of regular exercise/physical activity was discussed. Recommend exercise 3-5 times per week for at least 30 minutes.       Depression Screening and Follow-up Plan: Patient was screened for depression during today's encounter. They screened negative with a PHQ-2 score of 0.    F/U in 6 months or sooner as needed     Chief Complaint:     Chief Complaint   Patient presents with     Physical Exam    Shoulder Pain      History of Present Illness:     Adult Annual Physical   Patient here for a comprehensive physical exam.   Seeing ortho for R shoulder RTC issues. 4 months of this since Sept.   LHD. Possibly from yoga.   CSI given. Medrol dose sadiq also given.   Seeing PT now & terribly painful.     Diet and Physical Activity  Diet/Nutrition: well balanced diet, watches portion sizes  Exercise:  yoga daily , limited with pain of the shoulder.    No Drug use.   Tobacco use:  reports that she has quit smoking. Her smoking use included cigarettes. She has never used smokeless tobacco.  Alcohol use - Rare/occasional/frequent: no    Wt Readings from Last 3 Encounters:   02/07/24 70.8 kg (156 lb)   01/02/24 71.2 kg (157 lb)   10/17/23 70.3 kg (155 lb)     Temp Readings from Last 3 Encounters:   10/08/23 98.2 °F (36.8 °C)   04/07/21 98.4 °F (36.9 °C) (Tympanic)   12/07/20 97.9 °F (36.6 °C) (Temporal)     BP Readings from Last 3 Encounters:   02/07/24 112/72   01/02/24 108/76   10/17/23 120/80     Pulse Readings from Last 3 Encounters:   01/02/24 65   10/08/23 79   12/07/22 98     Depression Screening  PHQ-2/9 Depression Screening    Little interest or pleasure in doing things: 0 - not at all  Feeling down, depressed, or hopeless: 0 - not at all  PHQ-2 Score: 0  PHQ-2 Interpretation: Negative depression screen       General Health  Sleep:  ambien usually helps, but now waking up with shoulder pain .   Hearing: normal - bilateral.  Vision: goes for regular eye exams and wears glasses.   Dental: regular dental visits and brushes teeth twice daily.       /GYN Health  Follows with gynecology? yes   Patient is: postmenopausal     Review of Systems:     Review of Systems   Constitutional:  Negative for chills and fever.   Respiratory:  Negative for cough and shortness of breath.    Cardiovascular:  Negative for chest pain and palpitations.   Neurological:  Negative for light-headedness and headaches.      Past  Medical History:     Past Medical History:   Diagnosis Date    Anxiety     CHF (congestive heart failure) (HCC)     Hypertension     Insomnia       Past Surgical History:     Past Surgical History:   Procedure Laterality Date    APPENDECTOMY      BACK SURGERY      L4-5 fusion; cervical fusion    BREAST SURGERY      CHOLECYSTECTOMY      GASTRIC BYPASS        Social History:     Social History     Socioeconomic History    Marital status: /Civil Union     Spouse name: None    Number of children: None    Years of education: None    Highest education level: None   Occupational History    None   Tobacco Use    Smoking status: Former     Types: Cigarettes    Smokeless tobacco: Never    Tobacco comments:     quit 2010   Vaping Use    Vaping status: Never Used   Substance and Sexual Activity    Alcohol use: No    Drug use: No    Sexual activity: Yes     Partners: Male     Birth control/protection: None   Other Topics Concern    None   Social History Narrative    Lives with     Feels safe at home    Sees dentist reg    No living will     Social Determinants of Health     Financial Resource Strain: Low Risk  (6/18/2020)    Overall Financial Resource Strain (CARDIA)     Difficulty of Paying Living Expenses: Not hard at all   Food Insecurity: No Food Insecurity (6/18/2020)    Hunger Vital Sign     Worried About Running Out of Food in the Last Year: Never true     Ran Out of Food in the Last Year: Never true   Transportation Needs: No Transportation Needs (6/18/2020)    PRAPARE - Transportation     Lack of Transportation (Medical): No     Lack of Transportation (Non-Medical): No   Physical Activity: Inactive (6/18/2020)    Exercise Vital Sign     Days of Exercise per Week: 0 days     Minutes of Exercise per Session: 0 min   Stress: Stress Concern Present (6/18/2020)    Citizen of [place] of Occupational Health - Occupational Stress Questionnaire     Feeling of Stress : To some extent   Social Connections: Not on file    Intimate Partner Violence: Not At Risk (2020)    Humiliation, Afraid, Rape, and Kick questionnaire     Fear of Current or Ex-Partner: No     Emotionally Abused: No     Physically Abused: No     Sexually Abused: No   Housing Stability: Not on file      Family History:     Family History   Problem Relation Age of Onset    Alzheimer's disease Mother     Cancer Father             Substance Abuse Neg Hx     Mental illness Neg Hx       Current Medications:     Current Outpatient Medications   Medication Sig Dispense Refill    furosemide (LASIX) 20 mg tablet TAKE 1 TABLET BY MOUTH DAILY AS NEEDED (EDEMA). 90 tablet 1    Multiple Vitamins-Minerals (MULTIVITAL-M) TABS Take 1 tablet by mouth daily      naloxone (NARCAN) 4 mg/0.1 mL nasal spray Administer 1 spray into a nostril. If no response after 2-3 minutes, give another dose in the other nostril using a new spray. 1 each 1    SUBOXONE 8-2 MG TAKE ONE TO ONE & ONE-HALF films UNDER THE TONGUE EVERY DAY  0    amphetamine-dextroamphetamine (ADDERALL, 20MG,) 20 mg tablet Take 1 tablet (20 mg total) by mouth 2 (two) times a day Max Daily Amount: 40 mg 60 tablet 0    clonazePAM (KlonoPIN) 0.5 mg tablet Take 1 tablet (0.5 mg total) by mouth 2 (two) times a day as needed for anxiety 30 tablet 0    NIFEdipine (PROCARDIA XL) 60 mg 24 hr tablet Take 1 tablet (60 mg total) by mouth daily 90 tablet 0    QUEtiapine (SEROquel) 50 mg tablet Take 1 tablet (50 mg total) by mouth daily at bedtime 90 tablet 0    triamterene-hydrochlorothiazide (MAXZIDE-25) 37.5-25 mg per tablet Take 1 tablet by mouth daily 90 tablet 0    zolpidem (AMBIEN CR) 12.5 MG CR tablet Take 1 tablet (12.5 mg total) by mouth daily at bedtime 90 tablet 0     No current facility-administered medications for this visit.      Allergies:     Allergies   Allergen Reactions    Shellfish Allergy - Food Allergy Anaphylaxis and Other (See Comments)     Category: Adverse Reaction;   hives diff breathing  hives diff  "breathing    Nsaids Other (See Comments)    Latex Hives and Rash     Category: Adverse Reaction;       Physical Exam:     /76   Pulse 65   Ht 5' 1\" (1.549 m)   Wt 71.2 kg (157 lb)   LMP  (LMP Unknown)   SpO2 99%   BMI 29.66 kg/m²     Physical Exam  Vitals reviewed.   Constitutional:       General: She is not in acute distress.     Appearance: Normal appearance. She is normal weight. She is not ill-appearing.   HENT:      Head: Normocephalic and atraumatic.      Right Ear: Tympanic membrane, ear canal and external ear normal. There is no impacted cerumen.      Left Ear: Tympanic membrane, ear canal and external ear normal. There is no impacted cerumen.      Nose: Nose normal. No congestion or rhinorrhea.      Mouth/Throat:      Mouth: Mucous membranes are moist.      Pharynx: Oropharynx is clear. No oropharyngeal exudate or posterior oropharyngeal erythema.   Eyes:      General: No scleral icterus.        Right eye: No discharge.         Left eye: No discharge.      Conjunctiva/sclera: Conjunctivae normal.   Cardiovascular:      Rate and Rhythm: Normal rate and regular rhythm.      Pulses: Normal pulses.      Heart sounds: Normal heart sounds. No murmur heard.     No friction rub. No gallop.   Pulmonary:      Effort: Pulmonary effort is normal. No respiratory distress.      Breath sounds: Normal breath sounds. No stridor. No wheezing.   Musculoskeletal:         General: Tenderness (LHB tendon pain) present. No swelling. Normal range of motion.      Cervical back: Normal range of motion and neck supple. No rigidity.      Right lower leg: No edema.      Left lower leg: No edema.      Comments: Speed's +, supra testing +, infra + for pain.    Lymphadenopathy:      Cervical: No cervical adenopathy.   Skin:     General: Skin is warm and dry.      Capillary Refill: Capillary refill takes less than 2 seconds.      Coloration: Skin is not jaundiced or pale.   Neurological:      Mental Status: She is alert and " oriented to person, place, and time.      Gait: Gait normal.   Psychiatric:         Mood and Affect: Mood normal.         Behavior: Behavior normal.         Thought Content: Thought content normal.         Judgment: Judgment normal.        Lauren Lerma DO  [facility] [address]

## 2024-01-04 ENCOUNTER — OFFICE VISIT (OUTPATIENT)
Dept: PHYSICAL THERAPY | Facility: CLINIC | Age: 54
End: 2024-01-04
Payer: COMMERCIAL

## 2024-01-04 DIAGNOSIS — M75.81 TENDINITIS OF RIGHT ROTATOR CUFF: Primary | ICD-10-CM

## 2024-01-04 PROCEDURE — 97140 MANUAL THERAPY 1/> REGIONS: CPT

## 2024-01-04 PROCEDURE — 97110 THERAPEUTIC EXERCISES: CPT

## 2024-01-04 NOTE — PROGRESS NOTES
"Daily Note     Today's date: 2024  Patient name: Joanie Dobbins  : 1970  MRN: [id number]  Referring provider: Amanda Moreno MD  Dx:   Encounter Diagnosis     ICD-10-CM    1. Tendinitis of right rotator cuff  M75.81                      Subjective: Pt reports no change in symptoms, and a lot of soreness following IE      Objective: See treatment diary below      Assessment: Initiated PT POC today. Pt responded well to manual therapy which was primary focus of today's session. She was able to do some light TE's but kept the strengthening lighter as a result of LV.  Tolerated treatment well. Patient demonstrated fatigue post treatment, exhibited good technique with therapeutic exercises, and would benefit from continued PT      Plan: Continue per plan of care.  Progress treatment as tolerated.       Precautions: severe R supraspin, infra tendinosis      HEP:  Access Code: [email or web address]  URL: [email or web address]  Date: 2023  Prepared by: Humera Saab    Exercises  - Standing Isometric Shoulder External Rotation with Doorway  - 10 reps - 5 sec hold  - Standing Isometric Shoulder Flexion with Doorway - Arm Bent  - 10 reps - 5 sec hold  - Standing Isometric Shoulder Abduction with Doorway - Arm Bent  - 10 reps - 5 sec hold      Manuals            R shoulder STM Ant delt, RC WE           R sh' inf mob GORAN            R sh' AP mob GORAN            R sh' flex iso Elbow bent 15\"x3 WE           R sh' ER iso 15\"x3 WE           R sh' abd iso In S/L, elbow bent 15\"x3 WE           Cervical ROM  WE           Neuro Re-Ed                                                                 Ther Ex             UBE - cardio, ROM  3'/3'           pulleys  2'/2'           Sh' flex iso 5\"x10            Sh' ER iso 5\"x10            Sh' IR iso             Sh' abd iso 5\"x10            Sh' ext iso             Bicep curl  x20           Tricep ext             Serratus punch             Prone T             Prone Y "             Sh' ext  OTB  x20           row  OTB  x20           scaption                          Ther Activity                                       Gait Training                                       Modalities

## 2024-01-09 ENCOUNTER — OFFICE VISIT (OUTPATIENT)
Dept: PHYSICAL THERAPY | Facility: CLINIC | Age: 54
End: 2024-01-09
Payer: COMMERCIAL

## 2024-01-09 DIAGNOSIS — M75.81 TENDINITIS OF RIGHT ROTATOR CUFF: Primary | ICD-10-CM

## 2024-01-09 PROCEDURE — 97140 MANUAL THERAPY 1/> REGIONS: CPT

## 2024-01-09 PROCEDURE — 97110 THERAPEUTIC EXERCISES: CPT

## 2024-01-09 NOTE — PROGRESS NOTES
"Daily Note     Today's date: 2024  Patient name: Joanie Dobbins  : 1970  MRN: [id number]  Referring provider: Amanda Moreno MD  Dx:   Encounter Diagnosis     ICD-10-CM    1. Tendinitis of right rotator cuff  M75.81                      Subjective: Pt reports she had some relief for a day or two following her lv but pain returns.       Objective: See treatment diary below      Assessment: Pt continues to respond well to manual therapy. Some tightening noted of the R sided cervical area which eased with manual work. Able to tolerate TE's but any shoulder TE's she fatigues quickly.  Tolerated treatment well. Patient demonstrated fatigue post treatment, exhibited good technique with therapeutic exercises, and would benefit from continued PT      Plan: Continue per plan of care.  Progress treatment as tolerated.       Precautions: severe R supraspin, infra tendinosis      HEP:  Access Code: [email or web address]  URL: [email or web address]  Date: 2023  Prepared by: Humera Saab    Exercises  - Standing Isometric Shoulder External Rotation with Doorway  - 10 reps - 5 sec hold  - Standing Isometric Shoulder Flexion with Doorway - Arm Bent  - 10 reps - 5 sec hold  - Standing Isometric Shoulder Abduction with Doorway - Arm Bent  - 10 reps - 5 sec hold      Manuals           R shoulder STM Ant delt, RC WE WE          R sh' inf mob GORAN            R sh' AP mob GORAN            R sh' flex iso Elbow bent 15\"x3 WE WE          R sh' ER iso 15\"x3 WE WE          R sh' abd iso In S/L, elbow bent 15\"x3 WE WE          Cervical ROM  WE WE          Neuro Re-Ed                                                                 Ther Ex             UBE - cardio, ROM  3'/3' 3'/3'          pulleys  2'/2' 2'/2'          Sh' flex iso 5\"x10  5\"x10          Sh' ER iso 5\"x10  5\"x10          Sh' IR iso   5\"x10          Sh' abd iso 5\"x10  5\"x10          Sh' ext iso             Bicep curl  x20 20          Tricep ext      "        Serratus punch             Prone T             Prone Y             Sh' ext  OTB  x20 GTB  x20          Sh' TB b/l ER   OTB  x20          row  OTB  x20 GTB  x20          scaption                          Ther Activity                                       Gait Training                                       Modalities

## 2024-01-11 ENCOUNTER — OFFICE VISIT (OUTPATIENT)
Dept: PHYSICAL THERAPY | Facility: CLINIC | Age: 54
End: 2024-01-11
Payer: COMMERCIAL

## 2024-01-11 ENCOUNTER — TELEPHONE (OUTPATIENT)
Dept: FAMILY MEDICINE CLINIC | Facility: HOSPITAL | Age: 54
End: 2024-01-11

## 2024-01-11 DIAGNOSIS — M75.81 TENDINITIS OF RIGHT ROTATOR CUFF: Primary | ICD-10-CM

## 2024-01-11 PROCEDURE — 97110 THERAPEUTIC EXERCISES: CPT

## 2024-01-11 PROCEDURE — 97140 MANUAL THERAPY 1/> REGIONS: CPT

## 2024-01-11 NOTE — TELEPHONE ENCOUNTER
Patient calling about prior-auth for her adderall.  Script was sent to the pharmacy but pharm is saying it needs prior auth again.    Was this prior-auth'd last month?  If so, what was the expiration date?  Is the insurance requiring monthly prior-authorizations?    Pls call back to patient.

## 2024-01-11 NOTE — TELEPHONE ENCOUNTER
Left PT know that the last prior auth was done for Vyvanse - not Adderall - will do a pa and let PT know when I hear from her insurance.

## 2024-01-11 NOTE — PROGRESS NOTES
"Daily Note     Today's date: 2024  Patient name: Joanie Dobbins  : 1970  MRN: [id number]  Referring provider: Amanda Moreno MD  Dx:   Encounter Diagnosis     ICD-10-CM    1. Tendinitis of right rotator cuff  M75.81                      Subjective: Pt reports pt reports no change in symptoms since LV.       Objective: See treatment diary below      Assessment: Pt continues to have bicep and delt attachment area pain, as well as UT tightness and soreness. She tolerates TE's, and has no nerve tension.    Tolerated treatment well. Patient demonstrated fatigue post treatment, exhibited good technique with therapeutic exercises, and would benefit from continued PT      Plan: Continue per plan of care.  Progress treatment as tolerated.       Precautions: severe R supraspin, infra tendinosis      HEP:  Access Code: [email or web address]  URL: [email or web address]  Date: 2023  Prepared by: Humera Saab    Exercises  - Standing Isometric Shoulder External Rotation with Doorway  - 10 reps - 5 sec hold  - Standing Isometric Shoulder Flexion with Doorway - Arm Bent  - 10 reps - 5 sec hold  - Standing Isometric Shoulder Abduction with Doorway - Arm Bent  - 10 reps - 5 sec hold      Manuals          R shoulder STM Ant delt, RC WE WE WE         R sh' inf mob GORAN            R sh' AP mob GORAN            R sh' flex iso Elbow bent 15\"x3 WE WE          R sh' ER iso 15\"x3 WE WE          R sh' abd iso In S/L, elbow bent 15\"x3 WE WE          Cervical ROM  WE WE WE         Neuro Re-Ed                                                                 Ther Ex             UBE - cardio, ROM  3'/3' 3'/3' 3'/3'         pulleys  2'/2' 2'/2' 2'/2'         Sh' flex iso 5\"x10  5\"x10          Sh' ER iso 5\"x10  5\"x10          Sh' IR iso   5\"x10          Sh' abd iso 5\"x10  5\"x10          Sh' ext iso             Bicep curl  x20 20 x20         Tricep ext    GTB  x20         Serratus punch             Prone T         "     Prone Y             Sh' ext  OTB  x20 GTB  x20 GTB  x20         Sh' TB b/l ER   OTB  x20 OTB  x20         row  OTB  x20 GTB  x20 GTB  x20         scaption                          Ther Activity                                       Gait Training                                       Modalities

## 2024-01-16 ENCOUNTER — OFFICE VISIT (OUTPATIENT)
Dept: PHYSICAL THERAPY | Facility: CLINIC | Age: 54
End: 2024-01-16
Payer: COMMERCIAL

## 2024-01-16 DIAGNOSIS — M75.81 TENDINITIS OF RIGHT ROTATOR CUFF: Primary | ICD-10-CM

## 2024-01-16 PROCEDURE — 97110 THERAPEUTIC EXERCISES: CPT

## 2024-01-16 PROCEDURE — 97140 MANUAL THERAPY 1/> REGIONS: CPT

## 2024-01-16 NOTE — PROGRESS NOTES
"Daily Note     Today's date: 2024  Patient name: Joanie Dobbins  : 1970  MRN: [id number]  Referring provider: Amanda Moreno MD  Dx:   Encounter Diagnosis     ICD-10-CM    1. Tendinitis of right rotator cuff  M75.81                      Subjective: Pt reports no significant change since last visit      Objective: See treatment diary below      Assessment: Patient tolerated treatment well overall. She has TTP to anterior shoulder. Tried eccentric scaption but had report of pain in shoulder and elbow with this that did not get better with increased repetitions. Note less pain with shoulder flexion after repeated cervical retraction - given for HEP; instructed her to hold on any other HEP exercises. Will benefit from skilled PT to address impairments and return to PLOF      Plan: Continue per plan of care.  Progress treatment as tolerated.       Precautions: severe R supraspin, infra tendinosis      HEP:  Access Code: [email or web address]  URL: [email or web address]  Date: 2023  Prepared by: Humera Saab    Exercises  - Standing Isometric Shoulder External Rotation with Doorway  - 10 reps - 5 sec hold  - Standing Isometric Shoulder Flexion with Doorway - Arm Bent  - 10 reps - 5 sec hold  - Standing Isometric Shoulder Abduction with Doorway - Arm Bent  - 10 reps - 5 sec hold      Manuals         R shoulder STM Ant delt, RC WE WE WE MB        R sh' inf mob GORAN            R sh' AP mob GORAN            R sh' flex iso Elbow bent 15\"x3 WE WE          R sh' ER iso 15\"x3 WE WE          R sh' abd iso In S/L, elbow bent 15\"x3 WE WE          Cervical ROM  WE WE WE         Neuro Re-Ed                                                                 Ther Ex             UBE - cardio, ROM  3'/3' 3'/3' 3'/3' 3'/3        pulleys  2'/2' 2'/2' 2'/2'         Sh' flex iso 5\"x10  5\"x10          Sh' ER iso 5\"x10  5\"x10          Sh' IR iso   5\"x10          Sh' abd iso 5\"x10  5\"x10          Sh' ext " iso             Bicep curl  x20 20 x20         Tricep ext    GTB  x20         Serratus punch             Prone T             Prone Y             Sh' ext  OTB  x20 GTB  x20 GTB  x20 GTB 20x        Sh' TB b/l ER   OTB  x20 OTB  x20 OTB 20x        row  OTB  x20 GTB  x20 GTB  x20 GTB 20x        scaption             Seated cervical retraction     20x        Supine 90/90 ER     2# 2x10        Ther Activity                                       Gait Training                                       Modalities

## 2024-01-18 ENCOUNTER — OFFICE VISIT (OUTPATIENT)
Dept: PHYSICAL THERAPY | Facility: CLINIC | Age: 54
End: 2024-01-18
Payer: COMMERCIAL

## 2024-01-18 DIAGNOSIS — M75.81 TENDINITIS OF RIGHT ROTATOR CUFF: Primary | ICD-10-CM

## 2024-01-18 PROCEDURE — 97110 THERAPEUTIC EXERCISES: CPT

## 2024-01-18 PROCEDURE — 97140 MANUAL THERAPY 1/> REGIONS: CPT

## 2024-01-18 NOTE — PROGRESS NOTES
"Daily Note     Today's date: 2024  Patient name: Joanie Dobbins  : 1970  MRN: [id number]  Referring provider: Amanda Moreno MD  Dx:   Encounter Diagnosis     ICD-10-CM    1. Tendinitis of right rotator cuff  M75.81                      Subjective: Pt reports slight improvement in intensity and frequency, but still somewhat bothersome with ABD eccentrically       Objective: See treatment diary below      Assessment: Patient tolerated treatment well. She seems to continue to respond to cervical retraction which decreases the intensity of her symptoms which continue to be in inconsistent locations but always during eccentric lowering of the arm with ABD. C-spine seems to be slightly off center when is resting supine position and may benefit from slide glide cervical mobs in the coming visits. Will continue to monitor response to treatment and progress as able.     Plan: Continue per plan of care.  Progress treatment as tolerated.       Precautions: severe R supraspin, infra tendinosis      HEP:  Access Code: [email or web address]  URL: [email or web address]  Date: 2023  Prepared by: Humera Saab    Exercises  - Standing Isometric Shoulder External Rotation with Doorway  - 10 reps - 5 sec hold  - Standing Isometric Shoulder Flexion with Doorway - Arm Bent  - 10 reps - 5 sec hold  - Standing Isometric Shoulder Abduction with Doorway - Arm Bent  - 10 reps - 5 sec hold      Manuals  1       R shoulder STM Ant delt, RC WE WE WE MB WE       R sh' inf mob GORAN            R sh' AP mob GORAN            R sh' flex iso Elbow bent 15\"x3 WE WE          R sh' ER iso 15\"x3 WE WE          R sh' abd iso In S/L, elbow bent 15\"x3 WE WE          Cervical ROM  WE WE WE  WE       Neuro Re-Ed                                                                 Ther Ex             UBE - cardio, ROM  3'/3' 3'/3' 3'/3' 3'/3 3'/3'       pulleys  2'/2' 2'/2' 2'/2'         Sh' flex iso 5\"x10  5\"x10        " "  Sh' ER iso 5\"x10  5\"x10          Sh' IR iso   5\"x10          Sh' abd iso 5\"x10  5\"x10          Sh' ext iso             Bicep curl  x20 20 x20         Tricep ext    GTB  x20         Serratus punch             Prone T             Prone Y             Sh' ext  OTB  x20 GTB  x20 GTB  x20 GTB 20x GTB 20x       Sh' TB b/l ER   OTB  x20 OTB  x20 OTB 20x GTB 20x       row  OTB  x20 GTB  x20 GTB  x20 GTB 20x GTB 20x       TB No Moneys      GTB  x10       TB Horz ABD      GTB  x10       scaption             Seated cervical retraction     20x 20x       Seated cervical retraction with Ext      x10       Seated Tspine Ext over chair      10x2       Supine 90/90 ER     2# 2x10        Ther Activity                                       Gait Training                                       Modalities                                            "

## 2024-01-23 ENCOUNTER — OFFICE VISIT (OUTPATIENT)
Dept: PHYSICAL THERAPY | Facility: CLINIC | Age: 54
End: 2024-01-23
Payer: COMMERCIAL

## 2024-01-23 DIAGNOSIS — M75.81 TENDINITIS OF RIGHT ROTATOR CUFF: Primary | ICD-10-CM

## 2024-01-23 PROCEDURE — 97110 THERAPEUTIC EXERCISES: CPT

## 2024-01-23 PROCEDURE — 97140 MANUAL THERAPY 1/> REGIONS: CPT

## 2024-01-23 NOTE — PROGRESS NOTES
"Daily Note     Today's date: 2024  Patient name: Joanie Dobbins  : 1970  MRN: [id number]  Referring provider: Amanda Moreno MD  Dx:   Encounter Diagnosis     ICD-10-CM    1. Tendinitis of right rotator cuff  M75.81                      Subjective: Pt reports slight improvement.       Objective: See treatment diary below      Assessment: Patient tolerated treatment well. Continues to respond well to treatment. She no longer had shoulder pain with eccentric ABD which is great improvement but still has some if she is in Ext and ABD. Continues to benefit from soft tissue cervical work as well with improved cervical mobility as well. She would continue to benefit from skilled PT.     Plan: Continue per plan of care.  Progress treatment as tolerated.       Precautions: severe R supraspin, infra tendinosis      HEP:  Access Code: [email or web address]  URL: [email or web address]  Date: 2023  Prepared by: Humera Saab    Exercises  - Standing Isometric Shoulder External Rotation with Doorway  - 10 reps - 5 sec hold  - Standing Isometric Shoulder Flexion with Doorway - Arm Bent  - 10 reps - 5 sec hold  - Standing Isometric Shoulder Abduction with Doorway - Arm Bent  - 10 reps - 5 sec hold      Manuals       R shoulder STM Ant delt, RC WE WE WE MB WE WE      R sh' inf mob GORAN            R sh' AP mob GORAN            R sh' flex iso Elbow bent 15\"x3 WE WE          R sh' ER iso 15\"x3 WE WE          R sh' abd iso In S/L, elbow bent 15\"x3 WE WE          Cervical ROM  WE WE WE  WE WE      Neuro Re-Ed                                                                 Ther Ex             UBE - cardio, ROM  3'/3' 3'/3' 3'/3' 3'/3 3'/3' 3'/3'      pulleys  2'/2' 2'/2' 2'/2'         Sh' flex iso 5\"x10  5\"x10          Sh' ER iso 5\"x10  5\"x10          Sh' IR iso   5\"x10          Sh' abd iso 5\"x10  5\"x10          Sh' ext iso             Bicep curl  x20 20 x20         Tricep ext    " GTB  x20         Serratus punch             Prone T             Prone Y             Sh' ext  OTB  x20 GTB  x20 GTB  x20 GTB 20x GTB 20x GTB 20x      Sh' TB b/l ER   OTB  x20 OTB  x20 OTB 20x GTB 20x GTB 20x      row  OTB  x20 GTB  x20 GTB  x20 GTB 20x GTB 20x GTB 20x      TB No Moneys      GTB  x10 GTB  x10      TB Horz ABD      GTB  x10 GTB   x10      scaption             Seated cervical retraction     20x 20x       Seated cervical retraction with Ext      x10 x20      Seated Tspine Ext over chair      10x2 10x2      Supine 90/90 ER     2# 2x10        Ther Activity                                       Gait Training                                       Modalities

## 2024-01-24 DIAGNOSIS — F32.A DEPRESSION, UNSPECIFIED DEPRESSION TYPE: ICD-10-CM

## 2024-01-24 DIAGNOSIS — I15.9 SECONDARY HYPERTENSION: ICD-10-CM

## 2024-01-24 DIAGNOSIS — F48.9 MENTAL HEALTH PROBLEM: ICD-10-CM

## 2024-01-24 DIAGNOSIS — F41.9 ANXIETY: ICD-10-CM

## 2024-01-24 DIAGNOSIS — G47.00 INSOMNIA, UNSPECIFIED TYPE: ICD-10-CM

## 2024-01-24 DIAGNOSIS — F98.8 ATTENTION DEFICIT DISORDER, UNSPECIFIED HYPERACTIVITY PRESENCE: ICD-10-CM

## 2024-01-25 ENCOUNTER — APPOINTMENT (OUTPATIENT)
Dept: PHYSICAL THERAPY | Facility: CLINIC | Age: 54
End: 2024-01-25
Payer: COMMERCIAL

## 2024-01-26 ENCOUNTER — OFFICE VISIT (OUTPATIENT)
Dept: PHYSICAL THERAPY | Facility: CLINIC | Age: 54
End: 2024-01-26
Payer: COMMERCIAL

## 2024-01-26 DIAGNOSIS — M75.81 TENDINITIS OF RIGHT ROTATOR CUFF: Primary | ICD-10-CM

## 2024-01-26 PROCEDURE — 97110 THERAPEUTIC EXERCISES: CPT

## 2024-01-26 PROCEDURE — 97140 MANUAL THERAPY 1/> REGIONS: CPT

## 2024-01-26 NOTE — PROGRESS NOTES
"Daily Note     Today's date: 2024  Patient name: Joanie Dobbins  : 1970  MRN: [id number]  Referring provider: Amanda Moreno MD  Dx:   Encounter Diagnosis     ICD-10-CM    1. Tendinitis of right rotator cuff  M75.81                      Subjective: Pt reports R shoulder is really sore today. Notes she feels it needs a deep massage.       Objective: See treatment diary below      Assessment: Patient tolerated treatment well. Significant TTP to anterior shoulder - STM performed. Continued cervical ROM as she gets relief with this. Will benefit from skilled PT to address impairments and return to PLOF    Plan: Continue per plan of care.  Progress treatment as tolerated.       Precautions: severe R supraspin, infra tendinosis      HEP:  Access Code: [email or web address]  URL: [email or web address]  Date: 2023  Prepared by: Humera Saab    Exercises  - Standing Isometric Shoulder External Rotation with Doorway  - 10 reps - 5 sec hold  - Standing Isometric Shoulder Flexion with Doorway - Arm Bent  - 10 reps - 5 sec hold  - Standing Isometric Shoulder Abduction with Doorway - Arm Bent  - 10 reps - 5 sec hold      Manuals      R shoulder STM Ant delt, RC WE WE WE MB WE WE 8'     R sh' inf mob GORAN            R sh' AP mob GORAN            R sh' flex iso Elbow bent 15\"x3 WE WE          R sh' ER iso 15\"x3 WE WE          R sh' abd iso In S/L, elbow bent 15\"x3 WE WE          Cervical ROM  WE WE WE  WE WE 8'     Neuro Re-Ed                                                                 Ther Ex             UBE - cardio, ROM  3'/3' 3'/3' 3'/3' 3'/3 3'/3' 3'/3' 3'/3'     pulleys  2'/2' 2'/2' 2'/2'         Sh' flex iso 5\"x10  5\"x10          Sh' ER iso 5\"x10  5\"x10          Sh' IR iso   5\"x10          Sh' abd iso 5\"x10  5\"x10          Sh' ext iso             Bicep curl  x20 20 x20         Tricep ext    GTB  x20         Serratus punch             Prone T             Prone Y    " "         Sh' ext  OTB  x20 GTB  x20 GTB  x20 GTB 20x GTB 20x GTB 20x GTB 20x     Sh' TB b/l ER   OTB  x20 OTB  x20 OTB 20x GTB 20x GTB 20x      row  OTB  x20 GTB  x20 GTB  x20 GTB 20x GTB 20x GTB 20x GTB 20x     TB No Moneys      GTB  x10 GTB  x10      TB Horz ABD      GTB  x10 GTB   x10      Doorway pec stretch        20\"x3     Seated cervical retraction     20x 20x       Seated cervical retraction with Ext      x10 x20 20x     Seated Tspine Ext over chair      10x2 10x2 15x     Supine 90/90 ER     2# 2x10        Ther Activity                                       Gait Training                                       Modalities                                            "

## 2024-01-28 RX ORDER — TRIAMTERENE AND HYDROCHLOROTHIAZIDE 37.5; 25 MG/1; MG/1
1 TABLET ORAL DAILY
Qty: 90 TABLET | Refills: 0 | Status: SHIPPED | OUTPATIENT
Start: 2024-01-28

## 2024-01-28 RX ORDER — NIFEDIPINE 60 MG/1
60 TABLET, EXTENDED RELEASE ORAL DAILY
Qty: 90 TABLET | Refills: 0 | Status: SHIPPED | OUTPATIENT
Start: 2024-01-28

## 2024-01-28 RX ORDER — QUETIAPINE FUMARATE 50 MG/1
50 TABLET, FILM COATED ORAL
Qty: 90 TABLET | Refills: 0 | Status: SHIPPED | OUTPATIENT
Start: 2024-01-28

## 2024-01-28 RX ORDER — ZOLPIDEM TARTRATE 12.5 MG/1
12.5 TABLET, FILM COATED, EXTENDED RELEASE ORAL
Qty: 90 TABLET | Refills: 0 | Status: SHIPPED | OUTPATIENT
Start: 2024-01-28

## 2024-01-28 RX ORDER — DEXTROAMPHETAMINE SACCHARATE, AMPHETAMINE ASPARTATE, DEXTROAMPHETAMINE SULFATE AND AMPHETAMINE SULFATE 5; 5; 5; 5 MG/1; MG/1; MG/1; MG/1
20 TABLET ORAL
Qty: 60 TABLET | Refills: 0 | Status: SHIPPED | OUTPATIENT
Start: 2024-01-28

## 2024-01-28 RX ORDER — CLONAZEPAM 0.5 MG/1
0.5 TABLET ORAL 2 TIMES DAILY PRN
Qty: 30 TABLET | Refills: 0 | Status: SHIPPED | OUTPATIENT
Start: 2024-01-28

## 2024-01-30 ENCOUNTER — APPOINTMENT (OUTPATIENT)
Dept: PHYSICAL THERAPY | Facility: CLINIC | Age: 54
End: 2024-01-30
Payer: COMMERCIAL

## 2024-02-01 ENCOUNTER — EVALUATION (OUTPATIENT)
Dept: PHYSICAL THERAPY | Facility: CLINIC | Age: 54
End: 2024-02-01
Payer: COMMERCIAL

## 2024-02-01 DIAGNOSIS — M75.81 TENDINITIS OF RIGHT ROTATOR CUFF: Primary | ICD-10-CM

## 2024-02-01 PROCEDURE — 97140 MANUAL THERAPY 1/> REGIONS: CPT

## 2024-02-01 PROCEDURE — 97110 THERAPEUTIC EXERCISES: CPT

## 2024-02-01 NOTE — PROGRESS NOTES
PT Re-Evaluation     Today's date: 2024  Patient name: Joanie Dobbins  : 1970  MRN: [id number]  Referring provider: Amanda Moreno MD  Dx:   Encounter Diagnosis     ICD-10-CM    1. Tendinitis of right rotator cuff  M75.81                      Assessment  Assessment details: Joanei Dobbins is a 53 y.o. female presenting to outpatient physical therapy at [facility] with complaints of R Shoulder pain.  They present with decreased range of motion, decreased strength, limited flexibility, poor postural awareness, poor body mechanics, poor balance, decreased tolerance to activity and decreased functional mobility due to Tendinitis of right rotator cuff  (primary encounter diagnosis).  They would benefit from skilled physical therapy to address noted impairments in order to allow for full functional return to work and ADL-related activities. Thank you for the referral!    2024: Patient has made some improvements in skilled PT with her shoulder ROM, but continues to have pain with strength and resistive testing of R shoulder. Note continued TTP to anterior shoulder, decreased R shoulder ROM and strength that limits her with reaching overhead and yoga positions. She follows up with the Orthopedic Surgeon next week. Will benefit from skilled PT to address impairments and return to PLOF.   Impairments: abnormal muscle firing, abnormal or restricted ROM, activity intolerance, impaired balance, impaired physical strength, lacks appropriate home exercise program, pain with function and poor body mechanics  Barriers to therapy: n/a  Understanding of Dx/Px/POC: excellent  Goals  ST.  Independent with HEP in 2 weeks(met)  2. Decrease pain by 50% in 3 wks(not met)  3.  Increase R shoulder flexion AROM to 150 degrees in 3 weeks (met)    LT. Achieve FOTO score of 67/100 in 6 weeks (not assessed)  2.  Able to return to all yoga poses without restriction secondary to pain in 6 weeks (no change)  3.  Strength  "= 5/5 bilat shoulder flex in 6 weeks(some progress)      Plan  Plan details: RE in 6 weeks.  Patient would benefit from: skilled physical therapy  Planned modality interventions: cryotherapy, electrical stimulation/Russian stimulation and thermotherapy: hydrocollator packs  Planned therapy interventions: abdominal trunk stabilization, manual therapy, neuromuscular re-education, therapeutic activities, therapeutic exercise, body mechanics training and home exercise program  Frequency: 2x week  Plan of Care beginning date: 2/1/2024  Plan of Care expiration date: 3/14/2024  Treatment plan discussed with: patient        Subjective Evaluation    History of Present Illness  Mechanism of injury: Pt c/o R shoulder pain. Onset September 2023, 4 months ago. No known JIN. She was doing yoga and the instructor adjusted her arm. Shortly after she noticed shoulder pain. She tried injections without long term relief. She is currently getting certified as a  and her pain is keeping her from completing certain movements.     Recent R shoulder MRI shows: \"IMPRESSION:  1. Severe supraspinatus and mild infraspinatus tendinosis without discrete tear.  2. Mild subacromial/subdeltoid bursitis.  3. Moderate acromioclavicular osteoarthritis.\"    Pain is located anterior shoulder with occasional N/T radiation to the R elbow. Rated 5-8/10. Agg with reaching up or back, lifting, turning the wheel while driving. Eased with ice, hot shower. Positive for sleep disturbance secondary to pain. Denies catching, clicking, locking. Positive for R arm weakness.    Pt works as a mental health therapist; mainly sitting all day. She will hand write her notes. She practices yoga daily, about 14 hrs per week.    2/1/2024: Patient reports still having the anterior shoulder pain in her R shoulder. Certain yoga positions still bother her like having her arm straight out for certain poses. She has pain with reaching overhead. Follows up with Dr." Tiffanie next week  Patient Goals  Patient goals for therapy: decreased edema, decreased pain, improved balance, increased motion, return to work, return to sport/leisure activities, independence with ADLs/IADLs and increased strength          Objective     Observations     Additional Observation Details  Rounded shoulders    Tenderness     Additional Tenderness Details  TTP R ant delt, prox biceps, pec nancy/min, UT, LS, supraspin, infraspin, teres, lat, subscap    (-) sulcus  (+) painful arc    Cervical/Thoracic Screen   Cervical range of motion within normal limits  Thoracic range of motion within normal limits    Neurological Testing     Sensation     Shoulder   Left Shoulder   Intact: light touch    Right Shoulder   Intact: Light touch    Active Range of Motion   Left Shoulder   Flexion: 155 degrees WFL  Extension: 72 degrees WFL  Abduction: 155 degrees WFL  External rotation 0°: 66 degrees WFL  External rotation BTH: T3 WFL  Internal rotation BTB: T3 WFL    Right Shoulder   Flexion: 165 degrees with pain  Extension: 65 degrees with pain  Abduction: 160 degrees with pain  External rotation 0°: 60 degrees   External rotation BTH: T3 with pain  Internal rotation BTB: T7 with pain    Strength/Myotome Testing     Left Shoulder     Planes of Motion   Flexion: 4+   Extension: 5   Abduction: 5   External rotation at 0°: 4+   Internal rotation at 0°: 4+     Isolated Muscles   Biceps: 5   Triceps: 5     Right Shoulder     Planes of Motion   Flexion: 4+ (mild pain)   Extension: 5   Abduction: 5 (pain)   External rotation at 0°: 4+ (mild pain)   Internal rotation at 0°: 4+     Isolated Muscles   Biceps: 5   Triceps: 5              Precautions: severe R supraspin, infra tendinosis      HEP:  Access Code: [email or web address]  URL: [email or web address]  Date: 12/26/2023  Prepared by: Humera Saab    Exercises  - Standing Isometric Shoulder External Rotation with Doorway  - 10 reps - 5 sec hold  - Standing Isometric Shoulder  "Flexion with Doorway - Arm Bent  - 10 reps - 5 sec hold  - Standing Isometric Shoulder Abduction with Doorway - Arm Bent  - 10 reps - 5 sec hold    Manuals 12/26 1/4 1/9 1/11 1/16 1/18 1/23 1/26 2/1    R shoulder STM Ant delt, RC WE WE WE MB WE WE 8' 8'    R sh' inf mob GORAN            R sh' AP mob GORAN            R sh' flex iso Elbow bent 15\"x3 WE WE          R sh' ER iso 15\"x3 WE WE          R sh' abd iso In S/L, elbow bent 15\"x3 WE WE          Cervical ROM  WE WE WE  WE WE 8'     Neuro Re-Ed                                                                 Ther Ex             UBE - cardio, ROM  3'/3' 3'/3' 3'/3' 3'/3 3'/3' 3'/3' 3'/3' 3'/3    pulleys  2'/2' 2'/2' 2'/2'         Sh' flex iso 5\"x10  5\"x10          Sh' ER iso 5\"x10  5\"x10          Sh' IR iso   5\"x10          Sh' abd iso 5\"x10  5\"x10          Sh' ext iso             Bicep curl  x20 20 x20         Tricep ext    GTB  x20         Serratus punch             Prone T             Prone Y             Sh' ext  OTB  x20 GTB  x20 GTB  x20 GTB 20x GTB 20x GTB 20x GTB 20x GTB 20x    Sh' TB b/l ER   OTB  x20 OTB  x20 OTB 20x GTB 20x GTB 20x      row  OTB  x20 GTB  x20 GTB  x20 GTB 20x GTB 20x GTB 20x GTB 20x GTB 20x    TB No Moneys      GTB  x10 GTB  x10      TB Horz ABD      GTB  x10 GTB   x10      Doorway pec stretch        20\"x3 20\"x3    Seated cervical retraction     20x 20x       Seated cervical retraction with Ext      x10 x20 20x     Seated Tspine Ext over chair      10x2 10x2 15x     Supine 90/90 ER     2# 2x10        Ther Activity                                       Gait Training                                       Modalities                                              "

## 2024-02-06 ENCOUNTER — OFFICE VISIT (OUTPATIENT)
Dept: PHYSICAL THERAPY | Facility: CLINIC | Age: 54
End: 2024-02-06
Payer: COMMERCIAL

## 2024-02-06 DIAGNOSIS — M75.81 TENDINITIS OF RIGHT ROTATOR CUFF: Primary | ICD-10-CM

## 2024-02-06 PROCEDURE — 97140 MANUAL THERAPY 1/> REGIONS: CPT

## 2024-02-06 PROCEDURE — 97110 THERAPEUTIC EXERCISES: CPT

## 2024-02-06 NOTE — PROGRESS NOTES
"Daily Note     Today's date: 2024  Patient name: Joanie Dobbins  : 1970  MRN: [id number]  Referring provider: Amanda Moreno MD  Dx:   Encounter Diagnosis     ICD-10-CM    1. Tendinitis of right rotator cuff  M75.81                      Subjective: Pt reports continued pain with only brief relief after therapy.       Objective: See treatment diary below      Assessment: Tolerated treatment fair. Continues to have constant unrelenting pain in R shoulder. Still unclear as if cervical spine is involved. Trialed IASTM to anterior shoulder to assess if R shoulder soft tissue would respond positively. Patient demonstrated fatigue post treatment, exhibited good technique with therapeutic exercises, and would benefit from continued PT. She has follow up with ortho MD this week where further imaging may be recommended.       Plan: Continue per plan of care.  Progress treatment as tolerated.   Follow up on tolerance and results of IASTM trial     Precautions: severe R supraspin, infra tendinosis      HEP:  Access Code: [email or web address]  URL: [email or web address]  Date: 2023  Prepared by: Humera Saab    Exercises  - Standing Isometric Shoulder External Rotation with Doorway  - 10 reps - 5 sec hold  - Standing Isometric Shoulder Flexion with Doorway - Arm Bent  - 10 reps - 5 sec hold  - Standing Isometric Shoulder Abduction with Doorway - Arm Bent  - 10 reps - 5 sec hold    Manuals    R shoulder STM Ant delt, RC WE WE WE MB WE WE 8' 8' 5'   IASTM to R shoulder          5'   R sh' inf mob GORAN            R sh' AP mob GORAN            R sh' flex iso Elbow bent 15\"x3 WE WE          R sh' ER iso 15\"x3 WE WE          R sh' abd iso In S/L, elbow bent 15\"x3 WE WE          Cervical ROM  WE WE WE  WE WE 8'  10'   Neuro Re-Ed                                                                 Ther Ex             UBE - cardio, ROM  3'/3' 3'/3' 3'/3' 3'/3 3'/3' 3'/3' 3'/3' " "3'/3 3'/3'   pulleys  2'/2' 2'/2' 2'/2'         Sh' flex iso 5\"x10  5\"x10          Sh' ER iso 5\"x10  5\"x10          Sh' IR iso   5\"x10          Sh' abd iso 5\"x10  5\"x10          Sh' ext iso             Bicep curl  x20 20 x20         Tricep ext    GTB  x20         Serratus punch             Prone T             Prone Y             Sh' ext  OTB  x20 GTB  x20 GTB  x20 GTB 20x GTB 20x GTB 20x GTB 20x GTB 20x    Sh' TB b/l ER   OTB  x20 OTB  x20 OTB 20x GTB 20x GTB 20x      row  OTB  x20 GTB  x20 GTB  x20 GTB 20x GTB 20x GTB 20x GTB 20x GTB 20x    TB No Moneys      GTB  x10 GTB  x10      TB Horz ABD      GTB  x10 GTB   x10      Doorway pec stretch        20\"x3 20\"x3 20\"x3   Seated cervical retraction     20x 20x       Seated cervical retraction with Ext      x10 x20 20x  20x   Seated Tspine Ext over chair      10x2 10x2 15x  20   Supine 90/90 ER     2# 2x10        Ther Activity                                       Gait Training                                       Modalities                                              "

## 2024-02-07 ENCOUNTER — OFFICE VISIT (OUTPATIENT)
Dept: OBGYN CLINIC | Facility: CLINIC | Age: 54
End: 2024-02-07
Payer: COMMERCIAL

## 2024-02-07 VITALS
WEIGHT: 156 LBS | BODY MASS INDEX: 29.45 KG/M2 | DIASTOLIC BLOOD PRESSURE: 72 MMHG | SYSTOLIC BLOOD PRESSURE: 112 MMHG | HEIGHT: 61 IN

## 2024-02-07 DIAGNOSIS — M75.81 TENDINITIS OF RIGHT ROTATOR CUFF: Primary | ICD-10-CM

## 2024-02-07 PROCEDURE — 99214 OFFICE O/P EST MOD 30 MIN: CPT | Performed by: ORTHOPAEDIC SURGERY

## 2024-02-07 NOTE — PROGRESS NOTES
Assessment:     1. Tendinitis of right rotator cuff        Plan:     Problem List Items Addressed This Visit          Musculoskeletal and Integument    Tendinitis of right rotator cuff - Primary     Findings consistent with right shoulder tendinitis.  Discussed findings and treatment options with the patient.  Joanie continues to have significant pain despite conservative treatment with cortisone injection, physical therapy and Medrol Dosepak.  Recommend MR arthrogram of the right shoulder at this time to further evaluate the joint for a tear.  Follow-up after MR arthrogram and I will go over further treatment recommendations at that time.  All patient's questions were answered to her satisfaction.  This note is created using dictation transcription.  It may contain typographical errors, grammatical errors, improperly dictated words, background noise and other errors.         Relevant Orders    MRI arthrogram right shoulder    FL injection right shoulder (arthrogram)        Subjective:     Patient ID: Joanie Dobbins is a 53 y.o. female.  Chief Complaint:  This is a 53-year-old white female here for follow-up of right shoulder tendinitis.  She has been attending formal physical therapy as directed.  She does not feel improvement in her pain.  She continues to have increased symptoms with range of motion of her arm especially when reaching behind her.  She does feel her range of motion has improved with formal physical therapy.  She was prescribed a Medrol Dosepak last visit that provided relief only while taking it and then the pain returned.  Previously she only had about 4 days of improvement with the subacromial cortisone injection.  She does have a history of a cervical spine fusion.  She does get some relief when they do traction at physical therapy, but her symptoms continue to be directly in the shoulder.    Allergy:  Allergies   Allergen Reactions    Shellfish Allergy - Food Allergy Anaphylaxis and Other  (See Comments)     Category: Adverse Reaction;   hives diff breathing  hives diff breathing    Nsaids Other (See Comments)    Latex Hives and Rash     Category: Adverse Reaction;      Medications:  all current active meds have been reviewed  Past Medical History:  Past Medical History:   Diagnosis Date    Anxiety     CHF (congestive heart failure) (HCC)     Hypertension     Insomnia      Past Surgical History:  Past Surgical History:   Procedure Laterality Date    APPENDECTOMY      BACK SURGERY      L4-5 fusion; cervical fusion    BREAST SURGERY      CHOLECYSTECTOMY      GASTRIC BYPASS       Family History:  Family History   Problem Relation Age of Onset    Alzheimer's disease Mother     Cancer Father             Substance Abuse Neg Hx     Mental illness Neg Hx      Social History:  Social History     Substance and Sexual Activity   Alcohol Use No     Social History     Substance and Sexual Activity   Drug Use No     Social History     Tobacco Use   Smoking Status Former    Types: Cigarettes   Smokeless Tobacco Never   Tobacco Comments    quit      Review of Systems   Constitutional:  Negative for chills, fever and unexpected weight change.   HENT:  Negative for hearing loss, nosebleeds and sore throat.    Eyes:  Negative for pain, redness and visual disturbance.   Respiratory:  Negative for cough, shortness of breath and wheezing.    Cardiovascular:  Negative for chest pain, palpitations and leg swelling.   Gastrointestinal:  Negative for abdominal distention, nausea and vomiting.   Endocrine: Negative for polydipsia and polyuria.   Genitourinary:  Negative for dysuria and hematuria.   Musculoskeletal:  Positive for arthralgias (Right shoulder). Negative for joint swelling, neck pain and neck stiffness.   Skin:  Negative for rash and wound.   Neurological:  Negative for dizziness, numbness and headaches.   Psychiatric/Behavioral:  Negative for decreased concentration and suicidal ideas.         "  Objective:  BP Readings from Last 1 Encounters:   02/07/24 112/72      Wt Readings from Last 1 Encounters:   02/07/24 70.8 kg (156 lb)      BMI:   Estimated body mass index is 29.48 kg/m² as calculated from the following:    Height as of this encounter: 5' 1\" (1.549 m).    Weight as of this encounter: 70.8 kg (156 lb).  BSA:   Estimated body surface area is 1.7 meters squared as calculated from the following:    Height as of this encounter: 5' 1\" (1.549 m).    Weight as of this encounter: 70.8 kg (156 lb).   Physical Exam  Vitals and nursing note reviewed.   Constitutional:       Appearance: Normal appearance. She is well-developed.   HENT:      Head: Normocephalic and atraumatic.      Right Ear: External ear normal.      Left Ear: External ear normal.   Eyes:      Extraocular Movements: Extraocular movements intact.      Conjunctiva/sclera: Conjunctivae normal.   Pulmonary:      Effort: Pulmonary effort is normal.   Musculoskeletal:      Cervical back: Neck supple.   Skin:     General: Skin is warm and dry.   Neurological:      Mental Status: She is alert and oriented to person, place, and time.      Deep Tendon Reflexes: Reflexes are normal and symmetric.   Psychiatric:         Mood and Affect: Mood normal.         Behavior: Behavior normal.       Right Shoulder Exam     Tenderness   The patient is experiencing tenderness in the biceps tendon.    Range of Motion   The patient has normal right shoulder ROM.  Right shoulder active abduction: pain.   Right shoulder forward flexion: pain.   Right shoulder internal rotation 0 degrees: pain.     Muscle Strength   Abduction: 4/5   Internal rotation: 5/5   External rotation: 4/5   Biceps: 5/5     Tests   Mccarthy test: positive  Cross arm: negative  Impingement: positive  Drop arm: negative    Other   Erythema: absent  Sensation: normal  Pulse: present    Comments:  Pain with resistant abduction, external rotation            No new imaging.    Scribe Attestation      I,: "  Daiana Gómez PA-C am acting as a scribe while in the presence of the attending physician.:       I,:  Amanda Moreno MD personally performed the services described in this documentation    as scribed in my presence.:

## 2024-02-07 NOTE — ASSESSMENT & PLAN NOTE
Findings consistent with right shoulder tendinitis.  Discussed findings and treatment options with the patient.  Joanie continues to have significant pain despite conservative treatment with cortisone injection, physical therapy and Medrol Dosepak.  Recommend MR arthrogram of the right shoulder at this time to further evaluate the joint for a tear.  Follow-up after MR arthrogram and I will go over further treatment recommendations at that time.  All patient's questions were answered to her satisfaction.  This note is created using dictation transcription.  It may contain typographical errors, grammatical errors, improperly dictated words, background noise and other errors.

## 2024-02-08 ENCOUNTER — APPOINTMENT (OUTPATIENT)
Dept: PHYSICAL THERAPY | Facility: CLINIC | Age: 54
End: 2024-02-08

## 2024-02-09 ENCOUNTER — APPOINTMENT (OUTPATIENT)
Dept: PHYSICAL THERAPY | Facility: CLINIC | Age: 54
End: 2024-02-09

## 2024-02-13 ENCOUNTER — APPOINTMENT (OUTPATIENT)
Dept: PHYSICAL THERAPY | Facility: CLINIC | Age: 54
End: 2024-02-13

## 2024-02-20 ENCOUNTER — APPOINTMENT (OUTPATIENT)
Dept: PHYSICAL THERAPY | Facility: CLINIC | Age: 54
End: 2024-02-20

## 2024-02-23 ENCOUNTER — APPOINTMENT (OUTPATIENT)
Dept: PHYSICAL THERAPY | Facility: CLINIC | Age: 54
End: 2024-02-23

## 2024-02-27 ENCOUNTER — APPOINTMENT (OUTPATIENT)
Dept: PHYSICAL THERAPY | Facility: CLINIC | Age: 54
End: 2024-02-27

## 2024-03-05 ENCOUNTER — HOSPITAL ENCOUNTER (OUTPATIENT)
Dept: RADIOLOGY | Facility: HOSPITAL | Age: 54
Discharge: HOME | End: 2024-03-05
Admitting: RADIOLOGY
Payer: COMMERCIAL

## 2024-03-05 ENCOUNTER — HOSPITAL ENCOUNTER (OUTPATIENT)
Dept: MRI IMAGING | Facility: HOSPITAL | Age: 54
Discharge: HOME | End: 2024-03-05
Payer: COMMERCIAL

## 2024-03-05 DIAGNOSIS — M75.81 TENDINITIS OF RIGHT ROTATOR CUFF: ICD-10-CM

## 2024-03-05 PROCEDURE — G1004 CDSM NDSC: HCPCS

## 2024-03-05 PROCEDURE — 77002 NEEDLE LOCALIZATION BY XRAY: CPT

## 2024-03-05 PROCEDURE — 73222 MRI JOINT UPR EXTREM W/DYE: CPT

## 2024-03-05 PROCEDURE — 23350 INJECTION FOR SHOULDER X-RAY: CPT

## 2024-03-05 PROCEDURE — A9585 GADOBUTROL INJECTION: HCPCS | Performed by: STUDENT IN AN ORGANIZED HEALTH CARE EDUCATION/TRAINING PROGRAM

## 2024-03-05 RX ORDER — LIDOCAINE HYDROCHLORIDE 10 MG/ML
5 INJECTION, SOLUTION EPIDURAL; INFILTRATION; INTRACAUDAL; PERINEURAL
Status: COMPLETED | OUTPATIENT
Start: 2024-03-05 | End: 2024-03-05

## 2024-03-05 RX ORDER — ROPIVACAINE HYDROCHLORIDE 2 MG/ML
10 INJECTION, SOLUTION EPIDURAL; INFILTRATION; PERINEURAL ONCE
Status: COMPLETED | OUTPATIENT
Start: 2024-03-05 | End: 2024-03-05

## 2024-03-05 RX ORDER — GADOBUTROL 604.72 MG/ML
2 INJECTION INTRAVENOUS
Status: COMPLETED | OUTPATIENT
Start: 2024-03-05 | End: 2024-03-05

## 2024-03-05 RX ADMIN — GADOBUTROL 1 ML: 604.72 INJECTION INTRAVENOUS at 10:54

## 2024-03-05 RX ADMIN — IOHEXOL 2 ML: 300 INJECTION, SOLUTION INTRAVENOUS at 10:54

## 2024-03-05 RX ADMIN — LIDOCAINE HYDROCHLORIDE 5 ML: 10 INJECTION, SOLUTION EPIDURAL; INFILTRATION; INTRACAUDAL; PERINEURAL at 11:49

## 2024-03-05 RX ADMIN — ROPIVACAINE HYDROCHLORIDE 2 ML: 2 INJECTION, SOLUTION EPIDURAL; INFILTRATION at 10:54

## 2024-03-06 DIAGNOSIS — G47.00 INSOMNIA, UNSPECIFIED TYPE: ICD-10-CM

## 2024-03-06 DIAGNOSIS — F32.A DEPRESSION, UNSPECIFIED DEPRESSION TYPE: ICD-10-CM

## 2024-03-06 DIAGNOSIS — F98.8 ATTENTION DEFICIT DISORDER, UNSPECIFIED HYPERACTIVITY PRESENCE: ICD-10-CM

## 2024-03-06 DIAGNOSIS — F48.9 MENTAL HEALTH PROBLEM: ICD-10-CM

## 2024-03-06 DIAGNOSIS — F41.9 ANXIETY: ICD-10-CM

## 2024-03-06 RX ORDER — DEXTROAMPHETAMINE SACCHARATE, AMPHETAMINE ASPARTATE, DEXTROAMPHETAMINE SULFATE AND AMPHETAMINE SULFATE 5; 5; 5; 5 MG/1; MG/1; MG/1; MG/1
20 TABLET ORAL
Qty: 60 TABLET | Refills: 0 | Status: SHIPPED | OUTPATIENT
Start: 2024-03-06

## 2024-03-06 RX ORDER — CLONAZEPAM 0.5 MG/1
0.5 TABLET ORAL 2 TIMES DAILY PRN
Qty: 30 TABLET | Refills: 0 | Status: SHIPPED | OUTPATIENT
Start: 2024-03-06

## 2024-03-06 RX ORDER — QUETIAPINE FUMARATE 50 MG/1
50 TABLET, FILM COATED ORAL
Qty: 90 TABLET | Refills: 0 | Status: SHIPPED | OUTPATIENT
Start: 2024-03-06

## 2024-03-12 DIAGNOSIS — G47.00 INSOMNIA, UNSPECIFIED TYPE: ICD-10-CM

## 2024-03-13 RX ORDER — ZOLPIDEM TARTRATE 12.5 MG/1
12.5 TABLET, FILM COATED, EXTENDED RELEASE ORAL
Qty: 90 TABLET | Refills: 0 | Status: SHIPPED | OUTPATIENT
Start: 2024-03-13

## 2024-03-20 ENCOUNTER — TELEPHONE (OUTPATIENT)
Dept: OBGYN CLINIC | Facility: CLINIC | Age: 54
End: 2024-03-20

## 2024-03-20 ENCOUNTER — TELEPHONE (OUTPATIENT)
Age: 54
End: 2024-03-20

## 2024-03-20 NOTE — PROGRESS NOTES
[facility] PRE-OPERATIVE EVALUATION  [facility] - [facility] [address]    NAME: Joanie Dobbins  AGE: 53 y.o. SEX: female  : 1970   DATE: 3/21/2024    History of Present Illness:     Joanie Dobbins is a 53 y.o. female who presents to the office today for a preoperative consultation at the request of surgeon, Dr. Naveen Kearns, who plans on performing Right shoulder arthroscopic Rotator Cuff Repair on 24. Planned anesthesia is general. Patient has a bleeding risk of: no recent abnormal bleeding, no remote history of abnormal bleeding, on Procardia.  Patient does not have objections to receiving blood products if needed. Current anti-platelet/anti-coagulation medications that the patient is prescribed includes:  N/A .      Assessment of Chronic Conditions:   - Hypertension: well controlled on Maxide 37.5/25mg daily and procadia 60mg daily .  Has not used lasix since the summer.   -ADD:  managed with Adderall 20mg BID.  Rare use of clonazepam     Assessment of Cardiac Risk:  Denies unstable or severe angina or MI in the last 6 weeks or history of stent placement in the last year   Denies decompensated heart failure (e.g. New onset heart failure, NYHA functional class IV heart failure, or worsening existing heart failure)  Denies significant arrhythmias such as high grade AV block, symptomatic ventricular arrhythmia, newly recognized ventricular tachycardia, supraventricular tachycardia with resting heart rate >100, or symptomatic bradycardia  Denies severe heart valve disease including aortic stenosis or symptomatic mitral stenosis     Exercise Capacity:  Able to walk 4 blocks without symptoms?: Yes  Able to walk 2 flights without symptoms?: Yes    Prior Anesthesia Reactions: No  Personal history of venous thromboembolic disease? No  History of steroid use for >2 weeks within last year? No          Review of Systems:     Review of Systems   Constitutional:  Negative.  Negative for activity change, appetite change, chills, fatigue and fever.   HENT: Negative.  Negative for congestion, ear pain, postnasal drip and sinus pain.    Eyes: Negative.    Respiratory: Negative.  Negative for cough and shortness of breath.    Cardiovascular: Negative.  Negative for chest pain and leg swelling.   Gastrointestinal: Negative.  Negative for constipation and diarrhea.   Endocrine: Negative.    Genitourinary: Negative.  Negative for dysuria.   Musculoskeletal:  Positive for arthralgias.   Skin: Negative.    Allergic/Immunologic: Negative.  Negative for immunocompromised state.   Neurological: Negative.  Negative for dizziness and light-headedness.   Hematological: Negative.    Psychiatric/Behavioral: Negative.         Current Problem List:     Patient Active Problem List   Diagnosis    Benign essential HTN    Insomnia, persistent    Acute lumbar radiculopathy    Fall with injury    Neck pain    Injury of coccyx    Mental health problem    History of substance abuse (HCC)    Continuous opioid dependence (HCC)    Herniated nucleus pulposus, C5-6 left    Tendinitis of right rotator cuff    ADD (attention deficit disorder) without hyperactivity       Allergies:     Allergies   Allergen Reactions    Shellfish Allergy - Food Allergy Anaphylaxis and Other (See Comments)     Category: Adverse Reaction;   hives diff breathing  hives diff breathing    Nsaids Other (See Comments)    Latex Hives and Rash     Category: Adverse Reaction;        Current Medications:       Current Outpatient Medications:     amphetamine-dextroamphetamine (ADDERALL, 20MG,) 20 mg tablet, Take 1 tablet (20 mg total) by mouth 2 (two) times a day Max Daily Amount: 40 mg, Disp: 60 tablet, Rfl: 0    clonazePAM (KlonoPIN) 0.5 mg tablet, Take 1 tablet (0.5 mg total) by mouth 2 (two) times a day as needed for anxiety, Disp: 30 tablet, Rfl: 0    furosemide (LASIX) 20 mg tablet, TAKE 1 TABLET BY MOUTH DAILY AS NEEDED (EDEMA).,  Disp: 90 tablet, Rfl: 1    Multiple Vitamins-Minerals (MULTIVITAL-M) TABS, Take 1 tablet by mouth daily, Disp: , Rfl:     naloxone (NARCAN) 4 mg/0.1 mL nasal spray, Administer 1 spray into a nostril. If no response after 2-3 minutes, give another dose in the other nostril using a new spray., Disp: 1 each, Rfl: 1    NIFEdipine (PROCARDIA XL) 60 mg 24 hr tablet, Take 1 tablet (60 mg total) by mouth daily, Disp: 90 tablet, Rfl: 0    QUEtiapine (SEROquel) 50 mg tablet, Take 1 tablet (50 mg total) by mouth daily at bedtime, Disp: 90 tablet, Rfl: 0    SUBOXONE 8-2 MG, TAKE ONE TO ONE & ONE-HALF films UNDER THE TONGUE EVERY DAY, Disp: , Rfl: 0    triamterene-hydrochlorothiazide (MAXZIDE-25) 37.5-25 mg per tablet, Take 1 tablet by mouth daily, Disp: 90 tablet, Rfl: 0    zolpidem (AMBIEN CR) 12.5 MG CR tablet, TAKE ONE TABLET BY MOUTH DAILY AT BEDTIME, Disp: 90 tablet, Rfl: 0    Past Medical History:       Past Medical History:   Diagnosis Date    Anxiety     CHF (congestive heart failure) (HCC)     Hypertension     Insomnia         Past Surgical History:   Procedure Laterality Date    APPENDECTOMY      BACK SURGERY      L4-5 fusion; cervical fusion    BREAST SURGERY      CHOLECYSTECTOMY      FL INJECTION RIGHT SHOULDER (ARTHROGRAM)  3/5/2024    GASTRIC BYPASS          Family History   Problem Relation Age of Onset    Alzheimer's disease Mother     Cancer Father             Substance Abuse Neg Hx     Mental illness Neg Hx         Social History     Socioeconomic History    Marital status: /Civil Union     Spouse name: Not on file    Number of children: Not on file    Years of education: Not on file    Highest education level: Not on file   Occupational History    Not on file   Tobacco Use    Smoking status: Former     Types: Cigarettes    Smokeless tobacco: Never    Tobacco comments:     quit    Vaping Use    Vaping status: Never Used   Substance and Sexual Activity    Alcohol use: No    Drug use: No     "Sexual activity: Yes     Partners: Male     Birth control/protection: None   Other Topics Concern    Not on file   Social History Narrative    Lives with     Feels safe at home    Sees dentist reg    No living will     Social Determinants of Health     Financial Resource Strain: Low Risk  (6/18/2020)    Overall Financial Resource Strain (CARDIA)     Difficulty of Paying Living Expenses: Not hard at all   Food Insecurity: No Food Insecurity (6/18/2020)    Hunger Vital Sign     Worried About Running Out of Food in the Last Year: Never true     Ran Out of Food in the Last Year: Never true   Transportation Needs: No Transportation Needs (6/18/2020)    PRAPARE - Transportation     Lack of Transportation (Medical): No     Lack of Transportation (Non-Medical): No   Physical Activity: Inactive (6/18/2020)    Exercise Vital Sign     Days of Exercise per Week: 0 days     Minutes of Exercise per Session: 0 min   Stress: Stress Concern Present (6/18/2020)    [employer] [place] [employer] - Occupational Stress Questionnaire     Feeling of Stress : To some extent   Social Connections: Not on file   Intimate Partner Violence: Not At Risk (6/18/2020)    Humiliation, Afraid, Rape, and Kick questionnaire     Fear of Current or Ex-Partner: No     Emotionally Abused: No     Physically Abused: No     Sexually Abused: No   Housing Stability: Not on file        Physical Exam:     /78   Pulse 70   Resp 18   Ht 5' 1\" (1.549 m)   Wt 70.3 kg (155 lb)   LMP  (LMP Unknown)   SpO2 96%   BMI 29.29 kg/m²     Physical Exam  Vitals and nursing note reviewed.   Constitutional:       Appearance: Normal appearance.   HENT:      Head: Normocephalic and atraumatic.      Right Ear: Tympanic membrane, ear canal and external ear normal.      Left Ear: Tympanic membrane, ear canal and external ear normal.      Nose: Nose normal.      Mouth/Throat:      Mouth: Mucous membranes are moist.      Pharynx: Oropharynx is clear. "   Eyes:      Extraocular Movements: Extraocular movements intact.      Conjunctiva/sclera: Conjunctivae normal.      Pupils: Pupils are equal, round, and reactive to light.   Cardiovascular:      Rate and Rhythm: Normal rate and regular rhythm.      Pulses: Normal pulses.      Heart sounds: Normal heart sounds.   Pulmonary:      Effort: Pulmonary effort is normal.      Breath sounds: Normal breath sounds.   Abdominal:      General: Bowel sounds are normal.      Palpations: Abdomen is soft.   Musculoskeletal:         General: Tenderness present.      Right shoulder: Tenderness present. Decreased range of motion.      Cervical back: Normal range of motion and neck supple.   Skin:     General: Skin is warm and dry.   Neurological:      General: No focal deficit present.      Mental Status: She is alert and oriented to person, place, and time.   Psychiatric:         Mood and Affect: Mood normal.         Behavior: Behavior normal.         Thought Content: Thought content normal.         Judgment: Judgment normal.        Data:     Pre-operative work-up    Laboratory Results: I have personally reviewed the pertinent laboratory results/reports  CBCD/ BMP 3/18/24 stable except CO2 34.  Prior results WNL within the last year.       EKG: I have personally reviewed pertinent reports.    3/21/2024 -  Sinus rhythm.      Chest x-ray:  N/A    Previous cardiopulmonary studies within the past year:  Echocardiogram: 6/10/2022: EF 65% with normal systolic function and wall motion.  G1DR. Trace MR  Cardiac Catheterization: N/A  Stress Test: N/A  Pulmonary Function Testing: N/A      Assessment & Recommendations:     1. Pre-op examination  -     POCT ECG    2. Rotator cuff tear arthropathy of right shoulder    3. Benign essential HTN  Assessment & Plan:  Well controlled with maxzide 37.5/25mg daily, procardia 60mg daily.  Has not used prn lasix since the summertime  Remains physically active with yoga, dietary adjustments, stress  reduction      4. ADD (attention deficit disorder) without hyperactivity  Assessment & Plan:  Inattentive type: managed on Adderall 20mg po BID for years.    PDMP reviewed, no red flags.      5. History of substance abuse (HCC)  Assessment & Plan:  2008 s/p back surgery, has been maintained on suboxone for years.  Managed by Dr. Dylan Vazquez and will be tapering off prior to surgery.      6. Insomnia, persistent  Assessment & Plan:  Managed with Ambien 12.5mg po qHS          Pre-Op Evaluation Assessment  53 y.o. female with planned surgery: Right shoulder arthroscopic rotator cuff repair.    Known risk factors for perioperative complications: None.    Current medications which may produce withdrawal symptoms if withheld perioperatively: suboxone, adderall, clonazepam, ambien, seroquel.      Pre-Op Evaluation Plan  1. Further preoperative workup as follows:   - None; no further preoperative work-up is required    2. Medication Management/Recommendations:   - Patient has been instructed to avoid herbs or non-directed vitamins the week prior to surgery to ensure no drug interactions with perioperative surgical and anesthetic medications.  - Patient should hold diuretic medication the day of surgery.  - Patient has been instructed to avoid aspirin containing medications or non-steroidal anti-inflammatory drugs for the week preceding surgery.  - Hold the following sedative medications 24 hours preoperatively: Ms. Dobbins is instructed to inquire with Dr. Kearns regarding her Ambien, suboxone, clonazepam, adderrall dosing prior to surgery    3. Prophylaxis for cardiac events with perioperative beta-blockers: not indicated.    4. Patient requires further consultation with: None    Clearance  Patient is CLEARED for surgery without any additional cardiac testing.     CHRISTOS Esparza  [facility] CARE [address] [address]  Phone#  [phone]  Fax#   [phone]

## 2024-03-20 NOTE — TELEPHONE ENCOUNTER
Called patient back and patient expressed concern about how she went for a second surgical consult and the other surgeon read the first MRI and said that there is a tear shown in the MRI and that Dr. Moreno told her that there was no tear in the initial MRI. I did explain that a radiologist does read the results of the MRI and puts the findings of the results in the report. Patient said that she just wanted to bring this to someone's attention and that she is scheduled elsewhere.

## 2024-03-21 ENCOUNTER — OFFICE VISIT (OUTPATIENT)
Dept: FAMILY MEDICINE CLINIC | Facility: HOSPITAL | Age: 54
End: 2024-03-21
Payer: COMMERCIAL

## 2024-03-21 VITALS
OXYGEN SATURATION: 96 % | HEIGHT: 61 IN | DIASTOLIC BLOOD PRESSURE: 78 MMHG | WEIGHT: 155 LBS | RESPIRATION RATE: 18 BRPM | HEART RATE: 70 BPM | SYSTOLIC BLOOD PRESSURE: 102 MMHG | BODY MASS INDEX: 29.27 KG/M2

## 2024-03-21 DIAGNOSIS — F19.11 HISTORY OF SUBSTANCE ABUSE (HCC): ICD-10-CM

## 2024-03-21 DIAGNOSIS — Z01.818 PRE-OP EXAMINATION: Primary | ICD-10-CM

## 2024-03-21 DIAGNOSIS — M75.101 ROTATOR CUFF TEAR ARTHROPATHY OF RIGHT SHOULDER: ICD-10-CM

## 2024-03-21 DIAGNOSIS — I10 BENIGN ESSENTIAL HTN: ICD-10-CM

## 2024-03-21 DIAGNOSIS — M12.811 ROTATOR CUFF TEAR ARTHROPATHY OF RIGHT SHOULDER: ICD-10-CM

## 2024-03-21 DIAGNOSIS — G47.00 INSOMNIA, PERSISTENT: ICD-10-CM

## 2024-03-21 DIAGNOSIS — F98.8 ADD (ATTENTION DEFICIT DISORDER) WITHOUT HYPERACTIVITY: ICD-10-CM

## 2024-03-21 LAB — VENTRICULAR RATE: 80 DEGREES

## 2024-03-21 PROCEDURE — 99214 OFFICE O/P EST MOD 30 MIN: CPT | Performed by: NURSE PRACTITIONER

## 2024-03-21 PROCEDURE — 93000 ELECTROCARDIOGRAM COMPLETE: CPT | Performed by: NURSE PRACTITIONER

## 2024-03-21 NOTE — ASSESSMENT & PLAN NOTE
Well controlled with maxzide 37.5/25mg daily, procardia 60mg daily.  Has not used prn lasix since the summertime  Remains physically active with yoga, dietary adjustments, stress reduction

## 2024-03-21 NOTE — ASSESSMENT & PLAN NOTE
2008 s/p back surgery, has been maintained on suboxone for years.  Managed by Dr. Dylan Vazquez and will be tapering off prior to surgery.

## 2024-04-05 DIAGNOSIS — F98.8 ATTENTION DEFICIT DISORDER, UNSPECIFIED HYPERACTIVITY PRESENCE: ICD-10-CM

## 2024-04-05 DIAGNOSIS — G47.00 INSOMNIA, UNSPECIFIED TYPE: ICD-10-CM

## 2024-04-06 RX ORDER — DEXTROAMPHETAMINE SACCHARATE, AMPHETAMINE ASPARTATE, DEXTROAMPHETAMINE SULFATE AND AMPHETAMINE SULFATE 5; 5; 5; 5 MG/1; MG/1; MG/1; MG/1
20 TABLET ORAL
Qty: 60 TABLET | Refills: 0 | Status: SHIPPED | OUTPATIENT
Start: 2024-04-06

## 2024-04-06 RX ORDER — ZOLPIDEM TARTRATE 12.5 MG/1
12.5 TABLET, FILM COATED, EXTENDED RELEASE ORAL
Qty: 90 TABLET | Refills: 0 | Status: SHIPPED | OUTPATIENT
Start: 2024-04-06

## 2024-04-27 DIAGNOSIS — I15.9 SECONDARY HYPERTENSION: ICD-10-CM

## 2024-04-28 RX ORDER — NIFEDIPINE 60 MG/1
60 TABLET, EXTENDED RELEASE ORAL DAILY
Qty: 90 TABLET | Refills: 1 | Status: SHIPPED | OUTPATIENT
Start: 2024-04-28 | End: 2024-04-29 | Stop reason: SDUPTHER

## 2024-04-29 DIAGNOSIS — F98.8 ATTENTION DEFICIT DISORDER, UNSPECIFIED HYPERACTIVITY PRESENCE: ICD-10-CM

## 2024-04-29 DIAGNOSIS — F48.9 MENTAL HEALTH PROBLEM: ICD-10-CM

## 2024-04-29 DIAGNOSIS — G47.00 INSOMNIA, UNSPECIFIED TYPE: ICD-10-CM

## 2024-04-29 DIAGNOSIS — I15.9 SECONDARY HYPERTENSION: ICD-10-CM

## 2024-04-29 DIAGNOSIS — F41.9 ANXIETY: ICD-10-CM

## 2024-04-29 DIAGNOSIS — F32.A DEPRESSION, UNSPECIFIED DEPRESSION TYPE: ICD-10-CM

## 2024-04-29 RX ORDER — ZOLPIDEM TARTRATE 12.5 MG/1
12.5 TABLET, FILM COATED, EXTENDED RELEASE ORAL
Qty: 90 TABLET | Refills: 0 | Status: SHIPPED | OUTPATIENT
Start: 2024-04-29

## 2024-04-29 RX ORDER — QUETIAPINE FUMARATE 50 MG/1
50 TABLET, FILM COATED ORAL
Qty: 90 TABLET | Refills: 1 | Status: SHIPPED | OUTPATIENT
Start: 2024-04-29

## 2024-04-29 RX ORDER — CLONAZEPAM 0.5 MG/1
0.5 TABLET ORAL 2 TIMES DAILY PRN
Qty: 30 TABLET | Refills: 0 | Status: SHIPPED | OUTPATIENT
Start: 2024-04-29

## 2024-04-29 RX ORDER — NIFEDIPINE 60 MG/1
60 TABLET, EXTENDED RELEASE ORAL DAILY
Qty: 90 TABLET | Refills: 1 | Status: SHIPPED | OUTPATIENT
Start: 2024-04-29

## 2024-04-29 RX ORDER — DEXTROAMPHETAMINE SACCHARATE, AMPHETAMINE ASPARTATE, DEXTROAMPHETAMINE SULFATE AND AMPHETAMINE SULFATE 5; 5; 5; 5 MG/1; MG/1; MG/1; MG/1
20 TABLET ORAL
Qty: 60 TABLET | Refills: 0 | Status: SHIPPED | OUTPATIENT
Start: 2024-04-29

## 2024-04-29 RX ORDER — TRIAMTERENE AND HYDROCHLOROTHIAZIDE 37.5; 25 MG/1; MG/1
1 TABLET ORAL DAILY
Qty: 90 TABLET | Refills: 1 | Status: SHIPPED | OUTPATIENT
Start: 2024-04-29

## 2024-05-14 ENCOUNTER — EVALUATION (OUTPATIENT)
Dept: PHYSICAL THERAPY | Facility: CLINIC | Age: 54
End: 2024-05-14
Payer: COMMERCIAL

## 2024-05-14 DIAGNOSIS — S43.421D SPRAIN OF RIGHT ROTATOR CUFF CAPSULE, SUBSEQUENT ENCOUNTER: Primary | ICD-10-CM

## 2024-05-14 PROCEDURE — 97110 THERAPEUTIC EXERCISES: CPT

## 2024-05-14 PROCEDURE — 97161 PT EVAL LOW COMPLEX 20 MIN: CPT

## 2024-05-14 NOTE — LETTER
May 14, 2024    Moise Barr MD  [address] [facility] [address]    Patient: Joanie Dobbins   YOB: 1970   Date of Visit: 2024     Encounter Diagnosis     ICD-10-CM    1. Sprain of right rotator cuff capsule, subsequent encounter  S43.421D           Dear Dr. Barr:    Thank you for your recent referral of Joanie Dobbins. Please review the attached evaluation summary from Jonaie's recent visit.     Please verify that you agree with the plan of care by signing the attached order.     If you have any questions or concerns, please do not hesitate to call.     I sincerely appreciate the opportunity to share in the care of one of your patients and hope to have another opportunity to work with you in the near future.       Sincerely,    Adiel Rudd      Referring Provider:      I certify that I have read the below Plan of Care and certify the need for these services furnished under this plan of treatment while under my care.                    Moise Barr MD  [address] [facility]  [place] [address]  Via Fax: [phone]          PT Evaluation     Today's date: 2024  Patient name: Joanie Dobbins  : 1970  MRN: [id number]  Referring provider: Moise Barr MD  Dx:   Encounter Diagnosis     ICD-10-CM    1. Sprain of right rotator cuff capsule, subsequent encounter  S43.421D                      Assessment  Assessment details: Joanie Dobbins is a pleasant 53 y.o. female who presents 6 weeks s/p R RTC repair on 2024. She presents with decreased R shoulder ROM. R shoulder strength not tested today secondary to surgery 6 weeks ago - will test when appropriate. These impairments and precautions following surgery are limiting her with dressing, doing her hair, typing at the computer, reaching overhead, yoga, and household chores. These signs and symptoms are consistent with RTC repair. She will benefit from skilled PT to address impairments and return to  PLOF. No referral necessary at this time.        Impairments: abnormal or restricted ROM, activity intolerance, impaired physical strength, lacks appropriate home exercise program and pain with function  Prognosis details: Positive prognostic indicators include positive attitude toward recovery, motivated to improve, high self-efficacy, good understanding of condition, realistic expectations.  Negative prognostic indicators include chronicity of symptoms, high symptom irritability    Goals  STG to be achieved in 6 weeks  Patient will have improved R shoulder ROM in all planes to WNLs (180 deg abd and flex, 90 deg ER)  Patient will have improved gross R shoulder strength to 5/5  Able to dress without difficulty  Patient will be independent with HEP.    LTG to be achieved in 12 weeks  Patient will be able to resume yoga with no restrictions  Patient will be able to perform all household chores and ADLs      Plan  Patient would benefit from: skilled physical therapy  Planned modality interventions: cryotherapy and thermotherapy: hydrocollator packs  Planned therapy interventions: home exercise program, functional ROM exercises, body mechanics training, joint mobilization, manual therapy, neuromuscular re-education, therapeutic exercise, therapeutic activities, stretching, strengthening, patient education and flexibility  Frequency: 1-2x/week.  Plan of Care beginning date: 5/14/2024  Plan of Care expiration date: 8/6/2024  Treatment plan discussed with: patient        Subjective Evaluation    History of Present Illness  Date of surgery: 4/2/2024  Mechanism of injury: Patient has had chronic R shoulder pain - she did PT prior to surgery but this did not help. She had R RTC repair on 4/2/2024. She stopped wearing the sling at 3 weeks post op. She has difficulty pulling up her pants, typing at the computer for long periods of time, dressing, doing her hair, household chores, and fully participating in yoga.     She  returned to work a few days after the surgery.     Follow up with Dr. Barr on .   Patient Goals  Patient goal: Patient would like to return to yoga without limitations, perform ADLs.  Pain  Current pain ratin  At best pain ratin  At worst pain ratin  Relieving factors: ice          Objective     Active Range of Motion   Left Shoulder   Normal active range of motion    Right Shoulder   Flexion: 90 degrees     Additional Active Range of Motion Details  Flexion AROM limited to 90 secondary to protocol    Passive Range of Motion   Left Shoulder   Normal passive range of motion    Right Shoulder   Flexion: 160 degrees with pain  Abduction: 120 degrees with pain  External rotation 0°: WFL and with pain  External rotation 45°: WFL and with pain    Additional Passive Range of Motion Details  Empty endfeel during PROM due to pain    Strength/Myotome Testing     Left Shoulder   Normal muscle strength    Additional Strength Details  R shoulder strength not tested secondary to RTC repair 6 weeks ago               DOS: 2024; R RTC repair.   Precautions: AROM shoulder ROM to 90 deg; AAROM and PROM within tolerance, periscap exercises; 1 pound weight resitriction  Functional Limitations: dressing, doing her hair, typing at a computer for long periods of time, yoga, household chores  Impairments: R shoulder ROM; R shoulder strength not tested today  [employer] Code: [email or web address]  POC expiration: 2024      Manuals             R shoulder PROM   No IR until week of                                                     Neuro Re-Ed             TB row             TB shld ext                          [place] ball on wall                          Weight shifts EOT ?                         Ther Ex             Pulleys flex and scap nv            Supine cane flex HEP            Supine cane ER nv            Wall slides             Bent over row HEP 2x10            Bent over shld ext HEP 2x10                          Standing shld flex to 90 5/28            Standing shld abd to 90 5/28                                      Ther Activity                                       Gait Training                                       Modalities

## 2024-05-14 NOTE — PROGRESS NOTES
PT Evaluation     Today's date: 2024  Patient name: Joanie Dobbins  : 1970  MRN: [id number]  Referring provider: Moise Barr MD  Dx:   Encounter Diagnosis     ICD-10-CM    1. Sprain of right rotator cuff capsule, subsequent encounter  S43.421D                      Assessment  Assessment details: Joanie Dobbins is a pleasant 53 y.o. female who presents 6 weeks s/p R RTC repair on 2024. She presents with decreased R shoulder ROM. R shoulder strength not tested today secondary to surgery 6 weeks ago - will test when appropriate. These impairments and precautions following surgery are limiting her with dressing, doing her hair, typing at the computer, reaching overhead, yoga, and household chores. These signs and symptoms are consistent with RTC repair. She will benefit from skilled PT to address impairments and return to PLOF. No referral necessary at this time.        Impairments: abnormal or restricted ROM, activity intolerance, impaired physical strength, lacks appropriate home exercise program and pain with function  Prognosis details: Positive prognostic indicators include positive attitude toward recovery, motivated to improve, high self-efficacy, good understanding of condition, realistic expectations.  Negative prognostic indicators include chronicity of symptoms, high symptom irritability    Goals  STG to be achieved in 6 weeks  Patient will have improved R shoulder ROM in all planes to WNLs (180 deg abd and flex, 90 deg ER)  Patient will have improved gross R shoulder strength to 5/5  Able to dress without difficulty  Patient will be independent with HEP.    LTG to be achieved in 12 weeks  Patient will be able to resume yoga with no restrictions  Patient will be able to perform all household chores and ADLs      Plan  Patient would benefit from: skilled physical therapy  Planned modality interventions: cryotherapy and thermotherapy: hydrocollator packs  Planned therapy interventions:  home exercise program, functional ROM exercises, body mechanics training, joint mobilization, manual therapy, neuromuscular re-education, therapeutic exercise, therapeutic activities, stretching, strengthening, patient education and flexibility  Frequency: 1-2x/week.  Plan of Care beginning date: 2024  Plan of Care expiration date: 2024  Treatment plan discussed with: patient        Subjective Evaluation    History of Present Illness  Date of surgery: 2024  Mechanism of injury: Patient has had chronic R shoulder pain - she did PT prior to surgery but this did not help. She had R RTC repair on 2024. She stopped wearing the sling at 3 weeks post op. She has difficulty pulling up her pants, typing at the computer for long periods of time, dressing, doing her hair, household chores, and fully participating in yoga.     She returned to work a few days after the surgery.     Follow up with Dr. Barr on .   Patient Goals  Patient goal: Patient would like to return to yoga without limitations, perform ADLs.  Pain  Current pain ratin  At best pain ratin  At worst pain ratin  Relieving factors: ice          Objective     Active Range of Motion   Left Shoulder   Normal active range of motion    Right Shoulder   Flexion: 90 degrees     Additional Active Range of Motion Details  Flexion AROM limited to 90 secondary to protocol    Passive Range of Motion   Left Shoulder   Normal passive range of motion    Right Shoulder   Flexion: 160 degrees with pain  Abduction: 120 degrees with pain  External rotation 0°: WFL and with pain  External rotation 45°: WFL and with pain    Additional Passive Range of Motion Details  Empty endfeel during PROM due to pain    Strength/Myotome Testing     Left Shoulder   Normal muscle strength    Additional Strength Details  R shoulder strength not tested secondary to RTC repair 6 weeks ago               DOS: 2024; R RTC repair.   Precautions: AROM shoulder ROM  to 90 deg; AAROM and PROM within tolerance, periscap exercises; 1 pound weight resitriction  Functional Limitations: dressing, doing her hair, typing at a computer for long periods of time, yoga, household chores  Impairments: R shoulder ROM; R shoulder strength not tested today  [employer] Code: [email or web address]  POC expiration: 8/6/2024      Manuals 5/14            R shoulder PROM   No IR until week of 5/28                                                    Neuro Re-Ed             TB row             TB shld ext                          [place] ball on wall                          Weight shifts EOT 5/28?                         Ther Ex             Pulleys flex and scap nv            Supine cane flex HEP            Supine cane ER nv            Wall slides 5/28            Bent over row HEP 2x10            Bent over shld ext HEP 2x10                         Standing shld flex to 90 5/28            Standing shld abd to 90 5/28                                      Ther Activity                                       Gait Training                                       Modalities

## 2024-05-15 DIAGNOSIS — F48.9 MENTAL HEALTH PROBLEM: ICD-10-CM

## 2024-05-16 RX ORDER — QUETIAPINE FUMARATE 50 MG/1
50 TABLET, FILM COATED ORAL
Qty: 90 TABLET | Refills: 0 | Status: SHIPPED | OUTPATIENT
Start: 2024-05-16

## 2024-05-21 ENCOUNTER — OFFICE VISIT (OUTPATIENT)
Dept: PHYSICAL THERAPY | Facility: CLINIC | Age: 54
End: 2024-05-21
Payer: COMMERCIAL

## 2024-05-21 DIAGNOSIS — S43.421D SPRAIN OF RIGHT ROTATOR CUFF CAPSULE, SUBSEQUENT ENCOUNTER: Primary | ICD-10-CM

## 2024-05-21 PROCEDURE — 97140 MANUAL THERAPY 1/> REGIONS: CPT

## 2024-05-21 PROCEDURE — 97112 NEUROMUSCULAR REEDUCATION: CPT

## 2024-05-21 PROCEDURE — 97110 THERAPEUTIC EXERCISES: CPT

## 2024-05-21 NOTE — PROGRESS NOTES
"Daily Note     Today's date: 2024  Patient name: Joanie Dobbins  : 1970  MRN: [id number]  Referring provider: Moise Barr MD  Dx:   Encounter Diagnosis     ICD-10-CM    1. Sprain of right rotator cuff capsule, subsequent encounter  S43.421D                      Subjective: Patient notes exercises are going well at home. Notes soreness primarily in anterior shoulder      Objective: See treatment diary below      Assessment: Patient tolerated treatment well overall. She is primarily limited with abduction PROM and endrange flexion and ER PROM. Progress AAROM next visit and begin AROM if tolerated above 90 degrees. She will benefit from skilled PT to address impairments and return to PLOF.       Plan: progress per plan in treatment diary to AROM; assess response to treatment     DOS: 2024; R RTC repair.   Precautions: AROM shoulder ROM to 90 deg; AAROM and PROM within tolerance, periscap exercises; 1 pound weight resitriction  Functional Limitations: dressing, doing her hair, typing at a computer for long periods of time, yoga, household chores  Impairments: R shoulder ROM; R shoulder strength not tested today  [employer] Code: [email or web address]  POC expiration: 2024      Manuals            R shoulder PROM   No IR until week of   10'           STM to anterior R shoulder and proximal bicep  5'                                     Neuro Re-Ed             TB row  OTB 2x10           TB shld ext  OTB 2x10                        [place] ball on wall                          Weight shifts EOT ? nv                        Ther Ex             Pulleys flex and scap nv Scap 2'           Supine cane flex HEP 5\"x10           Supine cane ER nv 5\"x10           Wall slides  nv           Bent over row HEP 2x10 2x10           Bent over shld ext HEP 2x10 2x10                        Standing shld flex to 90  nv           Standing shld abd to 90  nv                                     Ther " Activity                                       Gait Training                                       Modalities

## 2024-05-27 DIAGNOSIS — F98.8 ATTENTION DEFICIT DISORDER, UNSPECIFIED HYPERACTIVITY PRESENCE: ICD-10-CM

## 2024-05-27 DIAGNOSIS — F32.A DEPRESSION, UNSPECIFIED DEPRESSION TYPE: ICD-10-CM

## 2024-05-27 DIAGNOSIS — F41.9 ANXIETY: ICD-10-CM

## 2024-05-27 DIAGNOSIS — F48.9 MENTAL HEALTH PROBLEM: ICD-10-CM

## 2024-05-27 DIAGNOSIS — G47.00 INSOMNIA, UNSPECIFIED TYPE: ICD-10-CM

## 2024-05-30 RX ORDER — QUETIAPINE FUMARATE 50 MG/1
50 TABLET, FILM COATED ORAL
Qty: 90 TABLET | Refills: 0 | Status: SHIPPED | OUTPATIENT
Start: 2024-05-30

## 2024-05-30 RX ORDER — ZOLPIDEM TARTRATE 12.5 MG/1
12.5 TABLET, FILM COATED, EXTENDED RELEASE ORAL
Qty: 90 TABLET | Refills: 0 | Status: SHIPPED | OUTPATIENT
Start: 2024-05-30

## 2024-05-30 RX ORDER — DEXTROAMPHETAMINE SACCHARATE, AMPHETAMINE ASPARTATE, DEXTROAMPHETAMINE SULFATE AND AMPHETAMINE SULFATE 5; 5; 5; 5 MG/1; MG/1; MG/1; MG/1
20 TABLET ORAL
Qty: 60 TABLET | Refills: 0 | Status: SHIPPED | OUTPATIENT
Start: 2024-05-30

## 2024-05-30 RX ORDER — CLONAZEPAM 0.5 MG/1
0.5 TABLET ORAL 2 TIMES DAILY PRN
Qty: 30 TABLET | Refills: 0 | Status: SHIPPED | OUTPATIENT
Start: 2024-05-30

## 2024-05-31 ENCOUNTER — OFFICE VISIT (OUTPATIENT)
Dept: PHYSICAL THERAPY | Facility: CLINIC | Age: 54
End: 2024-05-31
Payer: COMMERCIAL

## 2024-05-31 DIAGNOSIS — S43.421D SPRAIN OF RIGHT ROTATOR CUFF CAPSULE, SUBSEQUENT ENCOUNTER: Primary | ICD-10-CM

## 2024-05-31 PROCEDURE — 97112 NEUROMUSCULAR REEDUCATION: CPT

## 2024-05-31 PROCEDURE — 97110 THERAPEUTIC EXERCISES: CPT

## 2024-05-31 PROCEDURE — 97140 MANUAL THERAPY 1/> REGIONS: CPT

## 2024-05-31 NOTE — PROGRESS NOTES
"Daily Note     Today's date: 2024  Patient name: Joanie Dobbins  : 1970  MRN: [id number]  Referring provider: Moise Barr MD  Dx:   Encounter Diagnosis     ICD-10-CM    1. Sprain of right rotator cuff capsule, subsequent encounter  S43.421D                      Subjective: Patient notes overall doing well. Notes exercises are getting easier.       Objective: See treatment diary below      Assessment: Patient tolerated treatment fair overall. Noted most discomfort during abduction PROM and ER PROM at endrange - ROM will continue to progress well. Added AROM and AAROM above 90 degrees today - soreness with active flexion and abduction. Will benefit from skilled PT to progress through RTC protocol and return to PLOF      Plan: progress per protocol     DOS: 2024; R RTC repair.   Precautions: AROM shoulder ROM as tolerated; AAROM and PROM within tolerance, periscap exercises; 1 pound weight resitriction; no lifting above 90 degrees  Functional Limitations: dressing, doing her hair, typing at a computer for long periods of time, yoga, household chores  Impairments: R shoulder ROM; R shoulder strength not tested today  [employer] Code: [email or web address]  POC expiration: 2024      Manuals           R shoulder PROM   No IR until week of   10' 10' begin IR nv          STM to anterior R shoulder and proximal bicep  5' 4'                                    Neuro Re-Ed             TB row  OTB 2x10 GTB 2x10          TB shld ext  OTB 2x10 GTB 2x10                       [place] ball on wall   Nv?                       Weight shifts EOT ? nv nv          EOT push up plus             Ther Ex             Pulleys flex and scap nv Scap 2' Scap 2'          Supine cane flex HEP 5\"x10 5\"x10          Supine cane ER nv 5\"x10 5\"x10          Wall slides  nv 5\"x10 HEP          Bent over row HEP 2x10 2x10           Bent over shld ext HEP 2x10 2x10                        Standing shld flex to 90  nv " 2x10          Standing shld abd to 90 5/28 nv 2x10                                    Ther Activity                                       Gait Training                                       Modalities

## 2024-06-04 ENCOUNTER — OFFICE VISIT (OUTPATIENT)
Dept: PHYSICAL THERAPY | Facility: CLINIC | Age: 54
End: 2024-06-04
Payer: COMMERCIAL

## 2024-06-04 DIAGNOSIS — S43.421D SPRAIN OF RIGHT ROTATOR CUFF CAPSULE, SUBSEQUENT ENCOUNTER: Primary | ICD-10-CM

## 2024-06-04 PROCEDURE — 97140 MANUAL THERAPY 1/> REGIONS: CPT

## 2024-06-04 PROCEDURE — 97110 THERAPEUTIC EXERCISES: CPT

## 2024-06-04 PROCEDURE — 97112 NEUROMUSCULAR REEDUCATION: CPT

## 2024-06-04 NOTE — PROGRESS NOTES
"Daily Note     Today's date: 2024  Patient name: Joanie Dobbins  : 1970  MRN: [id number]  Referring provider: Moise Barr MD  Dx:   Encounter Diagnosis     ICD-10-CM    1. Sprain of right rotator cuff capsule, subsequent encounter  S43.421D                      Subjective: Patient reports she has been doing good overall.       Objective: See treatment diary below      Assessment: Patient tolerated treatment fair overall. Has continued to progress very well. Added IR stretch today with no pain and no restriction. Some discomfort with eccentric lowering from flexion but improved with more reps. Will benefit from skilled PT to progress through RTC protocol and return to PLOF      Plan: progress per protocol     DOS: 2024; R RTC repair.   Precautions: AROM shoulder ROM as tolerated; AAROM and PROM within tolerance, periscap exercises; 1 pound weight resitriction; no lifting above 90 degrees  Functional Limitations: dressing, doing her hair, typing at a computer for long periods of time, yoga, household chores  Impairments: R shoulder ROM; R shoulder strength not tested today  [employer] Code: [email or web address]  POC expiration: 2024      Manuals  6         R shoulder PROM   No IR until week of   10' 10' begin IR nv 10'         STM to anterior R shoulder and proximal bicep  5' 4' 4'                                   Neuro Re-Ed             TB row  OTB 2x10 GTB 2x10 GTB 2x10         TB shld ext  OTB 2x10 GTB 2x10 GTB 2x10                      [place] ball on wall   Nv? X20  Cw/ccw                      Weight shifts EOT ? nv nv x20         EOT push up plus             Ther Ex             Pulleys flex and scap nv Scap 2' Scap 2' Scap 2'         Supine cane flex HEP 5\"x10 5\"x10 5\"x10         Supine cane ER nv 5\"x10 5\"x10 5\"x10         Wall slides  nv 5\"x10 HEP          Bent over row HEP 2x10 2x10  10x2         Bent over shld ext HEP 2x10 2x10  10x2                      Standing shld " flex to 90 5/28 nv 2x10 2x10         Standing shld abd to 90 5/28 nv 2x10 2x10                                   Ther Activity                                       Gait Training                                       Modalities

## 2024-06-10 ENCOUNTER — OFFICE VISIT (OUTPATIENT)
Dept: PHYSICAL THERAPY | Facility: CLINIC | Age: 54
End: 2024-06-10
Payer: COMMERCIAL

## 2024-06-10 DIAGNOSIS — S43.421D SPRAIN OF RIGHT ROTATOR CUFF CAPSULE, SUBSEQUENT ENCOUNTER: Primary | ICD-10-CM

## 2024-06-10 PROCEDURE — 97112 NEUROMUSCULAR REEDUCATION: CPT

## 2024-06-10 PROCEDURE — 97110 THERAPEUTIC EXERCISES: CPT

## 2024-06-10 PROCEDURE — 97140 MANUAL THERAPY 1/> REGIONS: CPT

## 2024-06-10 NOTE — PROGRESS NOTES
"Daily Note     Today's date: 6/10/2024  Patient name: Joanie Dobbins  : 1970  MRN: [id number]  Referring provider: Moise Barr MD  Dx:   Encounter Diagnosis     ICD-10-CM    1. Sprain of right rotator cuff capsule, subsequent encounter  S43.421D                      Subjective: Patient notes she did planks in yoga - had some soreness but overall it went well. She is able to do warrior poses and other positions involving the arms out at 90 degrees.       Objective: See treatment diary below      Assessment: Patient tolerated treatment well. Continued soreness in R bicep that is relieved with STM. Progressed shoulder AROM today. Will benefit from skilled PT to continue progressing AROM and strength as appropriate per protocol to return to PLOF      Plan: progress per protocol     DOS: 2024; R RTC repair.   Precautions: AROM shoulder ROM as tolerated; AAROM and PROM within tolerance, periscap exercises; 1 pound weight resitriction; no lifting above 90 degrees  Functional Limitations: dressing, doing her hair, typing at a computer for long periods of time, yoga, household chores  Impairments: R shoulder ROM; R shoulder strength not tested today  [employer] Code: [email or web address]  POC expiration: 2024      Manuals 5/14 5/21 5/31 6/4 6/10        R shoulder PROM   No IR until week of   10' 10' begin IR nv 10' 8'        STM to anterior R shoulder and proximal bicep  5' 4' 4' 4'                                  Neuro Re-Ed             TB row  OTB 2x10 GTB 2x10 GTB 2x10         TB shld ext  OTB 2x10 GTB 2x10 GTB 2x10                      [place] ball on wall   Nv? X20  Cw/ccw 20 cw/ccw                     Weight shifts EOT ? nv nv x20 x20        EOT push up plus             Ther Ex             Pulleys flex and scap nv Scap 2' Scap 2' Scap 2' Scap 2'        Supine cane flex HEP 5\"x10 5\"x10 5\"x10         Supine cane ER nv 5\"x10 5\"x10 5\"x10         Wall slides  nv 5\"x10 HEP          Bent over row HEP " 2x10 2x10  10x2 1# 2x10        Bent over shld ext HEP 2x10 2x10  10x2 1# 2x10                     Standing shld flex to 90 5/28 nv 2x10 2x10 1# 2x10        Standing shld abd to 90 5/28 nv 2x10 2x10 2x10        Sidelying abd     2x10        Sidelying ER     2x10        Ther Activity                                       Gait Training                                       Modalities

## 2024-06-12 ENCOUNTER — APPOINTMENT (OUTPATIENT)
Dept: PHYSICAL THERAPY | Facility: CLINIC | Age: 54
End: 2024-06-12
Payer: COMMERCIAL

## 2024-06-14 ENCOUNTER — TELEPHONE (OUTPATIENT)
Age: 54
End: 2024-06-14

## 2024-06-14 NOTE — TELEPHONE ENCOUNTER
Patient called the RX Refill Line. Message is being forwarded to the office.     Patient is requesting a change in dose for zolpidem (AMBIEN CR).  The 12.5mg is on national back order and the pharmacy suggested she ask for the 10mg. If appropriate, please send to [facility]    Please contact patient at : [phone]

## 2024-06-15 DIAGNOSIS — Z00.6 ENCOUNTER FOR EXAMINATION FOR NORMAL COMPARISON OR CONTROL IN CLINICAL RESEARCH PROGRAM: ICD-10-CM

## 2024-06-16 DIAGNOSIS — F98.8 ATTENTION DEFICIT DISORDER, UNSPECIFIED HYPERACTIVITY PRESENCE: ICD-10-CM

## 2024-06-16 DIAGNOSIS — F41.9 ANXIETY: ICD-10-CM

## 2024-06-16 DIAGNOSIS — F32.A DEPRESSION, UNSPECIFIED DEPRESSION TYPE: ICD-10-CM

## 2024-06-16 DIAGNOSIS — F48.9 MENTAL HEALTH PROBLEM: ICD-10-CM

## 2024-06-16 DIAGNOSIS — G47.00 INSOMNIA, UNSPECIFIED TYPE: ICD-10-CM

## 2024-06-16 RX ORDER — ZOLPIDEM TARTRATE 12.5 MG/1
12.5 TABLET, FILM COATED, EXTENDED RELEASE ORAL
Qty: 90 TABLET | Refills: 0 | Status: CANCELLED | OUTPATIENT
Start: 2024-06-16

## 2024-06-17 ENCOUNTER — APPOINTMENT (OUTPATIENT)
Dept: LAB | Facility: HOSPITAL | Age: 54
End: 2024-06-17

## 2024-06-17 DIAGNOSIS — Z00.6 ENCOUNTER FOR EXAMINATION FOR NORMAL COMPARISON OR CONTROL IN CLINICAL RESEARCH PROGRAM: ICD-10-CM

## 2024-06-17 PROCEDURE — 36415 COLL VENOUS BLD VENIPUNCTURE: CPT

## 2024-06-19 ENCOUNTER — EVALUATION (OUTPATIENT)
Dept: PHYSICAL THERAPY | Facility: CLINIC | Age: 54
End: 2024-06-19
Payer: COMMERCIAL

## 2024-06-19 DIAGNOSIS — S43.421D SPRAIN OF RIGHT ROTATOR CUFF CAPSULE, SUBSEQUENT ENCOUNTER: Primary | ICD-10-CM

## 2024-06-19 PROCEDURE — 97110 THERAPEUTIC EXERCISES: CPT

## 2024-06-19 PROCEDURE — 97112 NEUROMUSCULAR REEDUCATION: CPT

## 2024-06-19 PROCEDURE — 97140 MANUAL THERAPY 1/> REGIONS: CPT

## 2024-06-19 RX ORDER — CLONAZEPAM 0.5 MG/1
0.5 TABLET ORAL 2 TIMES DAILY PRN
Qty: 30 TABLET | Refills: 0 | Status: SHIPPED | OUTPATIENT
Start: 2024-06-19

## 2024-06-19 RX ORDER — ZOLPIDEM TARTRATE 10 MG/1
10 TABLET ORAL
Qty: 30 TABLET | Refills: 0 | Status: SHIPPED | OUTPATIENT
Start: 2024-06-19

## 2024-06-19 RX ORDER — QUETIAPINE FUMARATE 50 MG/1
50 TABLET, FILM COATED ORAL
Qty: 90 TABLET | Refills: 0 | Status: SHIPPED | OUTPATIENT
Start: 2024-06-19

## 2024-06-19 RX ORDER — DEXTROAMPHETAMINE SACCHARATE, AMPHETAMINE ASPARTATE, DEXTROAMPHETAMINE SULFATE AND AMPHETAMINE SULFATE 5; 5; 5; 5 MG/1; MG/1; MG/1; MG/1
20 TABLET ORAL
Qty: 60 TABLET | Refills: 0 | Status: SHIPPED | OUTPATIENT
Start: 2024-06-19

## 2024-06-19 NOTE — PROGRESS NOTES
"Daily Note     Today's date: 2024  Patient name: Joanie Dobbins  : 1970  MRN: [id number]  Referring provider: Moise Barr MD  Dx:   Encounter Diagnosis     ICD-10-CM    1. Sprain of right rotator cuff capsule, subsequent encounter  S43.421D                      Subjective: Patient notes follow up with surgeon was good. He gave her a new script for PT. She is able to begin progressing strengthening. Notes she has been doing more yoga. Just has general R shoulder soreness      Objective: See treatment diary below      Assessment: Patient tolerated treatment well. She has continued TTP to anterior shoulder/long head of biceps. Able to progress exercises as noted below in treatment diary with increased resistance. Give updated HEP next visit. She will benefit from skilled PT to continue strengthening her R shoulder to return to PLOF      Plan: progress per protocol     DOS: 2024; R RTC repair.   Precautions: gradually progress strength  Functional Limitations: dressing, doing her hair, typing at a computer for long periods of time, yoga, household chores  Impairments: R shoulder ROM; R shoulder strength not tested today  [employer] Code: [email or web address]  POC expiration: 2024      Manuals 5/14 5/21 5/31 6/4 6/10 6/19       R shoulder PROM   No IR until week of   10' 10' begin IR nv 10' 8' 8'       STM to anterior R shoulder and proximal bicep  5' 4' 4' 4' 4'                                 Neuro Re-Ed             TB row  OTB 2x10 GTB 2x10 GTB 2x10         TB shld ext  OTB 2x10 GTB 2x10 GTB 2x10         Bilateral TB ER      OTB 2x10       [place] ball on wall   Nv? X20  Cw/ccw 20 cw/ccw 20 cw/ccw                    Weight shifts EOT ? nv nv x20 x20 D/c       EOT push up plus      10x       Ther Ex             Pulleys flex and scap nv Scap 2' Scap 2' Scap 2' Scap 2' D/c       Supine cane flex HEP 5\"x10 5\"x10 5\"x10  D/c       Supine cane ER nv 5\"x10 5\"x10 5\"x10  D/c       Wall slides  nv 5\"x10 " "HEP          Posterior capsule stretch      20\"x3       Bent over row HEP 2x10 2x10  10x2 1# 2x10 2# 2x10       Bent over shld ext HEP 2x10 2x10  10x2 1# 2x10 2# 2x10       OHP      1# 2x10       Standing shld flex to 90 5/28 nv 2x10 2x10 1# 2x10 1# 2x10       Standing shld abd to 90 5/28 nv 2x10 2x10 2x10 1# 2x10       Sidelying abd     2x10 2x10       Sidelying ER     2x10 1# 2x10       Ther Activity                                       Gait Training                                       Modalities                                            "

## 2024-06-27 ENCOUNTER — OFFICE VISIT (OUTPATIENT)
Dept: PHYSICAL THERAPY | Facility: CLINIC | Age: 54
End: 2024-06-27
Payer: COMMERCIAL

## 2024-06-27 DIAGNOSIS — S43.421D SPRAIN OF RIGHT ROTATOR CUFF CAPSULE, SUBSEQUENT ENCOUNTER: Primary | ICD-10-CM

## 2024-06-27 PROCEDURE — 97112 NEUROMUSCULAR REEDUCATION: CPT | Performed by: PHYSICAL THERAPIST

## 2024-06-27 PROCEDURE — 97140 MANUAL THERAPY 1/> REGIONS: CPT | Performed by: PHYSICAL THERAPIST

## 2024-06-27 PROCEDURE — 97110 THERAPEUTIC EXERCISES: CPT | Performed by: PHYSICAL THERAPIST

## 2024-06-27 NOTE — PROGRESS NOTES
"Daily Note     Today's date: 2024  Patient name: Joanie Dobbins  : 1970  MRN: [id number]  Referring provider: Moise Barr MD  Dx:   Encounter Diagnosis     ICD-10-CM    1. Sprain of right rotator cuff capsule, subsequent encounter  S43.421D                      Subjective: patient offers no new complaints. No issues after last session.      Objective: See treatment diary below      Assessment: Tolerated treatment well. Continued with program as noted below. Patient demonstrated fatigue post treatment, exhibited good technique with therapeutic exercises, and would benefit from continued PT. Provided patient with updated HEP      Plan: Continue per plan of care.      DOS: 2024; R RTC repair.   Precautions: gradually progress strength  Functional Limitations: dressing, doing her hair, typing at a computer for long periods of time, yoga, household chores  Impairments: R shoulder ROM; R shoulder strength not tested today  [employer] Code: [id number]  POC expiration: 2024      Manuals 5/14 5/21 5/31 6/4 6/10 6/19 6/27      R shoulder PROM   No IR until week of   10' 10' begin IR nv 10' 8' 8' 8'      STM to anterior R shoulder and proximal bicep  5' 4' 4' 4' 4' 4'                                Neuro Re-Ed             TB row  OTB 2x10 GTB 2x10 GTB 2x10         TB shld ext  OTB 2x10 GTB 2x10 GTB 2x10         Bilateral TB ER      OTB 2x10 OTB 2x10      [place] ball on wall   Nv? X20  Cw/ccw 20 cw/ccw 20 cw/ccw 20 cw/ccw                   Weight shifts EOT ? nv nv x20 x20 D/c       EOT push up plus      10x 12x      Ther Ex             Pulleys flex and scap nv Scap 2' Scap 2' Scap 2' Scap 2' D/c       Supine cane flex HEP 5\"x10 5\"x10 5\"x10  D/c       Supine cane ER nv 5\"x10 5\"x10 5\"x10  D/c       Wall slides  nv 5\"x10 HEP          Posterior capsule stretch      20\"x3 20\"x3      Bent over row HEP 2x10 2x10  10x2 1# 2x10 2# 2x10 2#  3x10      Bent over shld ext HEP 2x10 2x10  10x2 1# 2x10 2# " 2x10 2#  3x10      OHP      1# 2x10 1# 2x10      Standing shld flex to 90 5/28 nv 2x10 2x10 1# 2x10 1# 2x10 1# 2x10      Standing shld abd to 90 5/28 nv 2x10 2x10 2x10 1# 2x10 1# 2x10      Sidelying abd     2x10 2x10 2x10      Sidelying ER     2x10 1# 2x10 1#  3x10      Ther Activity                                       Gait Training                                       Modalities

## 2024-07-02 ENCOUNTER — OFFICE VISIT (OUTPATIENT)
Dept: PHYSICAL THERAPY | Facility: CLINIC | Age: 54
End: 2024-07-02
Payer: COMMERCIAL

## 2024-07-02 DIAGNOSIS — S43.421D SPRAIN OF RIGHT ROTATOR CUFF CAPSULE, SUBSEQUENT ENCOUNTER: Primary | ICD-10-CM

## 2024-07-02 PROCEDURE — 97112 NEUROMUSCULAR REEDUCATION: CPT

## 2024-07-02 PROCEDURE — 97110 THERAPEUTIC EXERCISES: CPT

## 2024-07-02 PROCEDURE — 97140 MANUAL THERAPY 1/> REGIONS: CPT

## 2024-07-02 NOTE — PROGRESS NOTES
"Daily Note     Today's date: 2024  Patient name: Joanie Dobbins  : 1970  MRN: [id number]  Referring provider: Moise Barr MD  Dx:   Encounter Diagnosis     ICD-10-CM    1. Sprain of right rotator cuff capsule, subsequent encounter  S43.421D                      Subjective: patient reports her shoulder has been more sore over the past few days. Has been trying to take it easy with yoga since      Objective: See treatment diary below      Assessment: Patient tolerated treatment fair. Treatment remained the same today secondary to increased soreness over the past few days. She continues to fatigue with exercises involving elevating the arm. Will benefit from skilled PT to continue progressing strength and ROM to return to PLOF      Plan: Continue per plan of care.      DOS: 2024; R RTC repair.   Precautions: gradually progress strength  Functional Limitations: dressing, doing her hair, typing at a computer for long periods of time, yoga, household chores  Impairments: R shoulder ROM; R shoulder strength not tested today  [employer] Code: [id number]  POC expiration: 2024      Manuals 5/14 5/21 5/31 6/4 6/10 6/19 6/27 7     R shoulder PROM   No IR until week of   10' 10' begin IR nv 10' 8' 8' 8' 8'     STM to anterior R shoulder and proximal bicep  5' 4' 4' 4' 4' 4' 4'                               Neuro Re-Ed             TB row  OTB 2x10 GTB 2x10 GTB 2x10         TB shld ext  OTB 2x10 GTB 2x10 GTB 2x10         Bilateral TB ER      OTB 2x10 OTB 2x10 OTB 2x10     [place] ball on wall   Nv? X20  Cw/ccw 20 cw/ccw 20 cw/ccw 20 cw/ccw 20 cw/ccw                  Weight shifts EOT ? nv nv x20 x20 D/c       EOT push up plus      10x 12x 15x     Ther Ex             Pulleys flex and scap nv Scap 2' Scap 2' Scap 2' Scap 2' D/c       Supine cane flex HEP 5\"x10 5\"x10 5\"x10  D/c       Supine cane ER nv 5\"x10 5\"x10 5\"x10  D/c       Wall slides  nv 5\"x10 HEP          Posterior capsule stretch      " "20\"x3 20\"x3 20\"x3     Bent over row HEP 2x10 2x10  10x2 1# 2x10 2# 2x10 2#  3x10 2# 2x10     Bent over shld ext HEP 2x10 2x10  10x2 1# 2x10 2# 2x10 2#  3x10 2# 2x10     OHP      1# 2x10 1# 2x10 1# 2x10     Standing shld flex to 90 5/28 nv 2x10 2x10 1# 2x10 1# 2x10 1# 2x10 1# 2x10     Standing shld abd to 90 5/28 nv 2x10 2x10 2x10 1# 2x10 1# 2x10 1# 2x10     Sidelying abd     2x10 2x10 2x10 2x10     Sidelying ER     2x10 1# 2x10 1#  3x10 1# 2x10     Ther Activity                                       Gait Training                                       Modalities                                              "

## 2024-07-18 ENCOUNTER — EVALUATION (OUTPATIENT)
Dept: PHYSICAL THERAPY | Facility: CLINIC | Age: 54
End: 2024-07-18
Payer: COMMERCIAL

## 2024-07-18 DIAGNOSIS — F41.9 ANXIETY: ICD-10-CM

## 2024-07-18 DIAGNOSIS — G47.00 INSOMNIA, UNSPECIFIED TYPE: ICD-10-CM

## 2024-07-18 DIAGNOSIS — F98.8 ATTENTION DEFICIT DISORDER, UNSPECIFIED HYPERACTIVITY PRESENCE: ICD-10-CM

## 2024-07-18 DIAGNOSIS — R60.0 PERIPHERAL EDEMA: ICD-10-CM

## 2024-07-18 DIAGNOSIS — F48.9 MENTAL HEALTH PROBLEM: ICD-10-CM

## 2024-07-18 DIAGNOSIS — F32.A DEPRESSION, UNSPECIFIED DEPRESSION TYPE: ICD-10-CM

## 2024-07-18 DIAGNOSIS — S43.421D SPRAIN OF RIGHT ROTATOR CUFF CAPSULE, SUBSEQUENT ENCOUNTER: Primary | ICD-10-CM

## 2024-07-18 DIAGNOSIS — I15.9 SECONDARY HYPERTENSION: ICD-10-CM

## 2024-07-18 DIAGNOSIS — I10 BENIGN ESSENTIAL HTN: ICD-10-CM

## 2024-07-18 PROCEDURE — 97140 MANUAL THERAPY 1/> REGIONS: CPT

## 2024-07-18 PROCEDURE — 97110 THERAPEUTIC EXERCISES: CPT

## 2024-07-18 NOTE — PROGRESS NOTES
PT Re-Evaluation     Today's date: 2024  Patient name: Joanie Dobbins  : 1970  MRN: [id number]  Referring provider: Moise Barr MD  Dx:   Encounter Diagnosis     ICD-10-CM    1. Sprain of right rotator cuff capsule, subsequent encounter  S43.421D                      Assessment  Impairments: abnormal or restricted ROM, activity intolerance, impaired physical strength, lacks appropriate home exercise program and pain with function    Assessment details: Joanie Dobbins is a pleasant 53 y.o. female who presents 6 weeks s/p R RTC repair on 2024. She presents with decreased R shoulder ROM. R shoulder strength not tested today secondary to surgery 6 weeks ago - will test when appropriate. These impairments and precautions following surgery are limiting her with dressing, doing her hair, typing at the computer, reaching overhead, yoga, and household chores. These signs and symptoms are consistent with RTC repair. She will benefit from skilled PT to address impairments and return to PLOF. No referral necessary at this time.    2024: Patient presents 15 weeks s/p R RTC repair. She has full R shoulder ROM with pain at endrange abduction. She has decreased strength especially in abduction and flexion. These impairments are limiting her with positions in yoga involving weight bearing and reaching/lifting overhead. Will benefit from skilled PT to address impairments and return to PLOF      Prognosis details: Positive prognostic indicators include positive attitude toward recovery, motivated to improve, high self-efficacy, good understanding of condition, realistic expectations.  Negative prognostic indicators include chronicity of symptoms, high symptom irritability    Goals  STG to be achieved in 6 weeks  Patient will have improved R shoulder ROM in all planes to WNLs (180 deg abd and flex, 90 deg ER) - met  Patient will have improved gross R shoulder strength to 5/5 - good progress; ongoing  Able  to dress without difficulty - met  Patient will be independent with HEP. - met    LTG to be achieved in 12 weeks  Patient will be able to resume yoga with no restrictions - good progress; ongoing  Patient will be able to perform all household chores and ADLs - good progress; ongoing      Plan  Patient would benefit from: skilled physical therapy  Planned modality interventions: cryotherapy and thermotherapy: hydrocollator packs    Planned therapy interventions: home exercise program, functional ROM exercises, body mechanics training, joint mobilization, manual therapy, neuromuscular re-education, therapeutic exercise, therapeutic activities, stretching, strengthening, patient education and flexibility    Frequency: 1x week  Plan of Care beginning date: 2024  Plan of Care expiration date: 2024  Treatment plan discussed with: patient        Subjective Evaluation    History of Present Illness  Date of surgery: 2024  Mechanism of injury: Patient has had chronic R shoulder pain - she did PT prior to surgery but this did not help. She had R RTC repair on 2024. She stopped wearing the sling at 3 weeks post op. She has difficulty pulling up her pants, typing at the computer for long periods of time, dressing, doing her hair, household chores, and fully participating in yoga.     She returned to work a few days after the surgery.     Follow up with Dr. Barr on .     2024: Patient notes she has been doing well overall. She has been doing yoga but still has trouble with some positions involving putting weight through her R arm. She no longer has difficulty with doing her hair, dressing, typing on the computer. Most trouble with lifting overhead. Follow up with Dr. Barr is on     Patient Goals  Patient goal: Patient would like to return to yoga without limitations, perform ADLs.  Pain  Current pain ratin  At best pain ratin  At worst pain ratin  Relieving factors:  "ice          Objective     Active Range of Motion   Left Shoulder   Normal active range of motion    Right Shoulder   Flexion: 180 degrees   Abduction: 180 degrees with pain  External rotation 90°: 65 degrees  Internal rotation 90°: 65 degrees     Passive Range of Motion   Left Shoulder   Normal passive range of motion    Right Shoulder   Flexion: WFL  Abduction: WFL and with pain    Additional Passive Range of Motion Details  Empty endfeel during PROM due to pain    Strength/Myotome Testing     Left Shoulder   Normal muscle strength    Right Shoulder     Planes of Motion   Flexion: 4   Abduction: 4-   External rotation at 0°: 5   Internal rotation at 0°: 5                DOS: 4/2/2024; R RTC repair.   Precautions: AROM shoulder ROM to 90 deg; AAROM and PROM within tolerance, periscap exercises; 1 pound weight resitriction  Functional Limitations: dressing, doing her hair, typing at a computer for long periods of time, yoga, household chores  Impairments: R shoulder ROM; R shoulder strength not tested today  [employer] Code: [email or web address]  POC expiration: 9/12/2024    Manuals 6/4 6/10 6/19 6/27 7/2 7/18      R shoulder PROM   No IR until week of 5/28 10' 8' 8' 8' 8' 8'      STM to anterior R shoulder and proximal bicep 4' 4' 4' 4' 4' 4'                              Neuro Re-Ed            TB row GTB 2x10           TB shld ext GTB 2x10           TB serratus flex      OTB 2x10      TB horz abd      OTB 2x10      Bilateral TB ER   OTB 2x10 OTB 2x10 OTB 2x10 OTB 2x10      [place] ball on wall X20  Cw/ccw 20 cw/ccw 20 cw/ccw 20 cw/ccw 20 cw/ccw 20 cw/ccw overhead      Side plank on R side      20\"x3      Weight shifts EOT x20 x20 D/c         EOT push up plus   10x 12x 15x D/c      Ther Ex            Pulleys flex and scap Scap 2' Scap 2' D/c         Supine cane flex 5\"x10  D/c         Supine cane ER 5\"x10  D/c         Posterior capsule stretch   20\"x3 20\"x3 20\"x3 HEP       Bent over row 10x2 1# 2x10 2# 2x10 2#  3x10 2# 2x10     "   Bent over shld ext 10x2 1# 2x10 2# 2x10 2#  3x10 2# 2x10       OHP   1# 2x10 1# 2x10 1# 2x10 1# 2x10      Standing shld flex to 90 2x10 1# 2x10 1# 2x10 1# 2x10 1# 2x10 2# 2x10      Standing shld abd to 90 2x10 2x10 1# 2x10 1# 2x10 1# 2x10 1# 2x10      Sidelying abd  2x10 2x10 2x10 2x10 D/c      Sidelying ER  2x10 1# 2x10 1#  3x10 1# 2x10 D/c      CC lat pulldown      44# 2x10      CC row      44# 2x10                                                      Ther Activity                                    Gait Training                                    Modalities

## 2024-07-18 NOTE — LETTER
2024    Moise Barr MD  [address] [facility]  [place] [address]    Patient: Joanie Dobbins   YOB: 1970   Date of Visit: 2024     Encounter Diagnosis     ICD-10-CM    1. Sprain of right rotator cuff capsule, subsequent encounter  S43.421D           Dear Dr. Barr:    Thank you for your recent referral of Joanie Dobbins. Please review the attached evaluation summary from Joanie's recent visit.     Please verify that you agree with the plan of care by signing the attached order.     If you have any questions or concerns, please do not hesitate to call.     I sincerely appreciate the opportunity to share in the care of one of your patients and hope to have another opportunity to work with you in the near future.       Sincerely,    Aidel Rudd      Referring Provider:      I certify that I have read the below Plan of Care and certify the need for these services furnished under this plan of treatment while under my care.                    Moise Barr MD  [address] [facility]  [place] [address]  Via Fax: [phone]          PT Re-Evaluation     Today's date: 2024  Patient name: Joanie Dobbins  : 1970  MRN: [id number]  Referring provider: Moise Barr MD  Dx:   Encounter Diagnosis     ICD-10-CM    1. Sprain of right rotator cuff capsule, subsequent encounter  S43.421D                      Assessment  Impairments: abnormal or restricted ROM, activity intolerance, impaired physical strength, lacks appropriate home exercise program and pain with function    Assessment details: Joanie Dobbins is a pleasant 53 y.o. female who presents 6 weeks s/p R RTC repair on 2024. She presents with decreased R shoulder ROM. R shoulder strength not tested today secondary to surgery 6 weeks ago - will test when appropriate. These impairments and precautions following surgery are limiting her with dressing, doing her hair, typing at the computer, reaching  overhead, yoga, and household chores. These signs and symptoms are consistent with RTC repair. She will benefit from skilled PT to address impairments and return to PLOF. No referral necessary at this time.    7/18/2024: Patient presents 15 weeks s/p R RTC repair. She has full R shoulder ROM with pain at endrange abduction. She has decreased strength especially in abduction and flexion. These impairments are limiting her with positions in yoga involving weight bearing and reaching/lifting overhead. Will benefit from skilled PT to address impairments and return to PLOF      Prognosis details: Positive prognostic indicators include positive attitude toward recovery, motivated to improve, high self-efficacy, good understanding of condition, realistic expectations.  Negative prognostic indicators include chronicity of symptoms, high symptom irritability    Goals  STG to be achieved in 6 weeks  Patient will have improved R shoulder ROM in all planes to WNLs (180 deg abd and flex, 90 deg ER) - met  Patient will have improved gross R shoulder strength to 5/5 - good progress; ongoing  Able to dress without difficulty - met  Patient will be independent with HEP. - met    LTG to be achieved in 12 weeks  Patient will be able to resume yoga with no restrictions - good progress; ongoing  Patient will be able to perform all household chores and ADLs - good progress; ongoing      Plan  Patient would benefit from: skilled physical therapy  Planned modality interventions: cryotherapy and thermotherapy: hydrocollator packs    Planned therapy interventions: home exercise program, functional ROM exercises, body mechanics training, joint mobilization, manual therapy, neuromuscular re-education, therapeutic exercise, therapeutic activities, stretching, strengthening, patient education and flexibility    Frequency: 1x week  Plan of Care beginning date: 7/18/2024  Plan of Care expiration date: 9/12/2024  Treatment plan discussed with:  patient        Subjective Evaluation    History of Present Illness  Date of surgery: 2024  Mechanism of injury: Patient has had chronic R shoulder pain - she did PT prior to surgery but this did not help. She had R RTC repair on 2024. She stopped wearing the sling at 3 weeks post op. She has difficulty pulling up her pants, typing at the computer for long periods of time, dressing, doing her hair, household chores, and fully participating in yoga.     She returned to work a few days after the surgery.     Follow up with Dr. Barr on .     2024: Patient notes she has been doing well overall. She has been doing yoga but still has trouble with some positions involving putting weight through her R arm. She no longer has difficulty with doing her hair, dressing, typing on the computer. Most trouble with lifting overhead. Follow up with Dr. Barr is on     Patient Goals  Patient goal: Patient would like to return to yoga without limitations, perform ADLs.  Pain  Current pain ratin  At best pain ratin  At worst pain ratin  Relieving factors: ice          Objective     Active Range of Motion   Left Shoulder   Normal active range of motion    Right Shoulder   Flexion: 180 degrees   Abduction: 180 degrees with pain  External rotation 90°: 65 degrees  Internal rotation 90°: 65 degrees     Passive Range of Motion   Left Shoulder   Normal passive range of motion    Right Shoulder   Flexion: WFL  Abduction: WFL and with pain    Additional Passive Range of Motion Details  Empty endfeel during PROM due to pain    Strength/Myotome Testing     Left Shoulder   Normal muscle strength    Right Shoulder     Planes of Motion   Flexion: 4   Abduction: 4-   External rotation at 0°: 5   Internal rotation at 0°: 5                DOS: 2024; R RTC repair.   Precautions: AROM shoulder ROM to 90 deg; AAROM and PROM within tolerance, periscap exercises; 1 pound weight resitriction  Functional Limitations:  "dressing, doing her hair, typing at a computer for long periods of time, yoga, household chores  Impairments: R shoulder ROM; R shoulder strength not tested today  [employer] Code: [id number]  POC expiration: 9/12/2024    Manuals 6/4 6/10 6/19 6/27 7/2 7/18      R shoulder PROM   No IR until week of 5/28 10' 8' 8' 8' 8' 8'      STM to anterior R shoulder and proximal bicep 4' 4' 4' 4' 4' 4'                              Neuro Re-Ed            TB row GTB 2x10           TB shld ext GTB 2x10           TB serratus flex      OTB 2x10      TB horz abd      OTB 2x10      Bilateral TB ER   OTB 2x10 OTB 2x10 OTB 2x10 OTB 2x10      [place] ball on wall X20  Cw/ccw 20 cw/ccw 20 cw/ccw 20 cw/ccw 20 cw/ccw 20 cw/ccw overhead      Side plank on R side      20\"x3      Weight shifts EOT x20 x20 D/c         EOT push up plus   10x 12x 15x D/c      Ther Ex            Pulleys flex and scap Scap 2' Scap 2' D/c         Supine cane flex 5\"x10  D/c         Supine cane ER 5\"x10  D/c         Posterior capsule stretch   20\"x3 20\"x3 20\"x3 HEP       Bent over row 10x2 1# 2x10 2# 2x10 2#  3x10 2# 2x10       Bent over shld ext 10x2 1# 2x10 2# 2x10 2#  3x10 2# 2x10       OHP   1# 2x10 1# 2x10 1# 2x10 1# 2x10      Standing shld flex to 90 2x10 1# 2x10 1# 2x10 1# 2x10 1# 2x10 2# 2x10      Standing shld abd to 90 2x10 2x10 1# 2x10 1# 2x10 1# 2x10 1# 2x10      Sidelying abd  2x10 2x10 2x10 2x10 D/c      Sidelying ER  2x10 1# 2x10 1#  3x10 1# 2x10 D/c      CC lat pulldown      44# 2x10      CC row      44# 2x10                                                      Ther Activity                                    Gait Training                                    Modalities                                                           "

## 2024-07-19 RX ORDER — FUROSEMIDE 20 MG/1
20 TABLET ORAL DAILY
Qty: 100 TABLET | Refills: 1 | Status: SHIPPED | OUTPATIENT
Start: 2024-07-19

## 2024-07-19 RX ORDER — NIFEDIPINE 60 MG/1
60 TABLET, EXTENDED RELEASE ORAL DAILY
Qty: 100 TABLET | Refills: 1 | Status: SHIPPED | OUTPATIENT
Start: 2024-07-19

## 2024-07-19 RX ORDER — QUETIAPINE FUMARATE 50 MG/1
50 TABLET, FILM COATED ORAL
Qty: 90 TABLET | Refills: 0 | Status: SHIPPED | OUTPATIENT
Start: 2024-07-19

## 2024-07-19 RX ORDER — CLONAZEPAM 0.5 MG/1
0.5 TABLET ORAL 2 TIMES DAILY PRN
Qty: 30 TABLET | Refills: 0 | Status: SHIPPED | OUTPATIENT
Start: 2024-07-19

## 2024-07-19 RX ORDER — DEXTROAMPHETAMINE SACCHARATE, AMPHETAMINE ASPARTATE, DEXTROAMPHETAMINE SULFATE AND AMPHETAMINE SULFATE 5; 5; 5; 5 MG/1; MG/1; MG/1; MG/1
20 TABLET ORAL
Qty: 60 TABLET | Refills: 0 | Status: SHIPPED | OUTPATIENT
Start: 2024-07-19

## 2024-07-25 ENCOUNTER — OFFICE VISIT (OUTPATIENT)
Dept: PHYSICAL THERAPY | Facility: CLINIC | Age: 54
End: 2024-07-25
Payer: COMMERCIAL

## 2024-07-25 DIAGNOSIS — S43.421D SPRAIN OF RIGHT ROTATOR CUFF CAPSULE, SUBSEQUENT ENCOUNTER: Primary | ICD-10-CM

## 2024-07-25 PROCEDURE — 97140 MANUAL THERAPY 1/> REGIONS: CPT

## 2024-07-25 PROCEDURE — 97110 THERAPEUTIC EXERCISES: CPT

## 2024-07-25 NOTE — PROGRESS NOTES
"Daily Note     Today's date: 2024  Patient name: Joanie Dobbins  : 1970  MRN: [id number]  Referring provider: Moise Barr MD  Dx:   Encounter Diagnosis     ICD-10-CM    1. Sprain of right rotator cuff capsule, subsequent encounter  S43.421D                      Subjective: Patient reports no adverse reaction to last visit. Shoulder has been sore the past few days but can't attribute it to anything different that she did      Objective: See treatment diary below      Assessment: Patient tolerated treatment well. Has continued mild discomfort with overhead exercise especially with increased resistance. She will benefit from skilled PT to address impairments and return to PLOF      Plan: progress strength as able     DOS: 2024; R RTC repair.   Precautions: AROM shoulder ROM to 90 deg; AAROM and PROM within tolerance, periscap exercises;   Functional Limitations: dressing, doing her hair, typing at a computer for long periods of time, yoga, household chores  Impairments: R shoulder ROM; R shoulder strength not tested today  [employer] Code: [email or web address]  POC expiration: 2024    Manuals 6/4 6/10 6/19 6/27 7/2 7/18 7/25     R shoulder PROM   No IR until week of  10' 8' 8' 8' 8' 8' 8'     STM to anterior R shoulder and proximal bicep 4' 4' 4' 4' 4' 4' 4'                             Neuro Re-Ed            TB row GTB 2x10           TB shld ext GTB 2x10      GTB 2x10     TB serratus flex      OTB 2x10 OTB 2x10     TB horz abd      OTB 2x10 OTB 2x10     Bilateral TB ER   OTB 2x10 OTB 2x10 OTB 2x10 OTB 2x10 OTB 2x10     [place] ball on wall X20  Cw/ccw 20 cw/ccw 20 cw/ccw 20 cw/ccw 20 cw/ccw 20 cw/ccw overhead 20 cw/ccw overhead     Side plank on R side      20\"x3      Weight shifts EOT x20 x20 D/c         EOT push up plus   10x 12x 15x D/c Pushups EOT 20x     Ther Ex            Pulleys flex and scap Scap 2' Scap 2' D/c         Supine cane flex 5\"x10  D/c         Supine cane ER 5\"x10  D/c       " "  Posterior capsule stretch   20\"x3 20\"x3 20\"x3 HEP       Bent over row 10x2 1# 2x10 2# 2x10 2#  3x10 2# 2x10       Bent over shld ext 10x2 1# 2x10 2# 2x10 2#  3x10 2# 2x10       OHP   1# 2x10 1# 2x10 1# 2x10 1# 2x10 1# 2x10     Standing shld flex to 90 2x10 1# 2x10 1# 2x10 1# 2x10 1# 2x10 2# 2x10 2# 2x10     Standing shld abd to 90 2x10 2x10 1# 2x10 1# 2x10 1# 2x10 1# 2x10 1# 2x10     Sidelying abd  2x10 2x10 2x10 2x10 D/c      Sidelying ER  2x10 1# 2x10 1#  3x10 1# 2x10 D/c      CC lat pulldown      44# 2x10 55# 2x10     CC row      44# 2x10 55# 2x10                                                     Ther Activity                                    Gait Training                                    Modalities                                           "

## 2024-07-26 DIAGNOSIS — G47.00 INSOMNIA, UNSPECIFIED TYPE: ICD-10-CM

## 2024-07-26 RX ORDER — ZOLPIDEM TARTRATE 12.5 MG/1
12.5 TABLET, FILM COATED, EXTENDED RELEASE ORAL
Qty: 90 TABLET | Refills: 0 | Status: SHIPPED | OUTPATIENT
Start: 2024-07-26

## 2024-08-01 ENCOUNTER — OFFICE VISIT (OUTPATIENT)
Dept: PHYSICAL THERAPY | Facility: CLINIC | Age: 54
End: 2024-08-01
Payer: COMMERCIAL

## 2024-08-01 DIAGNOSIS — S43.421D SPRAIN OF RIGHT ROTATOR CUFF CAPSULE, SUBSEQUENT ENCOUNTER: Primary | ICD-10-CM

## 2024-08-01 LAB
APOB+LDLR+PCSK9 GENE MUT ANL BLD/T: NOT DETECTED
BRCA1+BRCA2 DEL+DUP + FULL MUT ANL BLD/T: NOT DETECTED
MLH1+MSH2+MSH6+PMS2 GN DEL+DUP+FUL M: NOT DETECTED

## 2024-08-01 PROCEDURE — 97110 THERAPEUTIC EXERCISES: CPT

## 2024-08-01 PROCEDURE — 97140 MANUAL THERAPY 1/> REGIONS: CPT

## 2024-08-01 NOTE — PROGRESS NOTES
"Daily Note     Today's date: 2024  Patient name: Joanie Dobbins  : 1970  MRN: [id number]  Referring provider: Moise Barr MD  Dx:   Encounter Diagnosis     ICD-10-CM    1. Sprain of right rotator cuff capsule, subsequent encounter  S43.421D                      Subjective: Patient reports being more sore today. Notes she has been very busy over the past couple days. Has not been doing as much yoga      Objective: See treatment diary below      Assessment: Patient tolerated treatment well. Decreased intensity of exercises today secondary to increased pain/soreness today. She will benefit from skilled PT to address strength of R shoulder to return to PLOF.       Plan: re-evaluation next visit     DOS: 2024; R RTC repair.   Precautions: AROM shoulder ROM to 90 deg; AAROM and PROM within tolerance, periscap exercises;   Functional Limitations: dressing, doing her hair, typing at a computer for long periods of time, yoga, household chores  Impairments: R shoulder ROM; R shoulder strength not tested today  Med[employer] Code: [email or web address]  POC expiration: 2024    Manuals 6/4 6/10 6/19 6/27 7/2 7/18 7/25 8/1     R shoulder PROM   No IR until week of  10' 8' 8' 8' 8' 8' 8' 8'      STM to anterior R shoulder and proximal bicep 4' 4' 4' 4' 4' 4' 4' 4'                               Neuro Re-Ed             TB row GTB 2x10            TB shld ext GTB 2x10      GTB 2x10 GTB 2x10     TB serratus flex      OTB 2x10 OTB 2x10 OTB 2x10     TB horz abd      OTB 2x10 OTB 2x10 OTB 2x10     Bilateral TB ER   OTB 2x10 OTB 2x10 OTB 2x10 OTB 2x10 OTB 2x10 OTB 2x10     [place] ball on wall X20  Cw/ccw 20 cw/ccw 20 cw/ccw 20 cw/ccw 20 cw/ccw 20 cw/ccw overhead 20 cw/ccw overhead 20 cw/ccw overhead     Side plank on R side      20\"x3       Weight shifts EOT x20 x20 D/c          EOT push up plus   10x 12x 15x D/c Pushups EOT 20x Pushups EOT 20x     Ther Ex             Pulleys flex and scap Scap 2' Scap 2' D/c        " "  Supine cane flex 5\"x10  D/c          Supine cane ER 5\"x10  D/c          Posterior capsule stretch   20\"x3 20\"x3 20\"x3 HEP        Bent over row 10x2 1# 2x10 2# 2x10 2#  3x10 2# 2x10        Bent over shld ext 10x2 1# 2x10 2# 2x10 2#  3x10 2# 2x10        OHP   1# 2x10 1# 2x10 1# 2x10 1# 2x10 1# 2x10 1# 2x10     Standing shld flex to 90 2x10 1# 2x10 1# 2x10 1# 2x10 1# 2x10 2# 2x10 2# 2x10 1# 2x10     Standing shld abd to 90 2x10 2x10 1# 2x10 1# 2x10 1# 2x10 1# 2x10 1# 2x10 1# 2x10     Sidelying abd  2x10 2x10 2x10 2x10 D/c       Sidelying ER  2x10 1# 2x10 1#  3x10 1# 2x10 D/c       CC lat pulldown      44# 2x10 55# 2x10 55# 2x10     CC row      44# 2x10 55# 2x10 55# 2x10                                                         Ther Activity                                       Gait Training                                       Modalities                                              "

## 2024-08-08 ENCOUNTER — EVALUATION (OUTPATIENT)
Dept: PHYSICAL THERAPY | Facility: CLINIC | Age: 54
End: 2024-08-08
Payer: COMMERCIAL

## 2024-08-08 DIAGNOSIS — S43.421D SPRAIN OF RIGHT ROTATOR CUFF CAPSULE, SUBSEQUENT ENCOUNTER: Primary | ICD-10-CM

## 2024-08-08 PROCEDURE — 97112 NEUROMUSCULAR REEDUCATION: CPT

## 2024-08-08 PROCEDURE — 97140 MANUAL THERAPY 1/> REGIONS: CPT

## 2024-08-08 PROCEDURE — 97110 THERAPEUTIC EXERCISES: CPT

## 2024-08-08 NOTE — PROGRESS NOTES
PT Re-Evaluation     Today's date: 2024  Patient name: Joanie Dobbins  : 1970  MRN: [id number]  Referring provider: Moise Barr MD  Dx:   Encounter Diagnosis     ICD-10-CM    1. Sprain of right rotator cuff capsule, subsequent encounter  S43.421D                      Assessment  Impairments: abnormal or restricted ROM, activity intolerance, impaired physical strength, lacks appropriate home exercise program and pain with function    Assessment details: Joanie Dobbins is a pleasant 53 y.o. female who presents 6 weeks s/p R RTC repair on 2024. She presents with decreased R shoulder ROM. R shoulder strength not tested today secondary to surgery 6 weeks ago - will test when appropriate. These impairments and precautions following surgery are limiting her with dressing, doing her hair, typing at the computer, reaching overhead, yoga, and household chores. These signs and symptoms are consistent with RTC repair. She will benefit from skilled PT to address impairments and return to PLOF. No referral necessary at this time.    2024: Patient presents 15 weeks s/p R RTC repair. She has full R shoulder ROM with pain at endrange abduction. She has decreased strength especially in abduction and flexion. These impairments are limiting her with positions in yoga involving weight bearing and reaching/lifting overhead. Will benefit from skilled PT to address impairments and return to PLOF    2024: Patient presents 18 weeks s/p R RTC repair. Continues to have full shoulder ROM but has pain at endrange abduction and endrange ER. No change in strength since last re-evaluation as it was only 3 weeks ago. Primarily limited in abduction and flexion strength. These impairments are limiting her with lifting, reaching overhead, yoga poses. She will benefit from skilled PT to address impairments and return to PLOF      Prognosis details: Positive prognostic indicators include positive attitude toward  recovery, motivated to improve, high self-efficacy, good understanding of condition, realistic expectations.  Negative prognostic indicators include chronicity of symptoms, high symptom irritability    Goals  STG to be achieved in 6 weeks  Patient will have improved R shoulder ROM in all planes to WNLs (180 deg abd and flex, 90 deg ER) - met  Patient will have improved gross R shoulder strength to 5/5 - good progress; ongoing  Able to dress without difficulty - met  Patient will be independent with HEP. - met    LTG to be achieved in 12 weeks  Patient will be able to resume yoga with no restrictions - good progress; ongoing  Patient will be able to perform all household chores and ADLs - good progress; ongoing      Plan  Patient would benefit from: skilled physical therapy  Planned modality interventions: cryotherapy and thermotherapy: hydrocollator packs    Planned therapy interventions: home exercise program, functional ROM exercises, body mechanics training, joint mobilization, manual therapy, neuromuscular re-education, therapeutic exercise, therapeutic activities, stretching, strengthening, patient education and flexibility    Frequency: 1x week  Plan of Care beginning date: 8/8/2024  Plan of Care expiration date: 9/19/2024  Treatment plan discussed with: patient        Subjective Evaluation    History of Present Illness  Date of surgery: 4/2/2024  Mechanism of injury: Patient has had chronic R shoulder pain - she did PT prior to surgery but this did not help. She had R RTC repair on 4/2/2024. She stopped wearing the sling at 3 weeks post op. She has difficulty pulling up her pants, typing at the computer for long periods of time, dressing, doing her hair, household chores, and fully participating in yoga.     She returned to work a few days after the surgery.     Follow up with Dr. Barr on June 14th.     7/18/2024: Patient notes she has been doing well overall. She has been doing yoga but still has trouble  with some positions involving putting weight through her R arm. She no longer has difficulty with doing her hair, dressing, typing on the computer. Most trouble with lifting overhead. Follow up with Dr. Barr is on 2024: Patient reports doing well overall. She has most pain when reaching behind her or overhead. She has trouble with some yoga positions still.   Patient Goals  Patient goal: Patient would like to return to yoga without limitations, perform ADLs.  Pain  Current pain ratin  At best pain ratin  At worst pain ratin  Relieving factors: ice          Objective     Active Range of Motion   Left Shoulder   Normal active range of motion    Right Shoulder   Flexion: 180 degrees   Abduction: 180 degrees   External rotation 90°: 65 degrees  Internal rotation 90°: 65 degrees     Passive Range of Motion   Left Shoulder   Normal passive range of motion    Right Shoulder   Flexion: WFL  Abduction: WFL and with pain    Additional Passive Range of Motion Details  Empty endfeel during PROM due to pain    Strength/Myotome Testing     Left Shoulder   Normal muscle strength    Right Shoulder     Planes of Motion   Flexion: 4+   Abduction: 4   External rotation at 0°: 4+   Internal rotation at 0°: 5                DOS: 2024; R RTC repair.   Precautions: AROM shoulder ROM to 90 deg; AAROM and PROM within tolerance, periscap exercises; 1 pound weight resitriction  Functional Limitations: dressing, doing her hair, typing at a computer for long periods of time, yoga, household chores  Impairments: R shoulder ROM; R shoulder strength not tested today  MedBridge Code: [email or web address]  POC expiration: 2024    Manuals 6/4 6/10 6/19 6/27 7/2 7/18 7/25 8/1 8/8    R shoulder PROM   No IR until week of  10' 8' 8' 8' 8' 8' 8' 8'  8'    STM to anterior R shoulder and proximal bicep 4' 4' 4' 4' 4' 4' 4' 4' 4'                              Neuro Re-Ed             TB row GTB 2x10            TB shld ext GTB 2x10   "    GTB 2x10 GTB 2x10     TB serratus flex      OTB 2x10 OTB 2x10 OTB 2x10 OTB 2x10    TB horz abd      OTB 2x10 OTB 2x10 OTB 2x10     Bilateral TB ER   OTB 2x10 OTB 2x10 OTB 2x10 OTB 2x10 OTB 2x10 OTB 2x10 OTB 2x10    [place] ball on wall X20  Cw/ccw 20 cw/ccw 20 cw/ccw 20 cw/ccw 20 cw/ccw 20 cw/ccw overhead 20 cw/ccw overhead 20 cw/ccw overhead     Side plank on R side      20\"x3       Weight shifts EOT x20 x20 D/c          EOT push up plus   10x 12x 15x D/c Pushups EOT 20x Pushups EOT 20x     Ther Ex             Pulleys flex and scap Scap 2' Scap 2' D/c          Supine cane flex 5\"x10  D/c          Supine cane ER 5\"x10  D/c          Posterior capsule stretch   20\"x3 20\"x3 20\"x3 HEP        Bent over row 10x2 1# 2x10 2# 2x10 2#  3x10 2# 2x10        Bent over shld ext 10x2 1# 2x10 2# 2x10 2#  3x10 2# 2x10        OHP   1# 2x10 1# 2x10 1# 2x10 1# 2x10 1# 2x10 1# 2x10 1# 2x10    Standing shld flex to 90 2x10 1# 2x10 1# 2x10 1# 2x10 1# 2x10 2# 2x10 2# 2x10 1# 2x10 2# 2x10    Standing shld abd to 90 2x10 2x10 1# 2x10 1# 2x10 1# 2x10 1# 2x10 1# 2x10 1# 2x10 1# 2x10    Sidelying abd  2x10 2x10 2x10 2x10 D/c       Sidelying ER  2x10 1# 2x10 1#  3x10 1# 2x10 D/c       CC lat pulldown      44# 2x10 55# 2x10 55# 2x10 55# 2x10    CC row      44# 2x10 55# 2x10 55# 2x10 55# 2x10    ER stretch w/ cane as fulcrum         5\"x10                                           Ther Activity                                       Gait Training                                       Modalities                                              "

## 2024-08-08 NOTE — LETTER
2024    Moise Barr MD  [address] [facility]  [place] [address]    Patient: Joanie Dobbins   YOB: 1970   Date of Visit: 2024     Encounter Diagnosis     ICD-10-CM    1. Sprain of right rotator cuff capsule, subsequent encounter  S43.421D           Dear Dr. Barr:    Thank you for your recent referral of Joanie Dobbins. Please review the attached evaluation summary from Joanie's recent visit.     Please verify that you agree with the plan of care by signing the attached order.     If you have any questions or concerns, please do not hesitate to call.     I sincerely appreciate the opportunity to share in the care of one of your patients and hope to have another opportunity to work with you in the near future.       Sincerely,    Adiel Rudd      Referring Provider:      I certify that I have read the below Plan of Care and certify the need for these services furnished under this plan of treatment while under my care.                    Moise Barr MD  [address] [facility]  [place] [address]  Via Fax: [phone]          PT Re-Evaluation     Today's date: 2024  Patient name: Joanie Dobbins  : 1970  MRN: [id number]  Referring provider: Moise Barr MD  Dx:   Encounter Diagnosis     ICD-10-CM    1. Sprain of right rotator cuff capsule, subsequent encounter  S43.421D                      Assessment  Impairments: abnormal or restricted ROM, activity intolerance, impaired physical strength, lacks appropriate home exercise program and pain with function    Assessment details: Joanie Dobbins is a pleasant 53 y.o. female who presents 6 weeks s/p R RTC repair on 2024. She presents with decreased R shoulder ROM. R shoulder strength not tested today secondary to surgery 6 weeks ago - will test when appropriate. These impairments and precautions following surgery are limiting her with dressing, doing her hair, typing at the computer, reaching  overhead, yoga, and household chores. These signs and symptoms are consistent with RTC repair. She will benefit from skilled PT to address impairments and return to PLOF. No referral necessary at this time.    7/18/2024: Patient presents 15 weeks s/p R RTC repair. She has full R shoulder ROM with pain at endrange abduction. She has decreased strength especially in abduction and flexion. These impairments are limiting her with positions in yoga involving weight bearing and reaching/lifting overhead. Will benefit from skilled PT to address impairments and return to PLOF    8/8/2024: Patient presents 18 weeks s/p R RTC repair. Continues to have full shoulder ROM but has pain at endrange abduction and endrange ER. No change in strength since last re-evaluation as it was only 3 weeks ago. Primarily limited in abduction and flexion strength. These impairments are limiting her with lifting, reaching overhead, yoga poses. She will benefit from skilled PT to address impairments and return to PLOF      Prognosis details: Positive prognostic indicators include positive attitude toward recovery, motivated to improve, high self-efficacy, good understanding of condition, realistic expectations.  Negative prognostic indicators include chronicity of symptoms, high symptom irritability    Goals  STG to be achieved in 6 weeks  Patient will have improved R shoulder ROM in all planes to WNLs (180 deg abd and flex, 90 deg ER) - met  Patient will have improved gross R shoulder strength to 5/5 - good progress; ongoing  Able to dress without difficulty - met  Patient will be independent with HEP. - met    LTG to be achieved in 12 weeks  Patient will be able to resume yoga with no restrictions - good progress; ongoing  Patient will be able to perform all household chores and ADLs - good progress; ongoing      Plan  Patient would benefit from: skilled physical therapy  Planned modality interventions: cryotherapy and thermotherapy:  hydrocollator packs    Planned therapy interventions: home exercise program, functional ROM exercises, body mechanics training, joint mobilization, manual therapy, neuromuscular re-education, therapeutic exercise, therapeutic activities, stretching, strengthening, patient education and flexibility    Frequency: 1x week  Plan of Care beginning date: 2024  Plan of Care expiration date: 2024  Treatment plan discussed with: patient        Subjective Evaluation    History of Present Illness  Date of surgery: 2024  Mechanism of injury: Patient has had chronic R shoulder pain - she did PT prior to surgery but this did not help. She had R RTC repair on 2024. She stopped wearing the sling at 3 weeks post op. She has difficulty pulling up her pants, typing at the computer for long periods of time, dressing, doing her hair, household chores, and fully participating in yoga.     She returned to work a few days after the surgery.     Follow up with Dr. Barr on .     2024: Patient notes she has been doing well overall. She has been doing yoga but still has trouble with some positions involving putting weight through her R arm. She no longer has difficulty with doing her hair, dressing, typing on the computer. Most trouble with lifting overhead. Follow up with Dr. Barr is on 2024: Patient reports doing well overall. She has most pain when reaching behind her or overhead. She has trouble with some yoga positions still.   Patient Goals  Patient goal: Patient would like to return to yoga without limitations, perform ADLs.  Pain  Current pain ratin  At best pain ratin  At worst pain ratin  Relieving factors: ice          Objective     Active Range of Motion   Left Shoulder   Normal active range of motion    Right Shoulder   Flexion: 180 degrees   Abduction: 180 degrees   External rotation 90°: 65 degrees  Internal rotation 90°: 65 degrees     Passive Range of Motion   Left  "Shoulder   Normal passive range of motion    Right Shoulder   Flexion: WFL  Abduction: WFL and with pain    Additional Passive Range of Motion Details  Empty endfeel during PROM due to pain    Strength/Myotome Testing     Left Shoulder   Normal muscle strength    Right Shoulder     Planes of Motion   Flexion: 4+   Abduction: 4   External rotation at 0°: 4+   Internal rotation at 0°: 5                DOS: 4/2/2024; R RTC repair.   Precautions: AROM shoulder ROM to 90 deg; AAROM and PROM within tolerance, periscap exercises; 1 pound weight resitriction  Functional Limitations: dressing, doing her hair, typing at a computer for long periods of time, yoga, household chores  Impairments: R shoulder ROM; R shoulder strength not tested today  [employer] Code: [email or web address]  POC expiration: 9/19/2024    Manuals 6/4 6/10 6/19 6/27 7/2 7/18 7/25 8/1 8/8    R shoulder PROM   No IR until week of 5/28 10' 8' 8' 8' 8' 8' 8' 8'  8'    STM to anterior R shoulder and proximal bicep 4' 4' 4' 4' 4' 4' 4' 4' 4'                              Neuro Re-Ed             TB row GTB 2x10            TB shld ext GTB 2x10      GTB 2x10 GTB 2x10     TB serratus flex      OTB 2x10 OTB 2x10 OTB 2x10 OTB 2x10    TB horz abd      OTB 2x10 OTB 2x10 OTB 2x10     Bilateral TB ER   OTB 2x10 OTB 2x10 OTB 2x10 OTB 2x10 OTB 2x10 OTB 2x10 OTB 2x10    [place] ball on wall X20  Cw/ccw 20 cw/ccw 20 cw/ccw 20 cw/ccw 20 cw/ccw 20 cw/ccw overhead 20 cw/ccw overhead 20 cw/ccw overhead     Side plank on R side      20\"x3       Weight shifts EOT x20 x20 D/c          EOT push up plus   10x 12x 15x D/c Pushups EOT 20x Pushups EOT 20x     Ther Ex             Pulleys flex and scap Scap 2' Scap 2' D/c          Supine cane flex 5\"x10  D/c          Supine cane ER 5\"x10  D/c          Posterior capsule stretch   20\"x3 20\"x3 20\"x3 HEP        Bent over row 10x2 1# 2x10 2# 2x10 2#  3x10 2# 2x10        Bent over shld ext 10x2 1# 2x10 2# 2x10 2#  3x10 2# 2x10        OHP   1# 2x10 1# 2x10 " "1# 2x10 1# 2x10 1# 2x10 1# 2x10 1# 2x10    Standing shld flex to 90 2x10 1# 2x10 1# 2x10 1# 2x10 1# 2x10 2# 2x10 2# 2x10 1# 2x10 2# 2x10    Standing shld abd to 90 2x10 2x10 1# 2x10 1# 2x10 1# 2x10 1# 2x10 1# 2x10 1# 2x10 1# 2x10    Sidelying abd  2x10 2x10 2x10 2x10 D/c       Sidelying ER  2x10 1# 2x10 1#  3x10 1# 2x10 D/c       CC lat pulldown      44# 2x10 55# 2x10 55# 2x10 55# 2x10    CC row      44# 2x10 55# 2x10 55# 2x10 55# 2x10    ER stretch w/ cane as fulcrum         5\"x10                                           Ther Activity                                       Gait Training                                       Modalities                                                              "

## 2024-08-15 ENCOUNTER — APPOINTMENT (OUTPATIENT)
Dept: PHYSICAL THERAPY | Facility: CLINIC | Age: 54
End: 2024-08-15
Payer: COMMERCIAL

## 2024-08-20 DIAGNOSIS — F32.A DEPRESSION, UNSPECIFIED DEPRESSION TYPE: ICD-10-CM

## 2024-08-20 DIAGNOSIS — F41.9 ANXIETY: ICD-10-CM

## 2024-08-20 DIAGNOSIS — G47.00 INSOMNIA, UNSPECIFIED TYPE: ICD-10-CM

## 2024-08-20 DIAGNOSIS — F98.8 ATTENTION DEFICIT DISORDER, UNSPECIFIED HYPERACTIVITY PRESENCE: ICD-10-CM

## 2024-08-20 DIAGNOSIS — F48.9 MENTAL HEALTH PROBLEM: ICD-10-CM

## 2024-08-23 RX ORDER — CLONAZEPAM 0.5 MG/1
0.5 TABLET ORAL 2 TIMES DAILY PRN
Qty: 30 TABLET | Refills: 0 | Status: SHIPPED | OUTPATIENT
Start: 2024-08-23

## 2024-08-23 RX ORDER — QUETIAPINE FUMARATE 50 MG/1
50 TABLET, FILM COATED ORAL
Qty: 90 TABLET | Refills: 0 | Status: SHIPPED | OUTPATIENT
Start: 2024-08-23

## 2024-08-23 RX ORDER — DEXTROAMPHETAMINE SACCHARATE, AMPHETAMINE ASPARTATE, DEXTROAMPHETAMINE SULFATE AND AMPHETAMINE SULFATE 5; 5; 5; 5 MG/1; MG/1; MG/1; MG/1
20 TABLET ORAL
Qty: 60 TABLET | Refills: 0 | Status: SHIPPED | OUTPATIENT
Start: 2024-08-23

## 2024-08-23 RX ORDER — ZOLPIDEM TARTRATE 12.5 MG/1
12.5 TABLET, FILM COATED, EXTENDED RELEASE ORAL
Qty: 90 TABLET | Refills: 0 | Status: SHIPPED | OUTPATIENT
Start: 2024-08-23

## 2024-08-28 ENCOUNTER — OFFICE VISIT (OUTPATIENT)
Dept: PHYSICAL THERAPY | Facility: CLINIC | Age: 54
End: 2024-08-28
Payer: COMMERCIAL

## 2024-08-28 DIAGNOSIS — S43.421D SPRAIN OF RIGHT ROTATOR CUFF CAPSULE, SUBSEQUENT ENCOUNTER: Primary | ICD-10-CM

## 2024-08-28 PROCEDURE — 97110 THERAPEUTIC EXERCISES: CPT

## 2024-08-28 PROCEDURE — 97140 MANUAL THERAPY 1/> REGIONS: CPT

## 2024-08-28 NOTE — PROGRESS NOTES
"Daily Note     Today's date: 2024  Patient name: Joanie Dobbins  : 1970  MRN: [id number]  Referring provider: Moise Barr MD  Dx:   Encounter Diagnosis     ICD-10-CM    1. Sprain of right rotator cuff capsule, subsequent encounter  S43.421D                      Subjective: Patient reports shoulder is doing well. She went kayaking last week - noted some muscle soreness with this but no pain      Objective: See treatment diary below      Assessment: Patient tolerated treatment well overall. Progressed weight for shoulder strengthening for OHP, flexion, and abduction. She will benefit from skilled PT to address impairments and return to PLOF      Plan: progress R shoulder strength     DOS: 2024; R RTC repair.   Precautions: AROM shoulder ROM to 90 deg; AAROM and PROM within tolerance, periscap exercises; 1 pound weight resitriction  Functional Limitations: dressing, doing her hair, typing at a computer for long periods of time, yoga, household chores  Impairments: R shoulder ROM; R shoulder strength not tested today  Med[employer] Code: [email or web address]  POC expiration: 2024    Manuals     R shoulder PROM   No IR until week of  8' 8' 8' 8' 8'  8' 6'    STM to anterior R shoulder and proximal bicep 4' 4' 4' 4' 4' 4' 4'                          Neuro Re-Ed           TB row           TB shld ext    GTB 2x10 GTB 2x10      TB serratus flex   OTB 2x10 OTB 2x10 OTB 2x10 OTB 2x10 OTB 2x10    TB horz abd   OTB 2x10 OTB 2x10 OTB 2x10      Bilateral TB ER OTB 2x10 OTB 2x10 OTB 2x10 OTB 2x10 OTB 2x10 OTB 2x10 OTB 2x10    [place] ball on wall 20 cw/ccw 20 cw/ccw 20 cw/ccw overhead 20 cw/ccw overhead 20 cw/ccw overhead  20 cw/ccw overhead    Side plank on R side   20\"x3        Weight shifts EOT           EOT push up plus 12x 15x D/c Pushups EOT 20x Pushups EOT 20x      Ther Ex           Pulleys flex and scap           Supine cane flex           Supine cane ER           Posterior " "capsule stretch 20\"x3 20\"x3 HEP         Bent over row 2#  3x10 2# 2x10         Bent over shld ext 2#  3x10 2# 2x10         OHP 1# 2x10 1# 2x10 1# 2x10 1# 2x10 1# 2x10 1# 2x10 2# x10  1# x10    Standing shld flex to 90 1# 2x10 1# 2x10 2# 2x10 2# 2x10 1# 2x10 2# 2x10 2# 2x10    Standing shld abd to 90 1# 2x10 1# 2x10 1# 2x10 1# 2x10 1# 2x10 1# 2x10 2# 2x10    Sidelying abd 2x10 2x10 D/c        Sidelying ER 1#  3x10 1# 2x10 D/c        CC lat pulldown   44# 2x10 55# 2x10 55# 2x10 55# 2x10 55# 2x10    CC row   44# 2x10 55# 2x10 55# 2x10 55# 2x10 55# 2x10    ER stretch w/ cane as fulcrum      5\"x10                                      Ther Activity                                 Gait Training                                 Modalities                                        "

## 2024-09-11 ENCOUNTER — OFFICE VISIT (OUTPATIENT)
Dept: URGENT CARE | Facility: CLINIC | Age: 54
End: 2024-09-11
Payer: COMMERCIAL

## 2024-09-11 VITALS
TEMPERATURE: 99.3 F | HEIGHT: 61 IN | OXYGEN SATURATION: 98 % | HEART RATE: 85 BPM | SYSTOLIC BLOOD PRESSURE: 120 MMHG | RESPIRATION RATE: 16 BRPM | BODY MASS INDEX: 28.7 KG/M2 | WEIGHT: 152 LBS | DIASTOLIC BLOOD PRESSURE: 76 MMHG

## 2024-09-11 DIAGNOSIS — H10.232 SEROUS CONJUNCTIVITIS OF LEFT EYE: Primary | ICD-10-CM

## 2024-09-11 PROCEDURE — 99213 OFFICE O/P EST LOW 20 MIN: CPT | Performed by: FAMILY MEDICINE

## 2024-09-11 RX ORDER — OFLOXACIN 3 MG/ML
1 SOLUTION/ DROPS OPHTHALMIC 4 TIMES DAILY
Qty: 5 ML | Refills: 0 | Status: SHIPPED | OUTPATIENT
Start: 2024-09-11 | End: 2024-09-14

## 2024-09-11 NOTE — PROGRESS NOTES
[facility]        NAME: Joanie Dobbins is a 54 y.o. female  : 1970    MRN: [id number]  DATE: 2024  TIME: 11:19 AM    Assessment and Plan   Serous conjunctivitis of left eye [H10.232]  1. Serous conjunctivitis of left eye  ofloxacin (OCUFLOX) 0.3 % ophthalmic solution            Patient Instructions       Follow up with PCP in 3-5 days.  Proceed to  ER if symptoms worsen.    If tests have been performed at [facility], our office will contact you with results if changes need to be made to the care plan discussed with you at the visit.  You can review your full results on [facility].    Chief Complaint     Chief Complaint   Patient presents with    Eye Problem     Pt presents with feeling of something in the eye, with clear discharge and itchiness since waking up this morning.  No recent illness.         History of Present Illness       54-year-old female presenting with redness and watery discharge from her left eye beginning this morning.    Eye Problem   Associated symptoms include an eye discharge, eye redness and itching.       Review of Systems   Review of Systems   Constitutional: Negative.    HENT: Negative.     Eyes:  Positive for discharge, redness and itching.   Respiratory: Negative.     Cardiovascular: Negative.    Gastrointestinal: Negative.    Genitourinary: Negative.    Skin: Negative.    Allergic/Immunologic: Negative.    Neurological: Negative.    Hematological: Negative.    Psychiatric/Behavioral: Negative.           Current Medications       Current Outpatient Medications:     amphetamine-dextroamphetamine (ADDERALL, 20MG,) 20 mg tablet, Take 1 tablet (20 mg total) by mouth 2 (two) times a day Max Daily Amount: 40 mg, Disp: 60 tablet, Rfl: 0    clonazePAM (KlonoPIN) 0.5 mg tablet, Take 1 tablet (0.5 mg total) by mouth 2 (two) times a day as needed for anxiety, Disp: 30 tablet, Rfl: 0    furosemide (LASIX) 20 mg tablet, Take 1 tablet (20 mg total) by mouth  daily, Disp: 100 tablet, Rfl: 1    Multiple Vitamins-Minerals (MULTIVITAL-M) TABS, Take 1 tablet by mouth daily, Disp: , Rfl:     naloxone (NARCAN) 4 mg/0.1 mL nasal spray, Administer 1 spray into a nostril. If no response after 2-3 minutes, give another dose in the other nostril using a new spray., Disp: 1 each, Rfl: 0    NIFEdipine (PROCARDIA XL) 60 mg 24 hr tablet, Take 1 tablet (60 mg total) by mouth daily, Disp: 100 tablet, Rfl: 1    ofloxacin (OCUFLOX) 0.3 % ophthalmic solution, Administer 1 drop into the left eye 4 (four) times a day for 3 days, Disp: 5 mL, Rfl: 0    SUBOXONE 8-2 MG, TAKE ONE TO ONE & ONE-HALF films UNDER THE TONGUE EVERY DAY, Disp: , Rfl: 0    triamterene-hydrochlorothiazide (MAXZIDE-25) 37.5-25 mg per tablet, Take 1 tablet by mouth daily, Disp: 90 tablet, Rfl: 1    zolpidem (AMBIEN CR) 12.5 MG CR tablet, Take 1 tablet (12.5 mg total) by mouth daily at bedtime, Disp: 90 tablet, Rfl: 0    QUEtiapine (SEROquel) 50 mg tablet, Take 1 tablet (50 mg total) by mouth daily at bedtime (Patient not taking: Reported on 9/11/2024), Disp: 90 tablet, Rfl: 0    Current Allergies     Allergies as of 09/11/2024 - Reviewed 09/11/2024   Allergen Reaction Noted    Shellfish allergy - food allergy Anaphylaxis and Other (See Comments) 02/08/2016    Nsaids Other (See Comments) 02/08/2016    Latex Hives and Rash 02/08/2016            The following portions of the patient's history were reviewed and updated as appropriate: allergies, current medications, past family history, past medical history, past social history, past surgical history and problem list.     Past Medical History:   Diagnosis Date    Anxiety     CHF (congestive heart failure) (HCC)     Hypertension     Insomnia        Past Surgical History:   Procedure Laterality Date    APPENDECTOMY      BACK SURGERY      L4-5 fusion; cervical fusion    BREAST SURGERY      CHOLECYSTECTOMY      FL INJECTION RIGHT SHOULDER (ARTHROGRAM)  3/5/2024    GASTRIC BYPASS    "      Family History   Problem Relation Age of Onset    Alzheimer's disease Mother     Cancer Father             Substance Abuse Neg Hx     Mental illness Neg Hx          Medications have been verified.        Objective   /76   Pulse 85   Temp 99.3 °F (37.4 °C)   Resp 16   Ht 5' 1\" (1.549 m)   Wt 68.9 kg (152 lb)   LMP  (LMP Unknown)   SpO2 98%   BMI 28.72 kg/m²   No LMP recorded (lmp unknown). Patient is postmenopausal.       Physical Exam     Physical Exam  Vitals and nursing note reviewed.   Constitutional:       Appearance: She is well-developed.   HENT:      Head: Normocephalic.      Nose: Nose normal.   Eyes:      General:         Left eye: Discharge present.     Conjunctiva/sclera:      Left eye: Left conjunctiva is injected.      Pupils: Pupils are equal, round, and reactive to light.   Cardiovascular:      Rate and Rhythm: Normal rate and regular rhythm.   Pulmonary:      Effort: Pulmonary effort is normal.   Abdominal:      General: Abdomen is flat.   Musculoskeletal:         General: Normal range of motion.      Cervical back: Normal range of motion.   Skin:     General: Skin is warm and dry.   Neurological:      Mental Status: She is alert and oriented to person, place, and time.                   "

## 2024-09-25 DIAGNOSIS — F98.8 ATTENTION DEFICIT DISORDER, UNSPECIFIED HYPERACTIVITY PRESENCE: ICD-10-CM

## 2024-09-25 DIAGNOSIS — F41.9 ANXIETY: ICD-10-CM

## 2024-09-25 DIAGNOSIS — F32.A DEPRESSION, UNSPECIFIED DEPRESSION TYPE: ICD-10-CM

## 2024-09-25 DIAGNOSIS — F48.9 MENTAL HEALTH PROBLEM: ICD-10-CM

## 2024-09-25 DIAGNOSIS — G47.00 INSOMNIA, UNSPECIFIED TYPE: ICD-10-CM

## 2024-09-25 RX ORDER — QUETIAPINE FUMARATE 50 MG/1
50 TABLET, FILM COATED ORAL
Qty: 90 TABLET | Refills: 0 | Status: SHIPPED | OUTPATIENT
Start: 2024-09-25

## 2024-09-25 RX ORDER — CLONAZEPAM 0.5 MG/1
0.5 TABLET ORAL 2 TIMES DAILY PRN
Qty: 30 TABLET | Refills: 0 | Status: SHIPPED | OUTPATIENT
Start: 2024-09-25

## 2024-09-25 RX ORDER — DEXTROAMPHETAMINE SACCHARATE, AMPHETAMINE ASPARTATE, DEXTROAMPHETAMINE SULFATE AND AMPHETAMINE SULFATE 5; 5; 5; 5 MG/1; MG/1; MG/1; MG/1
20 TABLET ORAL
Qty: 60 TABLET | Refills: 0 | Status: SHIPPED | OUTPATIENT
Start: 2024-09-25

## 2024-10-11 PROBLEM — H10.232 SEROUS CONJUNCTIVITIS OF LEFT EYE: Status: RESOLVED | Noted: 2024-09-11 | Resolved: 2024-10-11

## 2024-10-18 DIAGNOSIS — F98.8 ATTENTION DEFICIT DISORDER, UNSPECIFIED HYPERACTIVITY PRESENCE: ICD-10-CM

## 2024-10-22 RX ORDER — DEXTROAMPHETAMINE SACCHARATE, AMPHETAMINE ASPARTATE, DEXTROAMPHETAMINE SULFATE AND AMPHETAMINE SULFATE 5; 5; 5; 5 MG/1; MG/1; MG/1; MG/1
20 TABLET ORAL
Qty: 60 TABLET | Refills: 0 | Status: SHIPPED | OUTPATIENT
Start: 2024-10-22

## 2024-11-03 DIAGNOSIS — I15.9 SECONDARY HYPERTENSION: ICD-10-CM

## 2024-11-04 RX ORDER — TRIAMTERENE AND HYDROCHLOROTHIAZIDE 37.5; 25 MG/1; MG/1
1 TABLET ORAL DAILY
Qty: 90 TABLET | Refills: 1 | Status: SHIPPED | OUTPATIENT
Start: 2024-11-04

## 2024-11-11 DIAGNOSIS — G47.00 INSOMNIA, UNSPECIFIED TYPE: ICD-10-CM

## 2024-11-11 DIAGNOSIS — F98.8 ATTENTION DEFICIT DISORDER, UNSPECIFIED HYPERACTIVITY PRESENCE: ICD-10-CM

## 2024-11-11 DIAGNOSIS — F41.9 ANXIETY: ICD-10-CM

## 2024-11-11 DIAGNOSIS — F32.A DEPRESSION, UNSPECIFIED DEPRESSION TYPE: ICD-10-CM

## 2024-11-11 DIAGNOSIS — F48.9 MENTAL HEALTH PROBLEM: ICD-10-CM

## 2024-11-12 RX ORDER — ZOLPIDEM TARTRATE 12.5 MG/1
12.5 TABLET, FILM COATED, EXTENDED RELEASE ORAL
Qty: 90 TABLET | Refills: 0 | Status: SHIPPED | OUTPATIENT
Start: 2024-11-12

## 2024-11-12 RX ORDER — DEXTROAMPHETAMINE SACCHARATE, AMPHETAMINE ASPARTATE, DEXTROAMPHETAMINE SULFATE AND AMPHETAMINE SULFATE 5; 5; 5; 5 MG/1; MG/1; MG/1; MG/1
20 TABLET ORAL
Qty: 60 TABLET | Refills: 0 | Status: SHIPPED | OUTPATIENT
Start: 2024-11-12

## 2024-11-12 RX ORDER — CLONAZEPAM 0.5 MG/1
0.5 TABLET ORAL 2 TIMES DAILY PRN
Qty: 30 TABLET | Refills: 0 | Status: SHIPPED | OUTPATIENT
Start: 2024-11-12

## 2024-11-12 RX ORDER — QUETIAPINE FUMARATE 50 MG/1
50 TABLET, FILM COATED ORAL
Qty: 90 TABLET | Refills: 0 | Status: SHIPPED | OUTPATIENT
Start: 2024-11-12

## 2024-12-02 DIAGNOSIS — F98.8 ATTENTION DEFICIT DISORDER, UNSPECIFIED HYPERACTIVITY PRESENCE: ICD-10-CM

## 2024-12-04 RX ORDER — DEXTROAMPHETAMINE SACCHARATE, AMPHETAMINE ASPARTATE, DEXTROAMPHETAMINE SULFATE AND AMPHETAMINE SULFATE 5; 5; 5; 5 MG/1; MG/1; MG/1; MG/1
20 TABLET ORAL
Qty: 60 TABLET | Refills: 0 | Status: SHIPPED | OUTPATIENT
Start: 2024-12-04

## 2025-01-08 DIAGNOSIS — F98.8 ATTENTION DEFICIT DISORDER, UNSPECIFIED HYPERACTIVITY PRESENCE: ICD-10-CM

## 2025-01-09 RX ORDER — DEXTROAMPHETAMINE SACCHARATE, AMPHETAMINE ASPARTATE, DEXTROAMPHETAMINE SULFATE AND AMPHETAMINE SULFATE 5; 5; 5; 5 MG/1; MG/1; MG/1; MG/1
20 TABLET ORAL
Qty: 60 TABLET | Refills: 0 | Status: SHIPPED | OUTPATIENT
Start: 2025-01-09

## 2025-01-14 DIAGNOSIS — I15.9 SECONDARY HYPERTENSION: ICD-10-CM

## 2025-01-14 DIAGNOSIS — R60.0 PERIPHERAL EDEMA: ICD-10-CM

## 2025-01-14 DIAGNOSIS — I10 BENIGN ESSENTIAL HTN: ICD-10-CM

## 2025-01-14 RX ORDER — FUROSEMIDE 20 MG/1
20 TABLET ORAL DAILY
Qty: 90 TABLET | Refills: 1 | Status: SHIPPED | OUTPATIENT
Start: 2025-01-14

## 2025-01-14 RX ORDER — NIFEDIPINE 60 MG/1
60 TABLET, EXTENDED RELEASE ORAL DAILY
Qty: 90 TABLET | Refills: 1 | Status: SHIPPED | OUTPATIENT
Start: 2025-01-14

## 2025-01-23 DIAGNOSIS — F32.A DEPRESSION, UNSPECIFIED DEPRESSION TYPE: ICD-10-CM

## 2025-01-23 DIAGNOSIS — F48.9 MENTAL HEALTH PROBLEM: ICD-10-CM

## 2025-01-23 DIAGNOSIS — F41.9 ANXIETY: ICD-10-CM

## 2025-01-23 DIAGNOSIS — G47.00 INSOMNIA, UNSPECIFIED TYPE: ICD-10-CM

## 2025-01-24 RX ORDER — CLONAZEPAM 0.5 MG/1
0.5 TABLET ORAL 2 TIMES DAILY PRN
Qty: 30 TABLET | Refills: 0 | Status: SHIPPED | OUTPATIENT
Start: 2025-01-24

## 2025-01-24 RX ORDER — QUETIAPINE FUMARATE 50 MG/1
50 TABLET, FILM COATED ORAL
Qty: 90 TABLET | Refills: 0 | Status: SHIPPED | OUTPATIENT
Start: 2025-01-24

## 2025-01-24 RX ORDER — ZOLPIDEM TARTRATE 12.5 MG/1
12.5 TABLET, FILM COATED, EXTENDED RELEASE ORAL
Qty: 90 TABLET | Refills: 0 | Status: SHIPPED | OUTPATIENT
Start: 2025-01-24

## 2025-02-02 DIAGNOSIS — F98.8 ATTENTION DEFICIT DISORDER, UNSPECIFIED HYPERACTIVITY PRESENCE: ICD-10-CM

## 2025-02-02 DIAGNOSIS — F48.9 MENTAL HEALTH PROBLEM: ICD-10-CM

## 2025-02-03 RX ORDER — QUETIAPINE FUMARATE 50 MG/1
50 TABLET, FILM COATED ORAL
Qty: 90 TABLET | Refills: 0 | Status: SHIPPED | OUTPATIENT
Start: 2025-02-03

## 2025-02-03 RX ORDER — DEXTROAMPHETAMINE SACCHARATE, AMPHETAMINE ASPARTATE, DEXTROAMPHETAMINE SULFATE AND AMPHETAMINE SULFATE 5; 5; 5; 5 MG/1; MG/1; MG/1; MG/1
20 TABLET ORAL
Qty: 60 TABLET | Refills: 0 | Status: SHIPPED | OUTPATIENT
Start: 2025-02-03

## 2025-03-06 DIAGNOSIS — F98.8 ATTENTION DEFICIT DISORDER, UNSPECIFIED HYPERACTIVITY PRESENCE: ICD-10-CM

## 2025-03-07 RX ORDER — DEXTROAMPHETAMINE SACCHARATE, AMPHETAMINE ASPARTATE, DEXTROAMPHETAMINE SULFATE AND AMPHETAMINE SULFATE 5; 5; 5; 5 MG/1; MG/1; MG/1; MG/1
20 TABLET ORAL
Qty: 60 TABLET | Refills: 0 | Status: SHIPPED | OUTPATIENT
Start: 2025-03-07

## 2025-04-03 DIAGNOSIS — F98.8 ATTENTION DEFICIT DISORDER, UNSPECIFIED HYPERACTIVITY PRESENCE: ICD-10-CM

## 2025-04-04 RX ORDER — DEXTROAMPHETAMINE SACCHARATE, AMPHETAMINE ASPARTATE, DEXTROAMPHETAMINE SULFATE AND AMPHETAMINE SULFATE 5; 5; 5; 5 MG/1; MG/1; MG/1; MG/1
20 TABLET ORAL
Qty: 60 TABLET | Refills: 0 | Status: SHIPPED | OUTPATIENT
Start: 2025-04-04

## 2025-04-07 ENCOUNTER — OFFICE VISIT (OUTPATIENT)
Dept: URGENT CARE | Facility: CLINIC | Age: 55
End: 2025-04-07
Payer: COMMERCIAL

## 2025-04-07 VITALS
DIASTOLIC BLOOD PRESSURE: 72 MMHG | RESPIRATION RATE: 18 BRPM | OXYGEN SATURATION: 98 % | TEMPERATURE: 97.9 F | HEART RATE: 96 BPM | SYSTOLIC BLOOD PRESSURE: 126 MMHG

## 2025-04-07 DIAGNOSIS — B02.9 HERPES ZOSTER WITHOUT COMPLICATION: Primary | ICD-10-CM

## 2025-04-07 PROCEDURE — 99213 OFFICE O/P EST LOW 20 MIN: CPT

## 2025-04-07 RX ORDER — VALACYCLOVIR HYDROCHLORIDE 1 G/1
1000 TABLET, FILM COATED ORAL 3 TIMES DAILY
Qty: 21 TABLET | Refills: 0 | Status: SHIPPED | OUTPATIENT
Start: 2025-04-07 | End: 2025-04-14

## 2025-04-07 RX ORDER — BUPRENORPHINE 8 MG/1
8 TABLET SUBLINGUAL DAILY
COMMUNITY

## 2025-04-07 NOTE — PROGRESS NOTES
[facility]        NAME: Joanie Dobbins is a 54 y.o. female  : 1970    MRN: [id number]  DATE: 2025  TIME: 1:42 PM    Assessment and Plan   Herpes zoster without complication [B02.9]  1. Herpes zoster without complication  valACYclovir (VALTREX) 1,000 mg tablet            Patient Instructions     Take valacyclovir as prescribed.    Keep your rash clean and dry.   Try not to scratch your skin. It might help to cover it with a clean dressing.  Wear loose clothing if this makes you more comfortable.    If you do get shingles, you can prevent spreading the virus to other people if you:   Keep your rash covered.   Wash your hands often until your rash has scabbed over.    Follow-up with PCP in 3-5 days.    Go to the ED for any worsening symptoms:  Your pain gets worse and is not helped by over-the-counter medicines.   You have increased redness or swelling around your rash.   You get a fever.   You have eye symptoms like redness, irritation, or vision problems.   You have ear symptoms like pain or trouble hearing.      If tests are performed, our office will contact you with results only if changes need to made to the care plan discussed with you at the visit. You can review your full results on [facility].      Chief Complaint     Chief Complaint   Patient presents with    Rash     Patient with rash on her R side abdomen. Patient states it started as an achy pain, became burning, and then became itchy. Patient states the areas are itchy, painful, and burning. Patient states she started noticing it on Friday.          History of Present Illness       54-year-old female who presents for evaluation of a rash to the right side of her abdomen. Patient states she noticed an itchy and burning sensation to the skin two days ago. Yesterday she noticed rash develop. Overall she feels fatigued.         Review of Systems   Review of Systems   Constitutional:  Positive for fatigue. Negative for  fever.   Eyes:  Negative for pain and visual disturbance.   Respiratory:  Negative for shortness of breath.    Cardiovascular:  Negative for chest pain.   Gastrointestinal:  Negative for abdominal pain.   Musculoskeletal:  Negative for joint swelling.   Skin:  Positive for rash.   Neurological:  Negative for dizziness and headaches.         Current Medications       Current Outpatient Medications:     valACYclovir (VALTREX) 1,000 mg tablet, Take 1 tablet (1,000 mg total) by mouth 3 (three) times a day for 7 days, Disp: 21 tablet, Rfl: 0    amphetamine-dextroamphetamine (ADDERALL, 20MG,) 20 mg tablet, Take 1 tablet (20 mg total) by mouth 2 (two) times a day Max Daily Amount: 40 mg, Disp: 60 tablet, Rfl: 0    buprenorphine (SUBUTEX) 8 mg, Place 8 mg under the tongue daily, Disp: , Rfl:     clonazePAM (KlonoPIN) 0.5 mg tablet, Take 1 tablet (0.5 mg total) by mouth 2 (two) times a day as needed for anxiety, Disp: 30 tablet, Rfl: 0    furosemide (LASIX) 20 mg tablet, TAKE 1 TABLET BY MOUTH EVERY DAY, Disp: 90 tablet, Rfl: 1    Multiple Vitamins-Minerals (MULTIVITAL-M) TABS, Take 1 tablet by mouth daily, Disp: , Rfl:     naloxone (NARCAN) 4 mg/0.1 mL nasal spray, Administer 1 spray into a nostril. If no response after 2-3 minutes, give another dose in the other nostril using a new spray., Disp: 1 each, Rfl: 0    NIFEdipine (PROCARDIA XL) 60 mg 24 hr tablet, TAKE 1 TABLET BY MOUTH EVERY DAY, Disp: 90 tablet, Rfl: 1    QUEtiapine (SEROquel) 50 mg tablet, Take 1 tablet (50 mg total) by mouth daily at bedtime (Patient not taking: Reported on 4/7/2025), Disp: 90 tablet, Rfl: 0    SUBOXONE 8-2 MG, TAKE ONE TO ONE & ONE-HALF films UNDER THE TONGUE EVERY DAY, Disp: , Rfl: 0    triamterene-hydrochlorothiazide (MAXZIDE-25) 37.5-25 mg per tablet, TAKE 1 TABLET BY MOUTH EVERY DAY, Disp: 90 tablet, Rfl: 1    zolpidem (AMBIEN CR) 12.5 MG CR tablet, Take 1 tablet (12.5 mg total) by mouth daily at bedtime, Disp: 90 tablet, Rfl:  0    Current Allergies     Allergies as of 2025 - Reviewed 2025   Allergen Reaction Noted    Shellfish allergy - food allergy Anaphylaxis and Other (See Comments) 2016    Nsaids Other (See Comments) 2016    Latex Hives and Rash 2016            The following portions of the patient's history were reviewed and updated as appropriate: allergies, current medications, past family history, past medical history, past social history, past surgical history and problem list.     Past Medical History:   Diagnosis Date    Anxiety     CHF (congestive heart failure) (HCC)     Hypertension     Insomnia        Past Surgical History:   Procedure Laterality Date    APPENDECTOMY      BACK SURGERY      L4-5 fusion; cervical fusion    BREAST SURGERY      CHOLECYSTECTOMY      FL INJECTION RIGHT SHOULDER (ARTHROGRAM)  3/5/2024    GASTRIC BYPASS         Family History   Problem Relation Age of Onset    Alzheimer's disease Mother     Cancer Father             Substance Abuse Neg Hx     Mental illness Neg Hx          Medications have been verified.        Objective   /72   Pulse 96   Temp 97.9 °F (36.6 °C)   Resp 18   LMP  (LMP Unknown)   SpO2 98%        Physical Exam     Physical Exam  Vitals and nursing note reviewed.   Constitutional:       General: She is not in acute distress.     Appearance: She is not ill-appearing.   HENT:      Head: Normocephalic and atraumatic.      Mouth/Throat:      Mouth: Mucous membranes are moist.   Eyes:      Conjunctiva/sclera: Conjunctivae normal.   Cardiovascular:      Rate and Rhythm: Normal rate and regular rhythm.      Pulses: Normal pulses.      Heart sounds: Normal heart sounds.   Pulmonary:      Effort: Pulmonary effort is normal.      Breath sounds: Normal breath sounds.   Musculoskeletal:         General: Normal range of motion.      Cervical back: Normal range of motion and neck supple.   Skin:     General: Skin is warm and dry.      Findings:  Erythema and rash present. Rash is vesicular.             Comments: Follows dermatome, does not cross midline.   Neurological:      Mental Status: She is alert and oriented to person, place, and time.

## 2025-04-07 NOTE — PATIENT INSTRUCTIONS
Take valacyclovir as prescribed.    Keep your rash clean and dry.   Try not to scratch your skin. It might help to cover it with a clean dressing.  Wear loose clothing if this makes you more comfortable.    If you do get shingles, you can prevent spreading the virus to other people if you:   Keep your rash covered.   Wash your hands often until your rash has scabbed over.    Follow-up with PCP in 3-5 days.    Go to the ED for any worsening symptoms:  Your pain gets worse and is not helped by over-the-counter medicines.   You have increased redness or swelling around your rash.   You get a fever.   You have eye symptoms like redness, irritation, or vision problems.   You have ear symptoms like pain or trouble hearing.

## 2025-04-17 DIAGNOSIS — F41.9 ANXIETY: ICD-10-CM

## 2025-04-17 DIAGNOSIS — F98.8 ATTENTION DEFICIT DISORDER, UNSPECIFIED HYPERACTIVITY PRESENCE: ICD-10-CM

## 2025-04-17 DIAGNOSIS — I15.9 SECONDARY HYPERTENSION: ICD-10-CM

## 2025-04-17 DIAGNOSIS — B02.9 HERPES ZOSTER WITHOUT COMPLICATION: ICD-10-CM

## 2025-04-17 DIAGNOSIS — G47.00 INSOMNIA, UNSPECIFIED TYPE: ICD-10-CM

## 2025-04-17 DIAGNOSIS — F32.A DEPRESSION, UNSPECIFIED DEPRESSION TYPE: ICD-10-CM

## 2025-04-23 RX ORDER — DEXTROAMPHETAMINE SACCHARATE, AMPHETAMINE ASPARTATE, DEXTROAMPHETAMINE SULFATE AND AMPHETAMINE SULFATE 5; 5; 5; 5 MG/1; MG/1; MG/1; MG/1
20 TABLET ORAL
Qty: 60 TABLET | Refills: 0 | Status: SHIPPED | OUTPATIENT
Start: 2025-04-23

## 2025-04-23 RX ORDER — ZOLPIDEM TARTRATE 12.5 MG/1
12.5 TABLET, FILM COATED, EXTENDED RELEASE ORAL
Qty: 90 TABLET | Refills: 0 | Status: SHIPPED | OUTPATIENT
Start: 2025-04-23

## 2025-04-23 RX ORDER — TRIAMTERENE AND HYDROCHLOROTHIAZIDE 37.5; 25 MG/1; MG/1
1 TABLET ORAL DAILY
Qty: 90 TABLET | Refills: 0 | Status: SHIPPED | OUTPATIENT
Start: 2025-04-23

## 2025-04-23 RX ORDER — NIFEDIPINE 60 MG/1
60 TABLET, EXTENDED RELEASE ORAL DAILY
Qty: 90 TABLET | Refills: 0 | Status: SHIPPED | OUTPATIENT
Start: 2025-04-23

## 2025-04-23 RX ORDER — VALACYCLOVIR HYDROCHLORIDE 1 G/1
1000 TABLET, FILM COATED ORAL 3 TIMES DAILY
Qty: 21 TABLET | Refills: 0 | Status: SHIPPED | OUTPATIENT
Start: 2025-04-23 | End: 2025-04-30

## 2025-04-23 RX ORDER — CLONAZEPAM 0.5 MG/1
0.5 TABLET ORAL 2 TIMES DAILY PRN
Qty: 30 TABLET | Refills: 0 | Status: SHIPPED | OUTPATIENT
Start: 2025-04-23

## 2025-05-19 DIAGNOSIS — F32.A DEPRESSION, UNSPECIFIED DEPRESSION TYPE: ICD-10-CM

## 2025-05-19 DIAGNOSIS — G47.00 INSOMNIA, UNSPECIFIED TYPE: ICD-10-CM

## 2025-05-19 DIAGNOSIS — F41.9 ANXIETY: ICD-10-CM

## 2025-05-19 DIAGNOSIS — F98.8 ATTENTION DEFICIT DISORDER, UNSPECIFIED HYPERACTIVITY PRESENCE: ICD-10-CM

## 2025-05-20 RX ORDER — DEXTROAMPHETAMINE SACCHARATE, AMPHETAMINE ASPARTATE, DEXTROAMPHETAMINE SULFATE AND AMPHETAMINE SULFATE 5; 5; 5; 5 MG/1; MG/1; MG/1; MG/1
20 TABLET ORAL
Qty: 60 TABLET | Refills: 0 | Status: SHIPPED | OUTPATIENT
Start: 2025-05-20

## 2025-05-20 RX ORDER — CLONAZEPAM 0.5 MG/1
0.5 TABLET ORAL 2 TIMES DAILY PRN
Qty: 30 TABLET | Refills: 0 | Status: SHIPPED | OUTPATIENT
Start: 2025-05-20

## 2025-06-15 DIAGNOSIS — F41.9 ANXIETY: ICD-10-CM

## 2025-06-15 DIAGNOSIS — F98.8 ATTENTION DEFICIT DISORDER, UNSPECIFIED HYPERACTIVITY PRESENCE: ICD-10-CM

## 2025-06-15 DIAGNOSIS — F32.A DEPRESSION, UNSPECIFIED DEPRESSION TYPE: ICD-10-CM

## 2025-06-15 DIAGNOSIS — G47.00 INSOMNIA, UNSPECIFIED TYPE: ICD-10-CM

## 2025-06-16 RX ORDER — CLONAZEPAM 0.5 MG/1
0.5 TABLET ORAL 2 TIMES DAILY PRN
Qty: 30 TABLET | Refills: 0 | Status: SHIPPED | OUTPATIENT
Start: 2025-06-16

## 2025-06-16 RX ORDER — DEXTROAMPHETAMINE SACCHARATE, AMPHETAMINE ASPARTATE, DEXTROAMPHETAMINE SULFATE AND AMPHETAMINE SULFATE 5; 5; 5; 5 MG/1; MG/1; MG/1; MG/1
20 TABLET ORAL
Qty: 60 TABLET | Refills: 0 | Status: SHIPPED | OUTPATIENT
Start: 2025-06-16

## 2025-06-18 NOTE — TELEPHONE ENCOUNTER
Patient called the refill line for these medications. Informed her that the medications were sent to her pharmacy.

## 2025-06-20 NOTE — TELEPHONE ENCOUNTER
Pt calling stating that the office needs to reach out the the pharmacy in regards to her adderall and the clonazepam scripts before she can fill them, pt states they faxed something to the office in regards please call pt when able

## 2025-06-25 ENCOUNTER — OFFICE VISIT (OUTPATIENT)
Dept: FAMILY MEDICINE CLINIC | Facility: HOSPITAL | Age: 55
End: 2025-06-25
Payer: COMMERCIAL

## 2025-06-25 VITALS
SYSTOLIC BLOOD PRESSURE: 112 MMHG | TEMPERATURE: 97.7 F | HEART RATE: 94 BPM | OXYGEN SATURATION: 98 % | WEIGHT: 148.9 LBS | HEIGHT: 61 IN | BODY MASS INDEX: 28.11 KG/M2 | DIASTOLIC BLOOD PRESSURE: 82 MMHG

## 2025-06-25 DIAGNOSIS — Z00.00 ANNUAL PHYSICAL EXAM: Primary | ICD-10-CM

## 2025-06-25 DIAGNOSIS — Z13.6 SCREENING FOR CARDIOVASCULAR CONDITION: ICD-10-CM

## 2025-06-25 DIAGNOSIS — F11.20 CONTINUOUS OPIOID DEPENDENCE (HCC): ICD-10-CM

## 2025-06-25 PROCEDURE — 99396 PREV VISIT EST AGE 40-64: CPT

## 2025-06-25 NOTE — PATIENT INSTRUCTIONS
"Patient Education     Routine physical for adults   The Basics   Written by the doctors and editors at [facility]   What is a physical? -- A physical is a routine visit, or \"check-up,\" with your doctor. You might also hear it called a \"wellness visit\" or \"preventive visit.\"  During each visit, the doctor will:   Ask about your physical and mental health   Ask about your habits, behaviors, and lifestyle   Do an exam   Give you vaccines if needed   Talk to you about any medicines you take   Give advice about your health   Answer your questions  Getting regular check-ups is an important part of taking care of your health. It can help your doctor find and treat any problems you have. But it's also important for preventing health problems.  A routine physical is different from a \"sick visit.\" A sick visit is when you see a doctor because of a health concern or problem. Since physicals are scheduled ahead of time, you can think about what you want to ask the doctor.  How often should I get a physical? -- It depends on your age and health. In general, for people age 21 years and older:   If you are younger than 50 years, you might be able to get a physical every 3 years.   If you are 50 years or older, your doctor might recommend a physical every year.  If you have an ongoing health condition, like diabetes or high blood pressure, your doctor will probably want to see you more often.  What happens during a physical? -- In general, each visit will include:   Physical exam - The doctor or nurse will check your height, weight, heart rate, and blood pressure. They will also look at your eyes and ears. They will ask about how you are feeling and whether you have any symptoms that bother you.   Medicines - It's a good idea to bring a list of all the medicines you take to each doctor visit. Your doctor will talk to you about your medicines and answer any questions. Tell them if you are having any side effects that bother you. You " "should also tell them if you are having trouble paying for any of your medicines.   Habits and behaviors - This includes:   Your diet   Your exercise habits   Whether you smoke, drink alcohol, or use drugs   Whether you are sexually active   Whether you feel safe at home  Your doctor will talk to you about things you can do to improve your health and lower your risk of health problems. They will also offer help and support. For example, if you want to quit smoking, they can give you advice and might prescribe medicines. If you want to improve your diet or get more physical activity, they can help you with this, too.   Lab tests, if needed - The tests you get will depend on your age and situation. For example, your doctor might want to check your:   Cholesterol   Blood sugar   Iron level   Vaccines - The recommended vaccines will depend on your age, health, and what vaccines you already had. Vaccines are very important because they can prevent certain serious or deadly infections.   Discussion of screening - \"Screening\" means checking for diseases or other health problems before they cause symptoms. Your doctor can recommend screening based on your age, risk, and preferences. This might include tests to check for:   Cancer, such as breast, prostate, cervical, ovarian, colorectal, prostate, lung, or skin cancer   Sexually transmitted infections, such as chlamydia and gonorrhea   Mental health conditions like depression and anxiety  Your doctor will talk to you about the different types of screening tests. They can help you decide which screenings to have. They can also explain what the results might mean.   Answering questions - The physical is a good time to ask the doctor or nurse questions about your health. If needed, they can refer you to other doctors or specialists, too.  Adults older than 65 years often need other care, too. As you get older, your doctor will talk to you about:   How to prevent falling at " home   Hearing or vision tests   Memory testing   How to take your medicines safely   Making sure that you have the help and support you need at home  All topics are updated as new evidence becomes available and our peer review process is complete.  This topic retrieved from [employer] on: May 02, 2024.  Topic 593881 Version 1.0  Release: 32.4.3 - C32.122  © 2024 UpToDate, Inc. and/or its affiliates. All rights reserved.  Consumer Information Use and Disclaimer   Disclaimer: This generalized information is a limited summary of diagnosis, treatment, and/or medication information. It is not meant to be comprehensive and should be used as a tool to help the user understand and/or assess potential diagnostic and treatment options. It does NOT include all information about conditions, treatments, medications, side effects, or risks that may apply to a specific patient. It is not intended to be medical advice or a substitute for the medical advice, diagnosis, or treatment of a health care provider based on the health care provider's examination and assessment of a patient's specific and unique circumstances. Patients must speak with a health care provider for complete information about their health, medical questions, and treatment options, including any risks or benefits regarding use of medications. This information does not endorse any treatments or medications as safe, effective, or approved for treating a specific patient. Up[employer]Date, Inc. and its affiliates disclaim any warranty or liability relating to this information or the use thereof.The use of this information is governed by the Terms of Use, available at https://www.woltersDaptuwer.com/en/know/clinical-effectiveness-terms. 2024© UpToDate, Inc. and its affiliates and/or licensors. All rights reserved.  Copyright   © 2024 Up[employer], Inc. and/or its affiliates. All rights reserved.

## 2025-06-25 NOTE — PROGRESS NOTES
Adult Annual Physical  Name: Joanie Dobbins      : 1970      MRN: [id number]  Encounter Provider: Sonia Pearl DO  Encounter Date: 2025   Encounter department: [facility] [address]    :  Assessment & Plan  Annual physical exam         Screening for cardiovascular condition    Orders:    Lipid panel; Future    Hemoglobin A1C; Future    Continuous opioid dependence (HCC)  Controlled, mood stable           Preventive Screenings:  - Diabetes Screening: orders placed  - Cholesterol Screening: orders placed   - Hepatitis C screening: screening up-to-date   - HIV screening: screening up-to-date   - Cervical cancer screening: screening up-to-date   - Breast cancer screening: screening up-to-date   - Colon cancer screening: screening up-to-date   - Lung cancer screening: screening not indicated   - Osteoporosis screening: screening up-to-date     Immunizations:  - Immunizations due: Prevnar 20, Tdap and Zoster (Shingrix)  - The patient declines recommended vaccines currently despite my recommendations           History of Present Illness     Adult Annual Physical:  Patient presents for annual physical.     Diet and Physical Activity:  - Diet/Nutrition: no special diet and well balanced diet.  - Exercise:. Yoga- daily    Depression Screening:  - PHQ-2 Score: 0    General Health:  - Sleep: 7-8 hours of sleep on average and sleeps well.  - Hearing: normal hearing bilateral ears.  - Vision: wears glasses and most recent eye exam > 1 year ago.  - Dental: regular dental visits, brushes teeth twice daily and floss regularly.    /GYN Health:  - Follows with GYN: yes.   - Menopause: postmenopausal.   - History of STDs: no    Preventative Screening  Breast: Last mammogram   Last pap follows with tamiko  Colon: Last colonoscopy cologurard   DXA scan:   DeniedFamily history of breast cancer, colon cancer, prostate cancer  Labs: Hgb A6Ycodiibs, FLP  "ordered    Immunizations  Last TDAP declines  Pneumococcal declines  Shingles declines  COVID 2021      Wt Readings from Last 3 Encounters:   06/25/25 67.5 kg (148 lb 14.4 oz)   09/11/24 68.9 kg (152 lb)   03/21/24 70.3 kg (155 lb)       PHQ-2/9 Depression Screening    Little interest or pleasure in doing things: 0 - not at all  Feeling down, depressed, or hopeless: 0 - not at all  PHQ-2 Score: 0  PHQ-2 Interpretation: Negative depression screen         Family History  reviewed    Allergies  reviewed    Social History  Tobacco - quit 2010  Alcohol - denies  Illicit Drugs - denies        Review of Systems   Respiratory:  Negative for shortness of breath.    Cardiovascular:  Negative for chest pain.   Gastrointestinal:  Negative for abdominal pain, nausea and vomiting.   Skin:  Negative for rash.   Allergic/Immunologic: Positive for food allergies. Negative for environmental allergies.   Psychiatric/Behavioral:  Negative for sleep disturbance.      Medications Ordered Prior to Encounter[1]   Social History[2]    Objective   /82   Pulse 94   Temp 97.7 °F (36.5 °C) (Tympanic)   Ht 5' 1\" (1.549 m)   Wt 67.5 kg (148 lb 14.4 oz)   LMP  (LMP Unknown)   SpO2 98%   BMI 28.13 kg/m²     Physical Exam  Vitals reviewed.   Constitutional:       General: She is not in acute distress.     Appearance: Normal appearance. She is well-developed. She is not ill-appearing.   HENT:      Head: Normocephalic and atraumatic.      Right Ear: Tympanic membrane, ear canal and external ear normal. There is no impacted cerumen.      Left Ear: Tympanic membrane, ear canal and external ear normal. There is no impacted cerumen.      Mouth/Throat:      Mouth: Mucous membranes are moist.     Eyes:      Conjunctiva/sclera: Conjunctivae normal.       Cardiovascular:      Rate and Rhythm: Normal rate and regular rhythm.      Heart sounds: Normal heart sounds.   Pulmonary:      Effort: Pulmonary effort is normal.      Breath sounds: Normal " breath sounds.     Musculoskeletal:         General: Normal range of motion.     Skin:     General: Skin is warm and dry.     Neurological:      Mental Status: She is alert.      Gait: Gait normal.     Psychiatric:         Mood and Affect: Mood normal.         Behavior: Behavior normal.         Sonia Pearl DO  Family Medicine         [1]   Current Outpatient Medications on File Prior to Visit   Medication Sig Dispense Refill    amphetamine-dextroamphetamine (ADDERALL, 20MG,) 20 mg tablet Take 1 tablet (20 mg total) by mouth 2 (two) times a day Max Daily Amount: 40 mg 60 tablet 0    clonazePAM (KlonoPIN) 0.5 mg tablet Take 1 tablet (0.5 mg total) by mouth 2 (two) times a day as needed for anxiety 30 tablet 0    furosemide (LASIX) 20 mg tablet TAKE 1 TABLET BY MOUTH EVERY DAY (Patient taking differently: Take 20 mg by mouth in the morning. Pt is taking PRN .) 90 tablet 1    Multiple Vitamins-Minerals (MULTIVITAL-M) TABS Take 1 tablet by mouth in the morning.      naloxone (NARCAN) 4 mg/0.1 mL nasal spray Administer 1 spray into a nostril. If no response after 2-3 minutes, give another dose in the other nostril using a new spray. 1 each 0    NIFEdipine (PROCARDIA XL) 60 mg 24 hr tablet Take 1 tablet (60 mg total) by mouth daily 90 tablet 0    SUBOXONE 8-2 MG   0    triamterene-hydrochlorothiazide (MAXZIDE-25) 37.5-25 mg per tablet Take 1 tablet by mouth daily 90 tablet 0    zolpidem (AMBIEN CR) 12.5 MG CR tablet Take 1 tablet (12.5 mg total) by mouth daily at bedtime 90 tablet 0    buprenorphine (SUBUTEX) 8 mg Place 8 mg under the tongue daily (Patient not taking: Reported on 6/25/2025)      QUEtiapine (SEROquel) 50 mg tablet Take 1 tablet (50 mg total) by mouth daily at bedtime (Patient not taking: Reported on 6/25/2025) 90 tablet 0    valACYclovir (VALTREX) 1,000 mg tablet Take 1 tablet (1,000 mg total) by mouth 3 (three) times a day for 7 days 21 tablet 0     No current facility-administered medications on  file prior to visit.   [2]   Social History  Tobacco Use    Smoking status: Former     Types: Cigarettes    Smokeless tobacco: Never    Tobacco comments:     quit 2010   Vaping Use    Vaping status: Never Used   Substance and Sexual Activity    Alcohol use: No    Drug use: No    Sexual activity: Yes     Partners: Male     Birth control/protection: None

## 2025-07-09 DIAGNOSIS — F41.9 ANXIETY: ICD-10-CM

## 2025-07-09 DIAGNOSIS — G47.00 INSOMNIA, UNSPECIFIED TYPE: ICD-10-CM

## 2025-07-09 DIAGNOSIS — F98.8 ATTENTION DEFICIT DISORDER, UNSPECIFIED HYPERACTIVITY PRESENCE: ICD-10-CM

## 2025-07-09 DIAGNOSIS — F32.A DEPRESSION, UNSPECIFIED DEPRESSION TYPE: ICD-10-CM

## 2025-07-11 RX ORDER — ZOLPIDEM TARTRATE 12.5 MG/1
12.5 TABLET, FILM COATED, EXTENDED RELEASE ORAL
Qty: 90 TABLET | Refills: 0 | Status: SHIPPED | OUTPATIENT
Start: 2025-07-11

## 2025-07-11 RX ORDER — CLONAZEPAM 0.5 MG/1
0.5 TABLET ORAL 2 TIMES DAILY PRN
Qty: 30 TABLET | Refills: 0 | Status: SHIPPED | OUTPATIENT
Start: 2025-07-11

## 2025-07-11 RX ORDER — DEXTROAMPHETAMINE SACCHARATE, AMPHETAMINE ASPARTATE, DEXTROAMPHETAMINE SULFATE AND AMPHETAMINE SULFATE 5; 5; 5; 5 MG/1; MG/1; MG/1; MG/1
20 TABLET ORAL
Qty: 60 TABLET | Refills: 0 | Status: SHIPPED | OUTPATIENT
Start: 2025-07-11

## 2025-08-08 DIAGNOSIS — G47.00 INSOMNIA, UNSPECIFIED TYPE: ICD-10-CM

## 2025-08-08 DIAGNOSIS — F98.8 ATTENTION DEFICIT DISORDER, UNSPECIFIED HYPERACTIVITY PRESENCE: ICD-10-CM

## 2025-08-08 DIAGNOSIS — F41.9 ANXIETY: ICD-10-CM

## 2025-08-08 DIAGNOSIS — F32.A DEPRESSION, UNSPECIFIED DEPRESSION TYPE: ICD-10-CM

## 2025-08-08 RX ORDER — DEXTROAMPHETAMINE SACCHARATE, AMPHETAMINE ASPARTATE, DEXTROAMPHETAMINE SULFATE AND AMPHETAMINE SULFATE 5; 5; 5; 5 MG/1; MG/1; MG/1; MG/1
20 TABLET ORAL
Qty: 60 TABLET | Refills: 0 | Status: SHIPPED | OUTPATIENT
Start: 2025-08-08

## 2025-08-08 RX ORDER — CLONAZEPAM 0.5 MG/1
0.5 TABLET ORAL 2 TIMES DAILY PRN
Qty: 30 TABLET | Refills: 0 | Status: SHIPPED | OUTPATIENT
Start: 2025-08-08

## 2025-08-18 DIAGNOSIS — I15.9 SECONDARY HYPERTENSION: ICD-10-CM

## 2025-08-18 DIAGNOSIS — B02.9 HERPES ZOSTER WITHOUT COMPLICATION: ICD-10-CM

## 2025-08-20 RX ORDER — VALACYCLOVIR HYDROCHLORIDE 1 G/1
1000 TABLET, FILM COATED ORAL 3 TIMES DAILY
Qty: 21 TABLET | Refills: 0 | Status: SHIPPED | OUTPATIENT
Start: 2025-08-20 | End: 2025-08-27

## 2025-08-20 RX ORDER — NIFEDIPINE 60 MG/1
60 TABLET, EXTENDED RELEASE ORAL DAILY
Qty: 90 TABLET | Refills: 1 | Status: SHIPPED | OUTPATIENT
Start: 2025-08-20